# Patient Record
Sex: FEMALE | Race: ASIAN | NOT HISPANIC OR LATINO | URBAN - METROPOLITAN AREA
[De-identification: names, ages, dates, MRNs, and addresses within clinical notes are randomized per-mention and may not be internally consistent; named-entity substitution may affect disease eponyms.]

---

## 2023-05-25 LAB
ABO GROUP (TYPE) IN BLOOD: NORMAL
ALANINE AMINOTRANSFERASE (SGPT) (U/L) IN SER/PLAS: 11 U/L (ref 7–45)
ALBUMIN (G/DL) IN SER/PLAS: 4.4 G/DL (ref 3.4–5)
ALKALINE PHOSPHATASE (U/L) IN SER/PLAS: 65 U/L (ref 33–110)
ANION GAP IN SER/PLAS: 14 MMOL/L (ref 10–20)
ANTIBODY SCREEN: NORMAL
ASPARTATE AMINOTRANSFERASE (SGOT) (U/L) IN SER/PLAS: 12 U/L (ref 9–39)
BILIRUBIN TOTAL (MG/DL) IN SER/PLAS: 0.6 MG/DL (ref 0–1.2)
CALCIUM (MG/DL) IN SER/PLAS: 10.2 MG/DL (ref 8.6–10.6)
CARBON DIOXIDE, TOTAL (MMOL/L) IN SER/PLAS: 26 MMOL/L (ref 21–32)
CHLORIDE (MMOL/L) IN SER/PLAS: 101 MMOL/L (ref 98–107)
CREATININE (MG/DL) IN SER/PLAS: 0.48 MG/DL (ref 0.5–1.05)
ERYTHROCYTE DISTRIBUTION WIDTH (RATIO) BY AUTOMATED COUNT: 13.7 % (ref 11.5–14.5)
ERYTHROCYTE MEAN CORPUSCULAR HEMOGLOBIN CONCENTRATION (G/DL) BY AUTOMATED: 32.1 G/DL (ref 32–36)
ERYTHROCYTE MEAN CORPUSCULAR VOLUME (FL) BY AUTOMATED COUNT: 94 FL (ref 80–100)
ERYTHROCYTES (10*6/UL) IN BLOOD BY AUTOMATED COUNT: 4.47 X10E12/L (ref 4–5.2)
ESTIMATED AVERAGE GLUCOSE FOR HBA1C: 100 MG/DL
GFR FEMALE: >90 ML/MIN/1.73M2
GLUCOSE (MG/DL) IN SER/PLAS: 95 MG/DL (ref 74–99)
HEMATOCRIT (%) IN BLOOD BY AUTOMATED COUNT: 42.1 % (ref 36–46)
HEMOGLOBIN (G/DL) IN BLOOD: 13.5 G/DL (ref 12–16)
HEMOGLOBIN A1C/HEMOGLOBIN TOTAL IN BLOOD: 5.1 %
HEPATITIS B VIRUS SURFACE AG PRESENCE IN SERUM: NONREACTIVE
HEPATITIS C VIRUS AB PRESENCE IN SERUM: NONREACTIVE
HIV 1/ 2 AG/AB SCREEN: NONREACTIVE
LEUKOCYTES (10*3/UL) IN BLOOD BY AUTOMATED COUNT: 8.2 X10E9/L (ref 4.4–11.3)
NRBC (PER 100 WBCS) BY AUTOMATED COUNT: 0 /100 WBC (ref 0–0)
PLATELETS (10*3/UL) IN BLOOD AUTOMATED COUNT: 357 X10E9/L (ref 150–450)
POTASSIUM (MMOL/L) IN SER/PLAS: 4 MMOL/L (ref 3.5–5.3)
PROTEIN TOTAL: 8 G/DL (ref 6.4–8.2)
REFLEX ADDED, ANEMIA PANEL: NORMAL
RH FACTOR: NORMAL
RUBELLA VIRUS IGG AB: NEGATIVE
SODIUM (MMOL/L) IN SER/PLAS: 137 MMOL/L (ref 136–145)
SYPHILIS TOTAL AB: NONREACTIVE
UREA NITROGEN (MG/DL) IN SER/PLAS: 11 MG/DL (ref 6–23)

## 2023-05-26 LAB
CHLAMYDIA TRACH., AMPLIFIED: NEGATIVE
CREATININE (MG/DL) IN URINE: 55.1 MG/DL (ref 20–320)
N. GONORRHEA, AMPLIFIED: NEGATIVE
PROTEIN (MG/DL) IN URINE: 6 MG/DL (ref 5–24)
PROTEIN/CREATININE (MG/MG) IN URINE: 0.11 MG/MG CREAT (ref 0–0.17)

## 2023-05-28 LAB — URINE CULTURE: ABNORMAL

## 2023-05-30 LAB
HEMOGLOBIN A2: 3 %
HEMOGLOBIN A: 96.4 %
HEMOGLOBIN F: 0.6 %
HEMOGLOBIN IDENTIFICATION INTERPRETATION: NORMAL
PATH REVIEW-HGB IDENTIFICATION: NORMAL

## 2023-06-26 LAB — GLUCOSE, 1 HR SCREEN, PREG: 199 MG/DL

## 2023-06-28 LAB — URINE CULTURE: ABNORMAL

## 2023-08-26 LAB
CREATININE (MG/DL) IN URINE: 147 MG/DL (ref 20–320)
PROTEIN (MG/DL) IN URINE: 12 MG/DL (ref 5–24)
PROTEIN/CREATININE (MG/MG) IN URINE: 0.08 MG/MG CREAT (ref 0–0.17)

## 2023-08-29 LAB
ALANINE AMINOTRANSFERASE (SGPT) (U/L) IN SER/PLAS: 8 U/L (ref 7–45)
ALBUMIN (G/DL) IN SER/PLAS: 3.9 G/DL (ref 3.4–5)
ALKALINE PHOSPHATASE (U/L) IN SER/PLAS: 53 U/L (ref 33–110)
ANION GAP IN SER/PLAS: 13 MMOL/L (ref 10–20)
ASPARTATE AMINOTRANSFERASE (SGOT) (U/L) IN SER/PLAS: 9 U/L (ref 9–39)
BILIRUBIN TOTAL (MG/DL) IN SER/PLAS: 0.4 MG/DL (ref 0–1.2)
CALCIUM (MG/DL) IN SER/PLAS: 9.6 MG/DL (ref 8.6–10.6)
CARBON DIOXIDE, TOTAL (MMOL/L) IN SER/PLAS: 26 MMOL/L (ref 21–32)
CHLORIDE (MMOL/L) IN SER/PLAS: 103 MMOL/L (ref 98–107)
CREATININE (MG/DL) IN SER/PLAS: 0.61 MG/DL (ref 0.5–1.05)
ERYTHROCYTE DISTRIBUTION WIDTH (RATIO) BY AUTOMATED COUNT: 13.4 % (ref 11.5–14.5)
ERYTHROCYTE MEAN CORPUSCULAR HEMOGLOBIN CONCENTRATION (G/DL) BY AUTOMATED: 33.4 G/DL (ref 32–36)
ERYTHROCYTE MEAN CORPUSCULAR VOLUME (FL) BY AUTOMATED COUNT: 93 FL (ref 80–100)
ERYTHROCYTES (10*6/UL) IN BLOOD BY AUTOMATED COUNT: 3.95 X10E12/L (ref 4–5.2)
GFR FEMALE: >90 ML/MIN/1.73M2
GLUCOSE (MG/DL) IN SER/PLAS: 88 MG/DL (ref 74–99)
HEMATOCRIT (%) IN BLOOD BY AUTOMATED COUNT: 36.8 % (ref 36–46)
HEMOGLOBIN (G/DL) IN BLOOD: 12.3 G/DL (ref 12–16)
LACTATE DEHYDROGENASE (U/L) IN SER/PLAS BY LAC->PYR RXN: 138 U/L (ref 84–246)
LEUKOCYTES (10*3/UL) IN BLOOD BY AUTOMATED COUNT: 8.8 X10E9/L (ref 4.4–11.3)
NRBC (PER 100 WBCS) BY AUTOMATED COUNT: 0 /100 WBC (ref 0–0)
PLATELETS (10*3/UL) IN BLOOD AUTOMATED COUNT: 333 X10E9/L (ref 150–450)
POTASSIUM (MMOL/L) IN SER/PLAS: 4 MMOL/L (ref 3.5–5.3)
PROTEIN TOTAL: 6.9 G/DL (ref 6.4–8.2)
SODIUM (MMOL/L) IN SER/PLAS: 138 MMOL/L (ref 136–145)
UREA NITROGEN (MG/DL) IN SER/PLAS: 8 MG/DL (ref 6–23)

## 2023-09-25 PROBLEM — R05.3 CHRONIC COUGH: Status: ACTIVE | Noted: 2023-09-25

## 2023-09-25 PROBLEM — O24.414 INSULIN CONTROLLED GESTATIONAL DIABETES MELLITUS (GDM) DURING PREGNANCY, ANTEPARTUM (HHS-HCC): Status: ACTIVE | Noted: 2023-09-25

## 2023-09-25 PROBLEM — L30.9 ECZEMA: Status: ACTIVE | Noted: 2023-09-25

## 2023-09-25 PROBLEM — L21.8 OTHER SEBORRHEIC DERMATITIS: Status: ACTIVE | Noted: 2019-11-26

## 2023-09-25 PROBLEM — O10.913 MATERNAL CHRONIC HYPERTENSION IN THIRD TRIMESTER (HHS-HCC): Status: ACTIVE | Noted: 2023-09-25

## 2023-09-25 PROBLEM — O09.529: Status: ACTIVE | Noted: 2023-09-25

## 2023-09-25 PROBLEM — N39.0 URINARY TRACT INFECTION: Status: ACTIVE | Noted: 2023-09-25

## 2023-09-25 PROBLEM — O09.299: Status: ACTIVE | Noted: 2023-09-25

## 2023-09-25 PROBLEM — G47.00 INSOMNIA: Status: ACTIVE | Noted: 2023-09-25

## 2023-09-25 PROBLEM — Z98.891 HISTORY OF CESAREAN SECTION, CLASSICAL: Status: ACTIVE | Noted: 2023-09-25

## 2023-09-25 PROBLEM — N39.0 ACUTE UTI: Status: ACTIVE | Noted: 2023-09-25

## 2023-09-25 PROBLEM — O10.919 CHRONIC HYPERTENSION IN PREGNANCY (HHS-HCC): Status: ACTIVE | Noted: 2023-09-25

## 2023-09-25 PROBLEM — O14.10 SEVERE PREECLAMPSIA (HHS-HCC): Status: ACTIVE | Noted: 2023-09-25

## 2023-09-25 PROBLEM — R03.0 ELEVATED BLOOD PRESSURE READING: Status: ACTIVE | Noted: 2023-09-25

## 2023-09-25 PROBLEM — I10 HYPERTENSION: Status: ACTIVE | Noted: 2023-09-25

## 2023-09-25 PROBLEM — O23.40 UTI IN PREGNANCY (HHS-HCC): Status: ACTIVE | Noted: 2023-09-25

## 2023-09-25 PROBLEM — O24.419 GESTATIONAL DIABETES (HHS-HCC): Status: ACTIVE | Noted: 2023-09-25

## 2023-09-25 RX ORDER — IBUPROFEN 600 MG/1
TABLET ORAL EVERY 6 HOURS PRN
Status: ON HOLD | COMMUNITY
Start: 2021-07-12 | End: 2023-10-20 | Stop reason: ALTCHOICE

## 2023-09-25 RX ORDER — BETAMETHASONE DIPROPIONATE 0.5 MG/G
LOTION TOPICAL
COMMUNITY
Start: 2019-11-26

## 2023-09-25 RX ORDER — ACETAMINOPHEN 500 MG
TABLET ORAL EVERY 6 HOURS PRN
Status: ON HOLD | COMMUNITY
Start: 2021-07-12 | End: 2023-11-07 | Stop reason: SDUPTHER

## 2023-09-25 RX ORDER — NIFEDIPINE 30 MG/1
1 TABLET, FILM COATED, EXTENDED RELEASE ORAL DAILY
COMMUNITY
End: 2023-10-20 | Stop reason: DRUGHIGH

## 2023-09-25 RX ORDER — CICLOPIROX 1 G/100ML
SHAMPOO TOPICAL
Status: ON HOLD | COMMUNITY
Start: 2019-11-26 | End: 2023-10-20 | Stop reason: ALTCHOICE

## 2023-09-25 RX ORDER — DOCUSATE SODIUM 100 MG/1
CAPSULE, LIQUID FILLED ORAL 2 TIMES DAILY PRN
Status: ON HOLD | COMMUNITY
Start: 2021-07-12 | End: 2023-10-20 | Stop reason: ALTCHOICE

## 2023-10-02 ENCOUNTER — TELEPHONE (OUTPATIENT)
Dept: MATERNAL FETAL MEDICINE | Facility: CLINIC | Age: 38
End: 2023-10-02
Payer: OTHER GOVERNMENT

## 2023-10-02 NOTE — TELEPHONE ENCOUNTER
Communicated with the patient on 10/2/2023  She has Type 2 Diabetes  @ 26w4d    At the time of the call her diabetes was treated with:  Basaglar 18/28 am/pm  Lispro 10/10/24 with meals     Patient uses Dexcom G7 CGM for glucose monitoring. A digital download was completed and reviewed with the team.    Average glucose: 114 mg/dL  Standard deviation: 25 mg/dL    Time in Range  Very High: 0%  High: 16%  In Range: 83%  Low: <1%  Very Low: <1%    Target Range   mg/dL    Impression - Had some discrepancy with BGM and CGM readings being >20 points off. Did re calibrate, and just placed new sensor this morning. Having lunch and dinner elevations. When she eats lunch at work, she has better glucose readings, only taking 10 units. At home she has been taking 14 units with lunch, and is still elevated.     The patient's regimen was changed, as discussed with Dr. Ham, to:  Basaglar 18/28 am/pm  Lispro 10/10 or 18*/28* with meals --> will take larger dose when at home for lunch    Patient understands to submit sugar log for review weekly through the Blood Sugar Line @ [phone] or via email to [email or web address] to help optimize glucose control.     I Approximately 5-7 minutes was spent discussing the above information.

## 2023-10-05 DIAGNOSIS — O10.919 CHRONIC HYPERTENSION IN PREGNANCY (HHS-HCC): ICD-10-CM

## 2023-10-05 PROBLEM — D68.61 ANTIPHOSPHOLIPID ANTIBODY SYNDROME COMPLICATING PREGNANCY (MULTI): Status: ACTIVE | Noted: 2023-10-05

## 2023-10-05 PROBLEM — N39.0 URINARY TRACT INFECTION: Status: RESOLVED | Noted: 2023-09-25 | Resolved: 2023-10-05

## 2023-10-05 PROBLEM — Z87.59 HISTORY OF IUFD: Status: ACTIVE | Noted: 2023-10-05

## 2023-10-05 PROBLEM — O23.42: Status: ACTIVE | Noted: 2023-10-05

## 2023-10-05 PROBLEM — O99.119 ANTIPHOSPHOLIPID ANTIBODY SYNDROME COMPLICATING PREGNANCY (MULTI): Status: ACTIVE | Noted: 2023-10-05

## 2023-10-05 PROBLEM — Z87.898 HISTORY OF POOR FETAL GROWTH: Status: ACTIVE | Noted: 2023-10-05

## 2023-10-05 PROBLEM — R05.3 CHRONIC COUGH: Status: RESOLVED | Noted: 2023-09-25 | Resolved: 2023-10-05

## 2023-10-05 PROBLEM — N39.0 ACUTE UTI: Status: RESOLVED | Noted: 2023-09-25 | Resolved: 2023-10-05

## 2023-10-05 PROBLEM — O10.913 MATERNAL CHRONIC HYPERTENSION IN THIRD TRIMESTER (HHS-HCC): Status: RESOLVED | Noted: 2023-09-25 | Resolved: 2023-10-05

## 2023-10-05 PROBLEM — O09.522 MULTIGRAVIDA OF ADVANCED MATERNAL AGE IN SECOND TRIMESTER (HHS-HCC): Status: ACTIVE | Noted: 2023-10-05

## 2023-10-05 PROBLEM — O23.40 UTI IN PREGNANCY (HHS-HCC): Status: RESOLVED | Noted: 2023-09-25 | Resolved: 2023-10-05

## 2023-10-05 PROBLEM — Z87.59 HISTORY OF IUFD: Status: ACTIVE | Noted: 2023-09-25

## 2023-10-05 NOTE — ASSESSMENT & PLAN NOTE
- H/o IUFD in first pregnancy while undergoing surveillance at the hospital.   -plan for serial growth US starting at 24 wga.   -Recommend early  testing.

## 2023-10-05 NOTE — ASSESSMENT & PLAN NOTE
- Continue lovenox ppx 40 mg SC daily through 6 weeks postpartum, nw rx sent today   - Continue  mg now.   - Plan for  testing at 32 wks, given prior IUFD likely plan for sooner (see IUFD dx)

## 2023-10-05 NOTE — ASSESSMENT & PLAN NOTE
- A1c 5.1%   - Baseline [facility] labs, P:C WNL   - Current regimen, Basaglar  and  Lispro 10/10/24, adjusts additional 2-4 units for higher carb meal. Basaglar bedtime dose was discussed with prior visit to increase to 32. Confirmed pt current insulin dosing and no changes made today.   - PT has had some reactions to the prior insulins. Reports Basaglar has been working well. No reactions.   -Serial growth, twice weekly  testing at 32 wks

## 2023-10-05 NOTE — ASSESSMENT & PLAN NOTE
-PNL WNL  -Serial growth planned   -Discussed tdap and flu vaccines - wants to get at next visit   -Covid booster discussed- plans to get  -Third trimester labs ordered   -Continue weekly communication with blood sugar line   -PNV x 2 wks

## 2023-10-05 NOTE — ASSESSMENT & PLAN NOTE
-baseline HELLP labs WNL with P:C 0.11,  repeat set WNL and P:C 0.08 ()   - Will obtain maternal echo given longstanding h/o cHTN   - Continue bASA 162mg QD   - BPs 130/80-90 at home, continue home BP monitoring   - Continue Nifedipine 90mg daily   -Denies s/s PEC  -Twice weekly  testing, serial growth

## 2023-10-05 NOTE — ASSESSMENT & PLAN NOTE
-H/O delivery at early viability (24 wks)   -Plan for rCD 36-37 wks or earlier pending pregnancy progression

## 2023-10-06 ENCOUNTER — ANCILLARY PROCEDURE (OUTPATIENT)
Dept: RADIOLOGY | Facility: CLINIC | Age: 38
End: 2023-10-06
Payer: OTHER GOVERNMENT

## 2023-10-06 ENCOUNTER — ROUTINE PRENATAL (OUTPATIENT)
Dept: MATERNAL FETAL MEDICINE | Facility: CLINIC | Age: 38
End: 2023-10-06
Payer: OTHER GOVERNMENT

## 2023-10-06 ENCOUNTER — APPOINTMENT (OUTPATIENT)
Dept: RADIOLOGY | Facility: CLINIC | Age: 38
End: 2023-10-06
Payer: OTHER GOVERNMENT

## 2023-10-06 VITALS — SYSTOLIC BLOOD PRESSURE: 126 MMHG | BODY MASS INDEX: 34.57 KG/M2 | WEIGHT: 189 LBS | DIASTOLIC BLOOD PRESSURE: 83 MMHG

## 2023-10-06 DIAGNOSIS — Z87.898 HISTORY OF POOR FETAL GROWTH: ICD-10-CM

## 2023-10-06 DIAGNOSIS — O10.919 CHRONIC HYPERTENSION IN PREGNANCY (HHS-HCC): ICD-10-CM

## 2023-10-06 DIAGNOSIS — O99.119 ANTIPHOSPHOLIPID ANTIBODY SYNDROME COMPLICATING PREGNANCY (MULTI): ICD-10-CM

## 2023-10-06 DIAGNOSIS — O24.414 INSULIN CONTROLLED GESTATIONAL DIABETES MELLITUS (GDM) DURING PREGNANCY, ANTEPARTUM (HHS-HCC): ICD-10-CM

## 2023-10-06 DIAGNOSIS — Z34.90 PREGNANT (HHS-HCC): ICD-10-CM

## 2023-10-06 DIAGNOSIS — O23.42 RECURRENT URINARY TRACT INFECTION AFFECTING PREGNANCY IN SECOND TRIMESTER (HHS-HCC): ICD-10-CM

## 2023-10-06 DIAGNOSIS — O09.522 MULTIGRAVIDA OF ADVANCED MATERNAL AGE IN SECOND TRIMESTER (HHS-HCC): ICD-10-CM

## 2023-10-06 DIAGNOSIS — Z98.891 HISTORY OF CESAREAN SECTION, CLASSICAL: ICD-10-CM

## 2023-10-06 DIAGNOSIS — O09.522 HIGH RISK PREGNANCY, MULTIGRAVIDA OF ADVANCED MATERNAL AGE IN SECOND TRIMESTER (HHS-HCC): ICD-10-CM

## 2023-10-06 DIAGNOSIS — D68.61 ANTIPHOSPHOLIPID ANTIBODY SYNDROME COMPLICATING PREGNANCY (MULTI): ICD-10-CM

## 2023-10-06 DIAGNOSIS — Z87.59 HISTORY OF IUFD: ICD-10-CM

## 2023-10-06 LAB
POC APPEARANCE, URINE: CLEAR
POC BILIRUBIN, URINE: NEGATIVE
POC BLOOD, URINE: NEGATIVE
POC COLOR, URINE: YELLOW
POC GLUCOSE, URINE: NEGATIVE MG/DL
POC KETONES, URINE: ABNORMAL MG/DL
POC LEUKOCYTES, URINE: NEGATIVE
POC NITRITE,URINE: NEGATIVE
POC PH, URINE: 6 PH
POC PROTEIN, URINE: ABNORMAL MG/DL
POC SPECIFIC GRAVITY, URINE: 1.02
POC UROBILINOGEN, URINE: 0.2 EU/DL

## 2023-10-06 PROCEDURE — 81003 URINALYSIS AUTO W/O SCOPE: CPT | Mod: QW | Performed by: NURSE PRACTITIONER

## 2023-10-06 PROCEDURE — 76816 OB US FOLLOW-UP PER FETUS: CPT | Performed by: OBSTETRICS & GYNECOLOGY

## 2023-10-06 PROCEDURE — 76819 FETAL BIOPHYS PROFIL W/O NST: CPT | Performed by: OBSTETRICS & GYNECOLOGY

## 2023-10-06 PROCEDURE — G0463 HOSPITAL OUTPT CLINIC VISIT: HCPCS | Mod: 25 | Performed by: NURSE PRACTITIONER

## 2023-10-06 PROCEDURE — 76816 OB US FOLLOW-UP PER FETUS: CPT

## 2023-10-06 PROCEDURE — 99215 OFFICE O/P EST HI 40 MIN: CPT | Performed by: NURSE PRACTITIONER

## 2023-10-06 PROCEDURE — 99215 OFFICE O/P EST HI 40 MIN: CPT | Mod: 25 | Performed by: NURSE PRACTITIONER

## 2023-10-06 RX ORDER — ENOXAPARIN SODIUM 100 MG/ML
40 INJECTION SUBCUTANEOUS DAILY
Qty: 30 EACH | Refills: 5 | Status: ON HOLD | OUTPATIENT
Start: 2023-10-06 | End: 2023-11-07

## 2023-10-06 ASSESSMENT — EDINBURGH POSTNATAL DEPRESSION SCALE (EPDS)
I HAVE BEEN ANXIOUS OR WORRIED FOR NO GOOD REASON: YES, SOMETIMES
I HAVE BEEN SO UNHAPPY THAT I HAVE BEEN CRYING: NO, NEVER
THE THOUGHT OF HARMING MYSELF HAS OCCURRED TO ME: NEVER
I HAVE BEEN ABLE TO LAUGH AND SEE THE FUNNY SIDE OF THINGS: AS MUCH AS I ALWAYS COULD
I HAVE FELT SAD OR MISERABLE: NO, NOT AT ALL
I HAVE BEEN SO UNHAPPY THAT I HAVE HAD DIFFICULTY SLEEPING: NOT AT ALL
THINGS HAVE BEEN GETTING ON TOP OF ME: NO, MOST OF THE TIME I HAVE COPED QUITE WELL
TOTAL SCORE: 6
I HAVE FELT SCARED OR PANICKY FOR NO GOOD REASON: NO, NOT MUCH
I HAVE LOOKED FORWARD WITH ENJOYMENT TO THINGS: AS MUCH AS I EVER DID
I HAVE BLAMED MYSELF UNNECESSARILY WHEN THINGS WENT WRONG: YES, SOME OF THE TIME

## 2023-10-06 NOTE — PROGRESS NOTES
Subjective   Patient ID [id number]   Gabrielle David is a 38 y.o.  at 27w1d with a working estimated date of delivery of 2024, by Last Menstrual Period who presents for a routine prenatal visit. She denies vaginal bleeding, leakage of fluid, or contractions. +FM.     Overall doing well.     BP at home has been 130s/80s. Denies s/s PEC. Compliant with nifedipine 90mg every day.     Her pregnancy is complicated by:  cHTN  APLS  Poor pregnancy hx  GDM    Objective   Physical Exam  Weight: 85.7 kg (189 lb)  BP: 126/83    Gen-NAD  Cardiac- good peripheral perfusion   Respiratory- non-labored breathing   Abdomen-soft, non-tender, gravid  Extremities- symmetrical          Prenatal Labs    Lab Results   Component Value Date    HGB 12.3 2023    HCT 36.8 2023    HEPBSAG NONREACTIVE 2023         Imaging  The most recent ultrasound was performed today, 10/6- normal interval growth, EFW 17%, AC 20%, BPP     Assessment/Plan   Problem List Items Addressed This Visit             ICD-10-CM       Pregnancy    History of  section, classical Z98.891     -H/O delivery at early viability (24 wks)   -Plan for rCD 36-37 wks or earlier pending pregnancy progression            High-risk pregnancy, elderly multigravida O09.529     -PNL WNL  -Serial growth planned   -Discussed tdap and flu vaccines - wants to get at next visit   -Covid booster discussed- plans to get  -Third trimester labs ordered   -Continue weekly communication with blood sugar line   -PNV x 2 wks          Relevant Orders    POCT UA Automated manually resulted    CBC Anemia Panel With Reflex, Pregnancy    Syphilis Screen with Reflex    Vitamin D 25-Hydroxy,Total (for eval of Vitamin D levels)    Insulin controlled gestational diabetes mellitus (GDM) during pregnancy, antepartum O24.414     - A1c 5.1%   - Baseline HELLP labs, P:C WNL   - Current regimen, Basaglar  and  Lispro 10/10/24, adjusts additional 2-4 units for higher carb meal.  Basaglar bedtime dose was discussed with prior visit to increase to 32. Confirmed pt current insulin dosing and no changes made today.   - PT has had some reactions to the prior insulins. Reports Basaglar has been working well. No reactions.   -Serial growth, twice weekly  testing at 32 wks          Chronic hypertension in pregnancy O10.919     -baseline HELLP labs WNL with P:C 0.11,  repeat set WNL and P:C 0.08 ()   - Will obtain maternal echo given longstanding h/o cHTN   - Continue bASA 162mg QD   - BPs 130/80-90 at home, continue home BP monitoring   - Continue Nifedipine 90mg daily   -Denies s/s PEC  -Twice weekly  testing, serial growth          History of IUFD Z87.59     - H/o IUFD in first pregnancy while undergoing surveillance at the hospital.   -plan for serial growth US starting at 24 wga.   -Recommend early  testing.          Antiphospholipid antibody syndrome complicating pregnancy (CMS/[facility]) O99.119, D68.61     - Continue lovenox ppx 40 mg SC daily through 6 weeks postpartum, nw rx sent today   - Continue  mg now.   - Plan for  testing at 32 wks, given prior IUFD likely plan for sooner (see IUFD dx)         Relevant Medications    enoxaparin (Lovenox) 40 mg/0.4 mL syringe    Recurrent urinary tract infection affecting pregnancy in second trimester O23.42     -Continue ppx macrobid          History of poor fetal growth Z87.898     -Serial growth planned  -Growth today, 10/6- EFW 17%, AC 20%- normal interval growth          Multigravida of advanced maternal age in second trimester O09.522     -Declined genetic counseling and cfDNA testing   -NT WNL  -Anatomy completed  and WNL              Continue prenatal vitamin.  Labs ordered  Tdap and flu next visit   Follow up in 2 weeks    PRINCE Hull-CNP

## 2023-10-10 ENCOUNTER — LAB (OUTPATIENT)
Dept: LAB | Facility: LAB | Age: 38
End: 2023-10-10
Payer: OTHER GOVERNMENT

## 2023-10-10 DIAGNOSIS — O09.522 HIGH RISK PREGNANCY, MULTIGRAVIDA OF ADVANCED MATERNAL AGE IN SECOND TRIMESTER (HHS-HCC): ICD-10-CM

## 2023-10-10 DIAGNOSIS — E55.9 VITAMIN D DEFICIENCY: Primary | ICD-10-CM

## 2023-10-10 PROCEDURE — 85027 COMPLETE CBC AUTOMATED: CPT

## 2023-10-10 PROCEDURE — 36415 COLL VENOUS BLD VENIPUNCTURE: CPT

## 2023-10-10 PROCEDURE — 86780 TREPONEMA PALLIDUM: CPT

## 2023-10-10 PROCEDURE — 82306 VITAMIN D 25 HYDROXY: CPT

## 2023-10-11 ENCOUNTER — TELEPHONE (OUTPATIENT)
Dept: OBSTETRICS AND GYNECOLOGY | Facility: CLINIC | Age: 38
End: 2023-10-11
Payer: OTHER GOVERNMENT

## 2023-10-11 LAB
25(OH)D3 SERPL-MCNC: 17 NG/ML (ref 30–100)
ERYTHROCYTE [DISTWIDTH] IN BLOOD BY AUTOMATED COUNT: 13.8 % (ref 11.5–14.5)
HCT VFR BLD AUTO: 40.5 % (ref 36–46)
HGB BLD-MCNC: 13.2 G/DL (ref 12–16)
MCH RBC QN AUTO: 30.9 PG (ref 26–34)
MCHC RBC AUTO-ENTMCNC: 32.6 G/DL (ref 32–36)
MCV RBC AUTO: 95 FL (ref 80–100)
NRBC BLD-RTO: 0 /100 WBCS (ref 0–0)
PLATELET # BLD AUTO: 325 X10*3/UL (ref 150–450)
PMV BLD AUTO: 10.5 FL (ref 7.5–11.5)
RBC # BLD AUTO: 4.27 X10*6/UL (ref 4–5.2)
T PALLIDUM AB SER QL: NONREACTIVE
WBC # BLD AUTO: 8.4 X10*3/UL (ref 4.4–11.3)

## 2023-10-11 RX ORDER — VIT C/E/ZN/COPPR/LUTEIN/ZEAXAN 250MG-90MG
25 CAPSULE ORAL DAILY
Qty: 30 CAPSULE | Refills: 4 | Status: SHIPPED | OUTPATIENT
Start: 2023-10-11 | End: 2023-11-07 | Stop reason: HOSPADM

## 2023-10-11 NOTE — TELEPHONE ENCOUNTER
Spoke to the patient and let her know her vitamin D level was low and to start taking a vitamin D supplement. The patient said she will also try adding more food with vitamin D into her diet.

## 2023-10-13 ENCOUNTER — APPOINTMENT (OUTPATIENT)
Dept: PEDIATRIC CARDIOLOGY | Facility: CLINIC | Age: 38
End: 2023-10-13
Payer: OTHER GOVERNMENT

## 2023-10-13 ENCOUNTER — TELEPHONE (OUTPATIENT)
Dept: MATERNAL FETAL MEDICINE | Facility: HOSPITAL | Age: 38
End: 2023-10-13
Payer: OTHER GOVERNMENT

## 2023-10-13 NOTE — TELEPHONE ENCOUNTER
Blood Sugar Support Line Communication   Communicated with the patient on 10/13/2023   She has Gestational Diabetes @ 28w1d    The patient checks her sugars fasting and 1 hour after meals. Her current regimen is as follows:  Actually taking  Basaglar Insulin (glargine) 18/28  Lispro Insulin 10/18/24    The patient's reported blood sugars appear well controlled, with 80% within the goal range. No changes to her current treatment plan are indicated at this time.    Patient understands to submit sugar log for review weekly through the Blood Sugar Line @ [phone] or via email to [email or web address] to help optimize glucose control.    I spent approximately 4-6 minutes on the phone with the patient

## 2023-10-17 ENCOUNTER — ANCILLARY PROCEDURE (OUTPATIENT)
Dept: CARDIOLOGY | Facility: CLINIC | Age: 38
End: 2023-10-17
Payer: OTHER GOVERNMENT

## 2023-10-17 VITALS — DIASTOLIC BLOOD PRESSURE: 83 MMHG | SYSTOLIC BLOOD PRESSURE: 126 MMHG

## 2023-10-17 DIAGNOSIS — O10.919 CHRONIC HYPERTENSION IN PREGNANCY (HHS-HCC): ICD-10-CM

## 2023-10-17 LAB — EJECTION FRACTION APICAL 4 CHAMBER: 69.7

## 2023-10-17 PROCEDURE — 93306 TTE W/DOPPLER COMPLETE: CPT | Performed by: STUDENT IN AN ORGANIZED HEALTH CARE EDUCATION/TRAINING PROGRAM

## 2023-10-17 PROCEDURE — 93306 TTE W/DOPPLER COMPLETE: CPT

## 2023-10-20 ENCOUNTER — LAB (OUTPATIENT)
Dept: LAB | Facility: LAB | Age: 38
End: 2023-10-20
Payer: OTHER GOVERNMENT

## 2023-10-20 ENCOUNTER — APPOINTMENT (OUTPATIENT)
Dept: OBSTETRICS AND GYNECOLOGY | Facility: CLINIC | Age: 38
End: 2023-10-20
Payer: OTHER GOVERNMENT

## 2023-10-20 ENCOUNTER — HOSPITAL ENCOUNTER (OUTPATIENT)
Facility: HOSPITAL | Age: 38
Discharge: HOME | End: 2023-10-20
Attending: OBSTETRICS & GYNECOLOGY | Admitting: OBSTETRICS & GYNECOLOGY
Payer: OTHER GOVERNMENT

## 2023-10-20 ENCOUNTER — HOSPITAL ENCOUNTER (INPATIENT)
Facility: HOSPITAL | Age: 38
LOS: 18 days | Discharge: HOME | End: 2023-11-07
Attending: OBSTETRICS & GYNECOLOGY | Admitting: OBSTETRICS & GYNECOLOGY
Payer: OTHER GOVERNMENT

## 2023-10-20 ENCOUNTER — ROUTINE PRENATAL (OUTPATIENT)
Dept: MATERNAL FETAL MEDICINE | Facility: CLINIC | Age: 38
End: 2023-10-20
Payer: OTHER GOVERNMENT

## 2023-10-20 VITALS — DIASTOLIC BLOOD PRESSURE: 95 MMHG | WEIGHT: 197 LBS | BODY MASS INDEX: 36.03 KG/M2 | SYSTOLIC BLOOD PRESSURE: 145 MMHG

## 2023-10-20 VITALS
HEART RATE: 82 BPM | RESPIRATION RATE: 18 BRPM | SYSTOLIC BLOOD PRESSURE: 183 MMHG | DIASTOLIC BLOOD PRESSURE: 97 MMHG | OXYGEN SATURATION: 98 % | TEMPERATURE: 98.1 F

## 2023-10-20 DIAGNOSIS — Z3A.29 29 WEEKS GESTATION OF PREGNANCY (HHS-HCC): ICD-10-CM

## 2023-10-20 DIAGNOSIS — O10.919 CHRONIC HYPERTENSION AFFECTING PREGNANCY (HHS-HCC): ICD-10-CM

## 2023-10-20 DIAGNOSIS — O99.119 ANTIPHOSPHOLIPID ANTIBODY SYNDROME COMPLICATING PREGNANCY (MULTI): ICD-10-CM

## 2023-10-20 DIAGNOSIS — I10 HYPERTENSION: Primary | ICD-10-CM

## 2023-10-20 DIAGNOSIS — D68.61 ANTIPHOSPHOLIPID ANTIBODY SYNDROME COMPLICATING PREGNANCY (MULTI): ICD-10-CM

## 2023-10-20 DIAGNOSIS — Z98.891 STATUS POST CESAREAN SECTION: ICD-10-CM

## 2023-10-20 DIAGNOSIS — O10.919 CHRONIC HYPERTENSION AFFECTING PREGNANCY (HHS-HCC): Primary | ICD-10-CM

## 2023-10-20 PROBLEM — O14.13 SEVERE PREECLAMPSIA, THIRD TRIMESTER (HHS-HCC): Status: ACTIVE | Noted: 2023-10-20

## 2023-10-20 PROBLEM — O11.9 PREECLAMPSIA COMPLICATING HYPERTENSION (HHS-HCC): Status: ACTIVE | Noted: 2023-10-20

## 2023-10-20 LAB
ABO GROUP (TYPE) IN BLOOD: NORMAL
ALBUMIN SERPL BCP-MCNC: 3.4 G/DL (ref 3.4–5)
ALBUMIN SERPL BCP-MCNC: 3.7 G/DL (ref 3.4–5)
ALP SERPL-CCNC: 75 U/L (ref 33–110)
ALP SERPL-CCNC: 77 U/L (ref 33–110)
ALT SERPL W P-5'-P-CCNC: 17 U/L (ref 7–45)
ALT SERPL W P-5'-P-CCNC: 18 U/L (ref 7–45)
ANION GAP SERPL CALC-SCNC: 12 MMOL/L (ref 10–20)
ANION GAP SERPL CALC-SCNC: 13 MMOL/L (ref 10–20)
ANTIBODY SCREEN: NORMAL
AST SERPL W P-5'-P-CCNC: 14 U/L (ref 9–39)
AST SERPL W P-5'-P-CCNC: 15 U/L (ref 9–39)
BILIRUB SERPL-MCNC: 0.3 MG/DL (ref 0–1.2)
BILIRUB SERPL-MCNC: 0.3 MG/DL (ref 0–1.2)
BUN SERPL-MCNC: 11 MG/DL (ref 6–23)
BUN SERPL-MCNC: 12 MG/DL (ref 6–23)
CALCIUM SERPL-MCNC: 8.8 MG/DL (ref 8.6–10.6)
CALCIUM SERPL-MCNC: 9.1 MG/DL (ref 8.6–10.3)
CHLORIDE SERPL-SCNC: 101 MMOL/L (ref 98–107)
CHLORIDE SERPL-SCNC: 104 MMOL/L (ref 98–107)
CO2 SERPL-SCNC: 24 MMOL/L (ref 21–32)
CO2 SERPL-SCNC: 26 MMOL/L (ref 21–32)
CREAT SERPL-MCNC: 0.57 MG/DL (ref 0.5–1.05)
CREAT SERPL-MCNC: 0.61 MG/DL (ref 0.5–1.05)
CREAT UR-MCNC: 167.3 MG/DL (ref 20–320)
ERYTHROCYTE [DISTWIDTH] IN BLOOD BY AUTOMATED COUNT: 14 % (ref 11.5–14.5)
ERYTHROCYTE [DISTWIDTH] IN BLOOD BY AUTOMATED COUNT: 14 % (ref 11.5–14.5)
GFR SERPL CREATININE-BSD FRML MDRD: >90 ML/MIN/1.73M*2
GFR SERPL CREATININE-BSD FRML MDRD: >90 ML/MIN/1.73M*2
GLUCOSE SERPL-MCNC: 170 MG/DL (ref 74–99)
GLUCOSE SERPL-MCNC: 44 MG/DL (ref 74–99)
HCT VFR BLD AUTO: 36.4 % (ref 36–46)
HCT VFR BLD AUTO: 41 % (ref 36–46)
HGB BLD-MCNC: 12.3 G/DL (ref 12–16)
HGB BLD-MCNC: 13.7 G/DL (ref 12–16)
MCH RBC QN AUTO: 31.4 PG (ref 26–34)
MCH RBC QN AUTO: 31.6 PG (ref 26–34)
MCHC RBC AUTO-ENTMCNC: 33.4 G/DL (ref 32–36)
MCHC RBC AUTO-ENTMCNC: 33.8 G/DL (ref 32–36)
MCV RBC AUTO: 93 FL (ref 80–100)
MCV RBC AUTO: 95 FL (ref 80–100)
NRBC BLD-RTO: 0 /100 WBCS (ref 0–0)
NRBC BLD-RTO: 0 /100 WBCS (ref 0–0)
PLATELET # BLD AUTO: 297 X10*3/UL (ref 150–450)
PLATELET # BLD AUTO: 339 X10*3/UL (ref 150–450)
PMV BLD AUTO: 10.2 FL (ref 7.5–11.5)
PMV BLD AUTO: 9.9 FL (ref 7.5–11.5)
POC APPEARANCE, URINE: CLEAR
POC BILIRUBIN, URINE: NEGATIVE
POC BLOOD, URINE: ABNORMAL
POC COLOR, URINE: YELLOW
POC GLUCOSE, URINE: ABNORMAL MG/DL
POC KETONES, URINE: NEGATIVE MG/DL
POC LEUKOCYTES, URINE: NEGATIVE
POC NITRITE,URINE: NEGATIVE
POC PH, URINE: 6 PH
POC PROTEIN, URINE: ABNORMAL MG/DL
POC SPECIFIC GRAVITY, URINE: 1.02
POC UROBILINOGEN, URINE: 0.2 EU/DL
POTASSIUM SERPL-SCNC: 4 MMOL/L (ref 3.5–5.3)
POTASSIUM SERPL-SCNC: 4.5 MMOL/L (ref 3.5–5.3)
PROT SERPL-MCNC: 6.2 G/DL (ref 6.4–8.2)
PROT SERPL-MCNC: 6.5 G/DL (ref 6.4–8.2)
PROT UR-ACNC: 30 MG/DL (ref 5–24)
PROT/CREAT UR: 0.18 MG/MG CREAT (ref 0–0.17)
RBC # BLD AUTO: 3.92 X10*6/UL (ref 4–5.2)
RBC # BLD AUTO: 4.33 X10*6/UL (ref 4–5.2)
RH FACTOR (ANTIGEN D): NORMAL
SODIUM SERPL-SCNC: 134 MMOL/L (ref 136–145)
SODIUM SERPL-SCNC: 137 MMOL/L (ref 136–145)
WBC # BLD AUTO: 8.5 X10*3/UL (ref 4.4–11.3)
WBC # BLD AUTO: 9.5 X10*3/UL (ref 4.4–11.3)

## 2023-10-20 PROCEDURE — 82570 ASSAY OF URINE CREATININE: CPT | Performed by: STUDENT IN AN ORGANIZED HEALTH CARE EDUCATION/TRAINING PROGRAM

## 2023-10-20 PROCEDURE — 2500000004 HC RX 250 GENERAL PHARMACY W/ HCPCS (ALT 636 FOR OP/ED)

## 2023-10-20 PROCEDURE — 87081 CULTURE SCREEN ONLY: CPT | Performed by: STUDENT IN AN ORGANIZED HEALTH CARE EDUCATION/TRAINING PROGRAM

## 2023-10-20 PROCEDURE — 81003 URINALYSIS AUTO W/O SCOPE: CPT | Mod: QW | Performed by: STUDENT IN AN ORGANIZED HEALTH CARE EDUCATION/TRAINING PROGRAM

## 2023-10-20 PROCEDURE — 2500000002 HC RX 250 W HCPCS SELF ADMINISTERED DRUGS (ALT 637 FOR MEDICARE OP, ALT 636 FOR OP/ED)

## 2023-10-20 PROCEDURE — 99214 OFFICE O/P EST MOD 30 MIN: CPT | Mod: 25 | Performed by: STUDENT IN AN ORGANIZED HEALTH CARE EDUCATION/TRAINING PROGRAM

## 2023-10-20 PROCEDURE — 1120000001 HC OB PRIVATE ROOM DAILY

## 2023-10-20 PROCEDURE — 85027 COMPLETE CBC AUTOMATED: CPT | Mod: CMCLAB | Performed by: STUDENT IN AN ORGANIZED HEALTH CARE EDUCATION/TRAINING PROGRAM

## 2023-10-20 PROCEDURE — 2500000004 HC RX 250 GENERAL PHARMACY W/ HCPCS (ALT 636 FOR OP/ED): Performed by: STUDENT IN AN ORGANIZED HEALTH CARE EDUCATION/TRAINING PROGRAM

## 2023-10-20 PROCEDURE — 96372 THER/PROPH/DIAG INJ SC/IM: CPT | Performed by: STUDENT IN AN ORGANIZED HEALTH CARE EDUCATION/TRAINING PROGRAM

## 2023-10-20 PROCEDURE — 99199 UNLISTED SPECIAL SVC PX/RPRT: CPT

## 2023-10-20 PROCEDURE — 86901 BLOOD TYPING SEROLOGIC RH(D): CPT | Performed by: STUDENT IN AN ORGANIZED HEALTH CARE EDUCATION/TRAINING PROGRAM

## 2023-10-20 PROCEDURE — 59025 FETAL NON-STRESS TEST: CPT | Performed by: STUDENT IN AN ORGANIZED HEALTH CARE EDUCATION/TRAINING PROGRAM

## 2023-10-20 PROCEDURE — 85027 COMPLETE CBC AUTOMATED: CPT

## 2023-10-20 PROCEDURE — 82947 ASSAY GLUCOSE BLOOD QUANT: CPT | Mod: CMCLAB

## 2023-10-20 PROCEDURE — 90715 TDAP VACCINE 7 YRS/> IM: CPT | Performed by: STUDENT IN AN ORGANIZED HEALTH CARE EDUCATION/TRAINING PROGRAM

## 2023-10-20 PROCEDURE — 99223 1ST HOSP IP/OBS HIGH 75: CPT | Performed by: STUDENT IN AN ORGANIZED HEALTH CARE EDUCATION/TRAINING PROGRAM

## 2023-10-20 PROCEDURE — 90471 IMMUNIZATION ADMIN: CPT | Performed by: STUDENT IN AN ORGANIZED HEALTH CARE EDUCATION/TRAINING PROGRAM

## 2023-10-20 PROCEDURE — 80053 COMPREHEN METABOLIC PANEL: CPT

## 2023-10-20 PROCEDURE — 36415 COLL VENOUS BLD VENIPUNCTURE: CPT | Performed by: STUDENT IN AN ORGANIZED HEALTH CARE EDUCATION/TRAINING PROGRAM

## 2023-10-20 PROCEDURE — 96372 THER/PROPH/DIAG INJ SC/IM: CPT

## 2023-10-20 PROCEDURE — 80053 COMPREHEN METABOLIC PANEL: CPT | Performed by: STUDENT IN AN ORGANIZED HEALTH CARE EDUCATION/TRAINING PROGRAM

## 2023-10-20 PROCEDURE — 99214 OFFICE O/P EST MOD 30 MIN: CPT | Performed by: STUDENT IN AN ORGANIZED HEALTH CARE EDUCATION/TRAINING PROGRAM

## 2023-10-20 RX ORDER — ONDANSETRON HYDROCHLORIDE 2 MG/ML
4 INJECTION, SOLUTION INTRAVENOUS EVERY 6 HOURS PRN
Status: DISCONTINUED | OUTPATIENT
Start: 2023-10-20 | End: 2023-11-03

## 2023-10-20 RX ORDER — INSULIN LISPRO 100 [IU]/ML
24 INJECTION, SOLUTION INTRAVENOUS; SUBCUTANEOUS
Status: DISCONTINUED | OUTPATIENT
Start: 2023-10-21 | End: 2023-10-20

## 2023-10-20 RX ORDER — INSULIN GLARGINE 100 [IU]/ML
18 INJECTION, SOLUTION SUBCUTANEOUS
Status: DISCONTINUED | OUTPATIENT
Start: 2023-10-21 | End: 2023-10-20

## 2023-10-20 RX ORDER — INSULIN GLARGINE 100 [IU]/ML
34 INJECTION, SOLUTION SUBCUTANEOUS NIGHTLY
Status: DISCONTINUED | OUTPATIENT
Start: 2023-10-21 | End: 2023-10-20

## 2023-10-20 RX ORDER — LABETALOL HYDROCHLORIDE 5 MG/ML
20 INJECTION, SOLUTION INTRAVENOUS ONCE AS NEEDED
Status: DISCONTINUED | OUTPATIENT
Start: 2023-10-20 | End: 2023-10-20

## 2023-10-20 RX ORDER — METOCLOPRAMIDE HYDROCHLORIDE 5 MG/ML
10 INJECTION INTRAMUSCULAR; INTRAVENOUS EVERY 6 HOURS PRN
Status: DISCONTINUED | OUTPATIENT
Start: 2023-10-20 | End: 2023-10-20

## 2023-10-20 RX ORDER — NIFEDIPINE 60 MG/1
60 TABLET, FILM COATED, EXTENDED RELEASE ORAL EVERY 12 HOURS
Status: DISCONTINUED | OUTPATIENT
Start: 2023-10-21 | End: 2023-10-21

## 2023-10-20 RX ORDER — INSULIN LISPRO 100 [IU]/ML
12 INJECTION, SOLUTION INTRAVENOUS; SUBCUTANEOUS
Status: DISCONTINUED | OUTPATIENT
Start: 2023-10-21 | End: 2023-10-21

## 2023-10-20 RX ORDER — SODIUM CHLORIDE, SODIUM LACTATE, POTASSIUM CHLORIDE, CALCIUM CHLORIDE 600; 310; 30; 20 MG/100ML; MG/100ML; MG/100ML; MG/100ML
125 INJECTION, SOLUTION INTRAVENOUS CONTINUOUS
Status: DISCONTINUED | OUTPATIENT
Start: 2023-10-20 | End: 2023-11-01

## 2023-10-20 RX ORDER — LABETALOL HYDROCHLORIDE 5 MG/ML
40 INJECTION, SOLUTION INTRAVENOUS ONCE
Status: DISCONTINUED | OUTPATIENT
Start: 2023-10-20 | End: 2023-10-30 | Stop reason: ALTCHOICE

## 2023-10-20 RX ORDER — SODIUM CHLORIDE, SODIUM LACTATE, POTASSIUM CHLORIDE, CALCIUM CHLORIDE 600; 310; 30; 20 MG/100ML; MG/100ML; MG/100ML; MG/100ML
125 INJECTION, SOLUTION INTRAVENOUS CONTINUOUS
Status: DISCONTINUED | OUTPATIENT
Start: 2023-10-20 | End: 2023-10-20

## 2023-10-20 RX ORDER — INSULIN LISPRO 100 [IU]/ML
28 INJECTION, SOLUTION INTRAVENOUS; SUBCUTANEOUS
Status: DISCONTINUED | OUTPATIENT
Start: 2023-10-20 | End: 2023-10-21

## 2023-10-20 RX ORDER — NIFEDIPINE 10 MG/1
10 CAPSULE ORAL ONCE AS NEEDED
Status: DISCONTINUED | OUTPATIENT
Start: 2023-10-20 | End: 2023-11-03

## 2023-10-20 RX ORDER — LABETALOL HYDROCHLORIDE 5 MG/ML
20 INJECTION, SOLUTION INTRAVENOUS ONCE AS NEEDED
Status: DISCONTINUED | OUTPATIENT
Start: 2023-10-20 | End: 2023-11-03

## 2023-10-20 RX ORDER — HYDRALAZINE HYDROCHLORIDE 20 MG/ML
5 INJECTION INTRAMUSCULAR; INTRAVENOUS ONCE AS NEEDED
Status: COMPLETED | OUTPATIENT
Start: 2023-10-20 | End: 2023-11-01

## 2023-10-20 RX ORDER — HYDRALAZINE HYDROCHLORIDE 20 MG/ML
5 INJECTION INTRAMUSCULAR; INTRAVENOUS ONCE AS NEEDED
Status: DISCONTINUED | OUTPATIENT
Start: 2023-10-20 | End: 2023-10-20 | Stop reason: HOSPADM

## 2023-10-20 RX ORDER — LIDOCAINE HYDROCHLORIDE 10 MG/ML
0.5 INJECTION INFILTRATION; PERINEURAL ONCE AS NEEDED
Status: DISCONTINUED | OUTPATIENT
Start: 2023-10-20 | End: 2023-10-20 | Stop reason: HOSPADM

## 2023-10-20 RX ORDER — NIFEDIPINE 60 MG/1
60 TABLET, FILM COATED, EXTENDED RELEASE ORAL 2 TIMES DAILY
Qty: 60 TABLET | Refills: 11 | Status: ON HOLD | OUTPATIENT
Start: 2023-10-20 | End: 2023-11-07 | Stop reason: SDUPTHER

## 2023-10-20 RX ORDER — ONDANSETRON 4 MG/1
4 TABLET, FILM COATED ORAL EVERY 6 HOURS PRN
Status: DISCONTINUED | OUTPATIENT
Start: 2023-10-20 | End: 2023-11-03

## 2023-10-20 RX ORDER — LABETALOL HYDROCHLORIDE 5 MG/ML
INJECTION, SOLUTION INTRAVENOUS
Status: COMPLETED
Start: 2023-10-20 | End: 2023-10-20

## 2023-10-20 RX ORDER — CALCIUM GLUCONATE 98 MG/ML
1 INJECTION, SOLUTION INTRAVENOUS ONCE AS NEEDED
Status: DISCONTINUED | OUTPATIENT
Start: 2023-10-20 | End: 2023-10-30 | Stop reason: ALTCHOICE

## 2023-10-20 RX ORDER — NIFEDIPINE 10 MG/1
10 CAPSULE ORAL ONCE AS NEEDED
Status: DISCONTINUED | OUTPATIENT
Start: 2023-10-20 | End: 2023-10-20 | Stop reason: HOSPADM

## 2023-10-20 RX ORDER — METOCLOPRAMIDE HYDROCHLORIDE 5 MG/ML
10 INJECTION INTRAMUSCULAR; INTRAVENOUS EVERY 6 HOURS PRN
Status: DISCONTINUED | OUTPATIENT
Start: 2023-10-20 | End: 2023-11-03

## 2023-10-20 RX ORDER — ONDANSETRON HYDROCHLORIDE 2 MG/ML
4 INJECTION, SOLUTION INTRAVENOUS EVERY 6 HOURS PRN
Status: DISCONTINUED | OUTPATIENT
Start: 2023-10-20 | End: 2023-10-20

## 2023-10-20 RX ORDER — SIMETHICONE 80 MG
80 TABLET,CHEWABLE ORAL 4 TIMES DAILY PRN
Status: DISCONTINUED | OUTPATIENT
Start: 2023-10-20 | End: 2023-10-20

## 2023-10-20 RX ORDER — BETAMETHASONE SODIUM PHOSPHATE AND BETAMETHASONE ACETATE 3; 3 MG/ML; MG/ML
12 INJECTION, SUSPENSION INTRA-ARTICULAR; INTRALESIONAL; INTRAMUSCULAR; SOFT TISSUE EVERY 24 HOURS
Status: COMPLETED | OUTPATIENT
Start: 2023-10-20 | End: 2023-10-21

## 2023-10-20 RX ORDER — ENOXAPARIN SODIUM 100 MG/ML
40 INJECTION SUBCUTANEOUS DAILY
Status: DISCONTINUED | OUTPATIENT
Start: 2023-10-21 | End: 2023-11-03

## 2023-10-20 RX ORDER — BISACODYL 10 MG/1
10 SUPPOSITORY RECTAL DAILY PRN
Status: DISCONTINUED | OUTPATIENT
Start: 2023-10-20 | End: 2023-10-20 | Stop reason: HOSPADM

## 2023-10-20 RX ORDER — LABETALOL HYDROCHLORIDE 5 MG/ML
20 INJECTION, SOLUTION INTRAVENOUS ONCE AS NEEDED
Status: DISCONTINUED | OUTPATIENT
Start: 2023-10-20 | End: 2023-10-20 | Stop reason: HOSPADM

## 2023-10-20 RX ORDER — NIFEDIPINE 30 MG/1
30 TABLET, FILM COATED, EXTENDED RELEASE ORAL
Status: DISCONTINUED | OUTPATIENT
Start: 2023-10-20 | End: 2023-10-20

## 2023-10-20 RX ORDER — SIMETHICONE 80 MG
80 TABLET,CHEWABLE ORAL 4 TIMES DAILY PRN
Status: DISCONTINUED | OUTPATIENT
Start: 2023-10-20 | End: 2023-11-03

## 2023-10-20 RX ORDER — ONDANSETRON 4 MG/1
4 TABLET, FILM COATED ORAL EVERY 6 HOURS PRN
Status: DISCONTINUED | OUTPATIENT
Start: 2023-10-20 | End: 2023-10-20

## 2023-10-20 RX ORDER — ONDANSETRON HYDROCHLORIDE 2 MG/ML
4 INJECTION, SOLUTION INTRAVENOUS EVERY 6 HOURS PRN
Status: DISCONTINUED | OUTPATIENT
Start: 2023-10-20 | End: 2023-10-20 | Stop reason: HOSPADM

## 2023-10-20 RX ORDER — METOCLOPRAMIDE 10 MG/1
10 TABLET ORAL EVERY 6 HOURS PRN
Status: DISCONTINUED | OUTPATIENT
Start: 2023-10-20 | End: 2023-11-03

## 2023-10-20 RX ORDER — ADHESIVE BANDAGE
10 BANDAGE TOPICAL
Status: DISCONTINUED | OUTPATIENT
Start: 2023-10-20 | End: 2023-10-20

## 2023-10-20 RX ORDER — INSULIN GLARGINE 100 [IU]/ML
28 INJECTION, SOLUTION SUBCUTANEOUS NIGHTLY
Status: DISCONTINUED | OUTPATIENT
Start: 2023-10-20 | End: 2023-10-20

## 2023-10-20 RX ORDER — ONDANSETRON 4 MG/1
4 TABLET, FILM COATED ORAL EVERY 6 HOURS PRN
Status: DISCONTINUED | OUTPATIENT
Start: 2023-10-20 | End: 2023-10-20 | Stop reason: HOSPADM

## 2023-10-20 RX ORDER — NIFEDIPINE 10 MG/1
10 CAPSULE ORAL ONCE AS NEEDED
Status: DISCONTINUED | OUTPATIENT
Start: 2023-10-20 | End: 2023-10-20

## 2023-10-20 RX ORDER — MAGNESIUM SULFATE HEPTAHYDRATE 40 MG/ML
2 INJECTION, SOLUTION INTRAVENOUS CONTINUOUS
Status: DISCONTINUED | OUTPATIENT
Start: 2023-10-20 | End: 2023-10-30 | Stop reason: ALTCHOICE

## 2023-10-20 RX ORDER — INSULIN LISPRO 100 [IU]/ML
10 INJECTION, SOLUTION INTRAVENOUS; SUBCUTANEOUS
Status: DISCONTINUED | OUTPATIENT
Start: 2023-10-21 | End: 2023-10-20

## 2023-10-20 RX ORDER — LIDOCAINE HYDROCHLORIDE 10 MG/ML
0.5 INJECTION INFILTRATION; PERINEURAL ONCE AS NEEDED
Status: DISCONTINUED | OUTPATIENT
Start: 2023-10-20 | End: 2023-10-20

## 2023-10-20 RX ORDER — INSULIN LISPRO 100 [IU]/ML
28 INJECTION, SOLUTION INTRAVENOUS; SUBCUTANEOUS
Status: DISCONTINUED | OUTPATIENT
Start: 2023-10-21 | End: 2023-10-20

## 2023-10-20 RX ORDER — POLYETHYLENE GLYCOL 3350 17 G/17G
17 POWDER, FOR SOLUTION ORAL 2 TIMES DAILY PRN
Status: DISCONTINUED | OUTPATIENT
Start: 2023-10-20 | End: 2023-11-03

## 2023-10-20 RX ORDER — BISACODYL 10 MG/1
10 SUPPOSITORY RECTAL DAILY PRN
Status: DISCONTINUED | OUTPATIENT
Start: 2023-10-20 | End: 2023-11-03

## 2023-10-20 RX ORDER — INSULIN LISPRO 100 [IU]/ML
18 INJECTION, SOLUTION INTRAVENOUS; SUBCUTANEOUS
Status: DISCONTINUED | OUTPATIENT
Start: 2023-10-21 | End: 2023-10-20

## 2023-10-20 RX ORDER — INSULIN LISPRO 100 [IU]/ML
22 INJECTION, SOLUTION INTRAVENOUS; SUBCUTANEOUS
Status: DISCONTINUED | OUTPATIENT
Start: 2023-10-21 | End: 2023-10-21

## 2023-10-20 RX ORDER — INSULIN GLARGINE 100 [IU]/ML
34 INJECTION, SOLUTION SUBCUTANEOUS NIGHTLY
Status: DISCONTINUED | OUTPATIENT
Start: 2023-10-20 | End: 2023-10-22

## 2023-10-20 RX ORDER — DEXTROSE 40 %
30 GEL (GRAM) ORAL
Status: DISCONTINUED | OUTPATIENT
Start: 2023-10-20 | End: 2023-11-03

## 2023-10-20 RX ORDER — HYDRALAZINE HYDROCHLORIDE 20 MG/ML
5 INJECTION INTRAMUSCULAR; INTRAVENOUS ONCE AS NEEDED
Status: DISCONTINUED | OUTPATIENT
Start: 2023-10-20 | End: 2023-10-20

## 2023-10-20 RX ORDER — NIFEDIPINE 60 MG/1
120 TABLET, FILM COATED, EXTENDED RELEASE ORAL ONCE
Status: COMPLETED | OUTPATIENT
Start: 2023-10-20 | End: 2023-10-20

## 2023-10-20 RX ORDER — POLYETHYLENE GLYCOL 3350 17 G/17G
17 POWDER, FOR SOLUTION ORAL 2 TIMES DAILY PRN
Status: DISCONTINUED | OUTPATIENT
Start: 2023-10-20 | End: 2023-10-20

## 2023-10-20 RX ORDER — INSULIN GLARGINE 100 [IU]/ML
22 INJECTION, SOLUTION SUBCUTANEOUS
Status: DISCONTINUED | OUTPATIENT
Start: 2023-10-21 | End: 2023-11-03

## 2023-10-20 RX ORDER — DEXTROSE 50 % IN WATER (D50W) INTRAVENOUS SYRINGE
25
Status: DISCONTINUED | OUTPATIENT
Start: 2023-10-20 | End: 2023-11-03

## 2023-10-20 RX ORDER — LIDOCAINE HYDROCHLORIDE 10 MG/ML
0.5 INJECTION INFILTRATION; PERINEURAL ONCE AS NEEDED
Status: DISCONTINUED | OUTPATIENT
Start: 2023-10-20 | End: 2023-11-03

## 2023-10-20 RX ORDER — DEXTROSE 40 %
15 GEL (GRAM) ORAL
Status: DISCONTINUED | OUTPATIENT
Start: 2023-10-20 | End: 2023-11-03

## 2023-10-20 RX ORDER — METOCLOPRAMIDE 10 MG/1
10 TABLET ORAL EVERY 6 HOURS PRN
Status: DISCONTINUED | OUTPATIENT
Start: 2023-10-20 | End: 2023-10-20

## 2023-10-20 RX ORDER — ADHESIVE BANDAGE
10 BANDAGE TOPICAL
Status: DISCONTINUED | OUTPATIENT
Start: 2023-10-20 | End: 2023-11-03

## 2023-10-20 RX ADMIN — SODIUM CHLORIDE, POTASSIUM CHLORIDE, SODIUM LACTATE AND CALCIUM CHLORIDE 75 ML/HR: 600; 310; 30; 20 INJECTION, SOLUTION INTRAVENOUS at 20:06

## 2023-10-20 RX ADMIN — INSULIN GLARGINE 34 UNITS: 100 INJECTION, SOLUTION SUBCUTANEOUS at 22:44

## 2023-10-20 RX ADMIN — LABETALOL HYDROCHLORIDE 20 MG: 5 INJECTION, SOLUTION INTRAVENOUS at 18:28

## 2023-10-20 RX ADMIN — INSULIN LISPRO 28 UNITS: 100 INJECTION, SOLUTION INTRAVENOUS; SUBCUTANEOUS at 21:45

## 2023-10-20 RX ADMIN — BETAMETHASONE ACETATE AND BETAMETHASONE SODIUM PHOSPHATE 12 MG: 3; 3 INJECTION, SUSPENSION INTRA-ARTICULAR; INTRALESIONAL; INTRAMUSCULAR; SOFT TISSUE at 20:08

## 2023-10-20 RX ADMIN — NIFEDIPINE 120 MG: 60 TABLET, FILM COATED, EXTENDED RELEASE ORAL at 21:15

## 2023-10-20 RX ADMIN — MAGNESIUM SULFATE HEPTAHYDRATE 2 G/HR: 40 INJECTION, SOLUTION INTRAVENOUS at 20:25

## 2023-10-20 RX ADMIN — LABETALOL HYDROCHLORIDE 40 MG: 5 INJECTION INTRAVENOUS at 18:57

## 2023-10-20 SDOH — SOCIAL STABILITY: SOCIAL INSECURITY: VERBAL ABUSE: DENIES

## 2023-10-20 SDOH — HEALTH STABILITY: MENTAL HEALTH: WISH TO BE DEAD (PAST 1 MONTH): NO

## 2023-10-20 SDOH — ECONOMIC STABILITY: HOUSING INSECURITY: DO YOU FEEL UNSAFE GOING BACK TO THE PLACE WHERE YOU ARE LIVING?: NO

## 2023-10-20 SDOH — SOCIAL STABILITY: SOCIAL INSECURITY: ARE THERE ANY APPARENT SIGNS OF INJURIES/BEHAVIORS THAT COULD BE RELATED TO ABUSE/NEGLECT?: NO

## 2023-10-20 SDOH — SOCIAL STABILITY: SOCIAL INSECURITY: HAVE YOU HAD THOUGHTS OF HARMING ANYONE ELSE?: NO

## 2023-10-20 SDOH — SOCIAL STABILITY: SOCIAL INSECURITY: DO YOU FEEL ANYONE HAS EXPLOITED OR TAKEN ADVANTAGE OF YOU FINANCIALLY OR OF YOUR PERSONAL PROPERTY?: NO

## 2023-10-20 SDOH — SOCIAL STABILITY: SOCIAL INSECURITY: DOES ANYONE TRY TO KEEP YOU FROM HAVING/CONTACTING OTHER FRIENDS OR DOING THINGS OUTSIDE YOUR HOME?: NO

## 2023-10-20 SDOH — SOCIAL STABILITY: SOCIAL INSECURITY: PHYSICAL ABUSE: DENIES

## 2023-10-20 SDOH — SOCIAL STABILITY: SOCIAL INSECURITY: ARE YOU OR HAVE YOU BEEN THREATENED OR ABUSED PHYSICALLY, EMOTIONALLY, OR SEXUALLY BY ANYONE?: NO

## 2023-10-20 SDOH — SOCIAL STABILITY: SOCIAL INSECURITY: HAS ANYONE EVER THREATENED TO HURT YOUR FAMILY OR YOUR PETS?: NO

## 2023-10-20 SDOH — HEALTH STABILITY: MENTAL HEALTH: HAVE YOU USED ANY SUBSTANCES (CANABIS, COCAINE, HEROIN, HALLUCINOGENS, INHALANTS, ETC.) IN THE PAST 12 MONTHS?: NO

## 2023-10-20 SDOH — HEALTH STABILITY: MENTAL HEALTH: STRENGTHS (MUST CHOOSE TWO): STABLE HOUSING;SUPPORT FROM FAMILY

## 2023-10-20 SDOH — HEALTH STABILITY: MENTAL HEALTH: HAVE YOU USED ANY PRESCRIPTION DRUGS OTHER THAN PRESCRIBED IN THE PAST 12 MONTHS?: NO

## 2023-10-20 SDOH — HEALTH STABILITY: MENTAL HEALTH: NON-SPECIFIC ACTIVE SUICIDAL THOUGHTS (PAST 1 MONTH): NO

## 2023-10-20 SDOH — HEALTH STABILITY: MENTAL HEALTH: SUICIDAL BEHAVIOR (LIFETIME): NO

## 2023-10-20 SDOH — SOCIAL STABILITY: SOCIAL INSECURITY: HAVE YOU HAD THOUGHTS OF HARMING ANYONE ELSE?: YES

## 2023-10-20 SDOH — SOCIAL STABILITY: SOCIAL INSECURITY: ABUSE SCREEN: ADULT

## 2023-10-20 ASSESSMENT — LIFESTYLE VARIABLES
SKIP TO QUESTIONS 9-10: 1
SKIP TO QUESTIONS 9-10: 1
AUDIT-C TOTAL SCORE: 0
HOW OFTEN DO YOU HAVE 6 OR MORE DRINKS ON ONE OCCASION: NEVER
AUDIT-C TOTAL SCORE: 0
HOW OFTEN DO YOU HAVE A DRINK CONTAINING ALCOHOL: NEVER
AUDIT-C TOTAL SCORE: 0
HOW OFTEN DO YOU HAVE A DRINK CONTAINING ALCOHOL: NEVER
HOW MANY STANDARD DRINKS CONTAINING ALCOHOL DO YOU HAVE ON A TYPICAL DAY: PATIENT DOES NOT DRINK
HOW OFTEN DO YOU HAVE 6 OR MORE DRINKS ON ONE OCCASION: NEVER
HOW MANY STANDARD DRINKS CONTAINING ALCOHOL DO YOU HAVE ON A TYPICAL DAY: PATIENT DOES NOT DRINK
AUDIT-C TOTAL SCORE: 0

## 2023-10-20 ASSESSMENT — PATIENT HEALTH QUESTIONNAIRE - PHQ9
2. FEELING DOWN, DEPRESSED OR HOPELESS: NOT AT ALL
1. LITTLE INTEREST OR PLEASURE IN DOING THINGS: NOT AT ALL
1. LITTLE INTEREST OR PLEASURE IN DOING THINGS: NOT AT ALL
2. FEELING DOWN, DEPRESSED OR HOPELESS: NOT AT ALL
SUM OF ALL RESPONSES TO PHQ9 QUESTIONS 1 & 2: 0
SUM OF ALL RESPONSES TO PHQ9 QUESTIONS 1 & 2: 0

## 2023-10-20 ASSESSMENT — PAIN SCALES - GENERAL
PAINLEVEL_OUTOF10: 0 - NO PAIN

## 2023-10-20 ASSESSMENT — ACTIVITIES OF DAILY LIVING (ADL): LACK_OF_TRANSPORTATION: NO

## 2023-10-20 NOTE — H&P
Obstetrical Admission History and Physical     Gabrielle David is a 38 y.o.  at 29w1d. MANUEL: 2024, by Last Menstrual Period. Estimated fetal weight: 940g. She has had prenatal care with Dr. Ham .    Chief Complaint: Decreased Fetal Movement and Hypertension    Assessment/Plan    siPEC with severe symptoms   -baseline HELLP labs wnl, P:C 0.11  -severe range BP in office and in triage requiring labetalol   -pending repeat 8pm labs  -Mag ggt    APLS   - Continue lovenox ppx 40 mg SC daily   - Continue  mg    A2DM   - A1c 5.1%   - Current regimen: Basaglar  and Lispro 10/18/24    H/o IUFD     History of classical CS   -planned for repeat     AMA   - Declines genetics or cfDNA  - Nml anatomy     H/O FGR   - Most recent growth @ 27w1d shows an EFW of 940 g (17%)     Recurrent UTI   - On ppx macrobid PNC    Dispo:  Admission for siPEC with severe features. Patient in agreement, all questions answered.     Principal Problem:    Preeclampsia complicating hypertension      Pregnancy Problems (from 23 to present)       Problem Noted Resolved    Preeclampsia complicating hypertension 10/20/2023 by Susy Mccormick,  No    Priority:  Medium      Antiphospholipid antibody syndrome complicating pregnancy (CMS/HCC) 10/5/2023 by PRINCE Hull-CNP No    Priority:  Medium      Overview Signed 10/20/2023  6:57 PM by Santos Booth MD     Currently on Lovenox 40mg daily.  bASA 162mg daily         Recurrent urinary tract infection affecting pregnancy in second trimester 10/5/2023 by PRINCE Hull-CNP No    Priority:  Medium      Overview Signed 10/20/2023  6:57 PM by Santos Booth MD     On ppx Macrobid abx this pregnancy         History of poor fetal growth 10/5/2023 by RYAN Hull No    Priority:  Medium      Overview Addendum 10/20/2023  6:56 PM by Santos Booth MD     H/O FGR in prior pregnancy   Last Growth US 10/6 EFW 940g 17% AC 20%         Multigravida of  advanced maternal age in second trimester 10/5/2023 by RYAN Hull No    Priority:  Medium      Overview Signed 10/20/2023  6:57 PM by Santos Booth MD     Declined cfDNA this pregnancy.         History of  section, classical 2023 by Stevo Low No    Priority:  Medium      Overview Signed 10/20/2023  6:52 PM by Santos Booth MD     For Repeat  if headed toward delivery.         High-risk pregnancy, elderly multigravida 2023 by Stevo Low No    Priority:  Medium      Insulin controlled gestational diabetes mellitus (GDM) during pregnancy, antepartum 2023 by Stevo Low No    Priority:  Medium      Overview Signed 10/20/2023  6:54 PM by Santos Booth MD     Current Regimen: Glargine , Lispro 10/18/24           Chronic hypertension in pregnancy 2023 by Stevo Low No    Priority:  Medium      History of IUFD 2023 by RYAN Hull No    Priority:  Medium      Overview Signed 10/5/2023 11:48 AM by RYAN Hull     IUFD while undergoing surveillance inpatient in first pregnancy         Severe preeclampsia, third trimester 10/20/2023 by Santos Booth MD No          Admit for inpatient care.  Diagnosis:   Diagnosed based on: severe range blood pressures requiring immediate treatment  Blood pressure goal: <160/110  Short acting medications received? IV labetalol, dose given: 20/40    Long acting antihypertensive: nifedipine 90  HELLP labs:  normal, pending 8pm recheck    Protein:Cr ratio: baseline P:C 0.11, pending new labs  Delivery planning: CS   corticosteroids: indicated  Magnesium for seizure prophylaxis: indicated  GBS prophylaxis:  pending GBS swab    Subjective   Gabrielle is here complaining of high blood pressure.  Hypertension symptoms:  asymptomatic    Decreased fetal movement. Denies vaginal bleeding., Denies contractions., Denies leaking of fluid.      Patient has history cHTN as well as  severe preeclampsia resulting in 24 week delivery and IUFD. She has been on oral nifedipine which has slowly been up-titrated from 30 BID to 90 mg. Patient presented for routine visit today where she was noted to have 1 severe range followed by mild range Bps. She also noted decreased fetal movement and was sent in to triage.   She notes that Bps have been well controlled throughout pregnancy and only recently have started becoming >130/90s. She denies HA, RUQ pain, SOB, vision changes. She also has gDM on insulin which she states BG have been well-controlled.        Obstetrical History   OB History    Para Term  AB Living   4 3   3   2   SAB IAB Ectopic Multiple Live Births           1      # Outcome Date GA Lbr Adama/2nd Weight Sex Delivery Anes PTL Lv   4 Current            3  21 36w2d  3090 g M CS-Unspec Spinal  TANGELA   2  18 24w5d  595 g M CS-Classical         Complications: Severe preeclampsia   1  06/10/17 23w2d   F Vag-Spont   FD      Complications: Severe preeclampsia, Fetal growth restriction antepartum       Past Medical History  Past Medical History:   Diagnosis Date    Abnormal biochemical finding on  screening of mother 2017    Abnormal biochemical finding on  screening of mother    Antiphospholipid syndrome during pregnancy (CMS/[facility])     Chronic hypertension affecting pregnancy     Encounter for supervision of normal pregnancy, unspecified, first trimester 2017    Encounter for pregnancy related examination in first trimester    Encounter for supervision of normal pregnancy, unspecified, first trimester 2018    Encounter for pregnancy related examination in first trimester    Gestational (pregnancy-induced) hypertension without significant proteinuria, second trimester 2018    PIH (pregnancy induced hypertension), second trimester    Maternal care for other (suspected) fetal abnormality and damage, fetal cardiac  anomalies, not applicable or unspecified 2017    Abnormal fetal echocardiogram affecting antepartum care of mother    Maternal care for other known or suspected poor fetal growth, unspecified trimester, not applicable or unspecified 2018    IUGR,     Obesity complicating pregnancy, unspecified trimester 2018    Obesity affecting pregnancy    Other conditions influencing health status 2017    No significant past medical history    Other conditions influencing health status 2017    No significant past surgical history    Personal history of other complications of pregnancy, childbirth and the puerperium 2018    History of pre-eclampsia    Personal history of other diseases of the nervous system and sense organs 2017    History of acute otitis media    Personal history of other diseases of urinary system 2017    History of hematuria    Pre-existing essential hypertension complicating pregnancy, unspecified trimester 2018    Pre-existing essential hypertension during pregnancy, antepartum    Premature separation of placenta, unspecified, second trimester 2018    Antepartum placental abruption in second trimester    Type 2 diabetes mellitus affecting pregnancy, antepartum     Urinary tract infection         Past Surgical History   Past Surgical History:   Procedure Laterality Date     SECTION, CLASSIC      CT HEAD ANGIO W AND WO IV CONTRAST  2017    CT HEAD ANGIO W AND WO IV CONTRAST 2017 [facility]PATIENT [facility]       Social History  Social History     Tobacco Use    Smoking status: Never    Smokeless tobacco: Never   Substance Use Topics    Alcohol use: Not Currently     Substance and Sexual Activity   Drug Use Never       Allergies  Patient has no known allergies.     Medications  Medications Prior to Admission   Medication Sig Dispense Refill Last Dose    acetaminophen (Tylenol) 500 mg tablet Take by mouth every 6 hours if needed.    "More than a month    aspirin 81 mg capsule Take by mouth.   10/20/2023    betamethasone dipropionate (Diprosone) 0.05 % lotion 1 Application       cholecalciferol (Vitamin D3) 25 MCG (1000 UT) capsule Take 1 capsule (25 mcg) by mouth once daily. (Patient not taking: Reported on 10/20/2023) 30 capsule 4 Past Week    enoxaparin (Lovenox) 40 mg/0.4 mL syringe Inject 0.4 mL (40 mg) under the skin once daily. (Patient not taking: Reported on 10/20/2023) 30 each 5     NIFEdipine ER (Adalat CC) 60 mg 24 hr tablet Take 1 tablet (60 mg) by mouth 2 times a day. Do not crush, chew, or split. (Patient not taking: Reported on 10/20/2023) 60 tablet 11 10/20/2023       Objective    Last Vitals  Temp Pulse Resp BP MAP O2 Sat   36.7 °C (98.1 °F) 82 18 (!) 183/97   98 %     Physical Examination  GENERAL: Examination reveals a well developed, well nourished, gravid female in no acute distress. She is alert and cooperative.  LUNGS: clear to auscultation bilaterally  HEART: regular rate and rhythm, S1, S2 normal, no murmur, click, rub or gallop  ABDOMEN: soft, gravid, nontender, nondistended, no abnormal masses, no epigastric pain  FHR is 142, with accelerations, and a category 1 tracing.    NEUROLOGICAL: alert, oriented, normal speech, no focal findings or movement disorder noted  PSYCHOLOGICAL: awake and alert; oriented to person, place, and time    Lab Review  No results found for: \"ABO\", \"LABRH\", \"ABSCRN\"  No results found for: \"WBC\", \"HGB\", \"HCT\", \"PLT\"  No results found for: \"GRPBSTREP\"  No results found for: \"GLUCOSE\", \"NA\", \"K\", \"CL\", \"CO2\", \"ANIONGAP\", \"BUN\", \"CREATININE\", \"EGFR\", \"CALCIUM\", \"ALBUMIN\", \"PROT\", \"ALKPHOS\", \"ALT\", \"AST\", \"BILITOT\"  No results found for: \"UTPCR\"    "

## 2023-10-20 NOTE — PROGRESS NOTES
Ms. David is a 39 yo  @ 29w1d with a hx of GDMA2 and cHTN who presents for a JONATHAN.     Pt doing ok today. Denies headaches, visual changes, RUQ pain. No LOF or VB. Reports decreased fetal movement over the past 48 hours.    BP (!) 145/95   Wt 89.4 kg (197 lb)   LMP 2023   BMI 36.03 kg/m²   Gen: NAD  Resp: nonlabored breathing  Cardiac: good peripheral perfusion  Abd: gravid  Psych: appropriate mood and affect  FHTs: 130s, mod variability, +accels, no decels  Aceitunas: quiet    A/P :    *APLS   - Continue lovenox ppx 40 mg SC daily through 6 week postpartum   - Continue  mg  - Plan for  testing at 32 wks, given prior IUFD could plan for sooner (see below)     A2DM   - A1c 5.1%   - Baseline HELLP labs, P:C WNL   - Current regimen, Basaglar  and Lispro 10/18/24  - Serial growth, twice weekly  testing at 32 wks     cHTN   - Baseline HELLP labs wnl, P:C 0.11  - Pt with one severe-range (not recorded) in clinic followed by two mild range Bps  - Denies headaches, visual changes, RUQ pain  - Due to elevations in Bps in the setting of a prior IUFD and decreased fetal movement I recommended pt present to OB Triage for prolonged monitoring  - Pt reports she needs to  her son at 1600 today  - Will send PreE labs and increase nifedipine to 60 mg BID  - Pt added on for NST - reactive  - Still encouraged pt to go to OB Triage due to risk of maternal stroke, MI as well as placental abruption and stillbirth. Pt in agreement with plan    H/o IUFD  - H/o IUFD in first pregnancy while undergoing surveillance at the hospital. Will plan for serial growth US starting at 24 wga.  testing as above    History of classical CS   - Will be for repeat CS 36.0-37.0 wga     AMA   - Declines genetics or cfDNA  - Nml anatomy    H/O FGR   - Plan for serial growth in third trimester   - Most recent growth @ 27w1d shows an EFW of 940 g (17%)    Recurrent UTI   - On ppx macrobid PNC    PNC  -  PNLs reviewed   - NT grossly normal. Aneuploidy screening declined.   - Nml anatomy    To OB Triage    Magdy Kimble MD  Maternal-Fetal Medicine

## 2023-10-20 NOTE — H&P
Obstetrical Admission History and Physical     Gabrielle David is a 38 y.o.  at 29w1d. MANUEL: 2024, by Last Menstrual Period. Estimated fetal weight: 940g. She has had prenatal care with Dr. Ham .    Chief Complaint: Severe preeclampsia    Assessment/Plan    siPEC w/ SF  -Dx by severe range BP' requiring IV treatment.  -IV labetalol 20/40 on admission  -HELLP labs neg x1, repeat set pending for   -Current BP Regimen: Nifedipine 90mg -> Uptitrated to Nifed 120mg on 10/20   -Admit to L&D, consented, scanned, Cephalic     APLS   - Continue lovenox ppx 40 mg SC daily   - Continue  mg    Maternal Wellbeing  -CBC & T&S collected on admission    A2DM   - A1c 5.1%   - Current regimen: Basaglar  and Lispro 10/18/24  - For fasting and 1hr pp BG's    Fetal Wellbeing  -Last Growth US: EFW 940g, AC 20%  -12 hr Mg gtt for fetal neurprotection  - Hx of IUFD @ 23wga  - Daily NST while inpatient    GBS Unknown  -Collected on admission     Prior Classical C/S  -For repeat CS x 3 if headed toward delivery     AMA   - Declined cfDNA  - Nml anatomy      Recurrent UTI   - On daily Macrobid ppx  - Asymptomatic on admission    Dispo:  Admission for siPEC with severe features. Patient in agreement, all questions answered.     D/w Dr. Shirin Booth MD PGY-3    Principal Problem:    Severe preeclampsia, third trimester  Active Problems:    Severe preeclampsia    Insomnia    History of  section, classical    High-risk pregnancy, elderly multigravida    Insulin controlled gestational diabetes mellitus (GDM) during pregnancy, antepartum    History of IUFD    Antiphospholipid antibody syndrome complicating pregnancy (CMS/HCC)    History of poor fetal growth    Multigravida of advanced maternal age in second trimester      Pregnancy Problems (from 23 to present)       Problem Noted Resolved    Preeclampsia complicating hypertension 10/20/2023 by Susy Mccormick, DO No    Priority:  Medium       Antiphospholipid antibody syndrome complicating pregnancy (CMS/HCC) 10/5/2023 by RYAN Hull No    Priority:  Medium      Overview Signed 10/20/2023  6:57 PM by Santos Booth MD     Currently on Lovenox 40mg daily.  bASA 162mg daily         Recurrent urinary tract infection affecting pregnancy in second trimester 10/5/2023 by RYAN Hull No    Priority:  Medium      Overview Signed 10/20/2023  6:57 PM by Santos Booth MD     On ppx Macrobid abx this pregnancy         History of poor fetal growth 10/5/2023 by RYAN Hull No    Priority:  Medium      Overview Addendum 10/20/2023  6:56 PM by Santos Booth MD     H/O FGR in prior pregnancy   Last Growth US 10/6 EFW 940g 17% AC 20%         Multigravida of advanced maternal age in second trimester 10/5/2023 by RYAN uHll No    Priority:  Medium      Overview Signed 10/20/2023  6:57 PM by Santos Booth MD     Declined cfDNA this pregnancy.         History of  section, classical 2023 by Stevo Low No    Priority:  Medium      Overview Signed 10/20/2023  6:52 PM by Santos Booth MD     For Repeat  if headed toward delivery.         High-risk pregnancy, elderly multigravida 2023 by Stevo Low No    Priority:  Medium      Insulin controlled gestational diabetes mellitus (GDM) during pregnancy, antepartum 2023 by Stevo Low No    Priority:  Medium      Overview Signed 10/20/2023  6:54 PM by Santos Booth MD     Current Regimen: Glargine , Lispro 10/18/24           Chronic hypertension in pregnancy 2023 by Stevo Low No    Priority:  Medium      History of IUFD 2023 by RYAN Hull No    Priority:  Medium      Overview Signed 10/5/2023 11:48 AM by RYAN Hull     IUFD while undergoing surveillance inpatient in first pregnancy         Severe preeclampsia, third trimester 10/20/2023 by Santos Booth MD  No          Admit for inpatient care.  Diagnosis:   Diagnosed based on: severe range blood pressures requiring immediate treatment  Blood pressure goal: <160/110  Short acting medications received? IV labetalol, dose given: 20/40    Long acting antihypertensive: nifedipine 90  HELLP labs:  normal, pending 8pm recheck    Protein:Cr ratio: baseline P:C 0.11, pending new labs  Delivery planning: CS   corticosteroids: indicated  Magnesium for seizure prophylaxis: indicated  GBS prophylaxis:  pending GBS swab    Subjective   Gabrielle is here complaining of high blood pressure.  Hypertension symptoms:  asymptomatic    Decreased fetal movement. Denies vaginal bleeding., Denies contractions., Denies leaking of fluid.      Patient has history cHTN as well as severe preeclampsia resulting in 24 week delivery and IUFD. She has been on oral nifedipine which has slowly been up-titrated from 30 BID to 90 mg. Patient presented for routine visit today where she was noted to have 1 severe range followed by mild range Bps. She also noted decreased fetal movement and was sent in to triage.   She notes that Bps have been well controlled throughout pregnancy and only recently have started becoming >130/90s. She denies HA, RUQ pain, SOB, vision changes. She also has gDM on insulin which she states BG have been well-controlled.        Obstetrical History   OB History    Para Term  AB Living   4 3   3   2   SAB IAB Ectopic Multiple Live Births           1      # Outcome Date GA Lbr Adama/2nd Weight Sex Delivery Anes PTL Lv   4 Current            3  21 36w2d  3090 g M CS-Unspec Spinal  TANGELA   2  18 24w5d  595 g M CS-Classical         Complications: Severe preeclampsia   1  06/10/17 23w2d   F Vag-Spont   FD      Complications: Severe preeclampsia, Fetal growth restriction antepartum       Past Medical History  Past Medical History:   Diagnosis Date    Abnormal biochemical finding on   screening of mother 2017    Abnormal biochemical finding on  screening of mother    Antiphospholipid syndrome during pregnancy (CMS/HCC)     Chronic hypertension affecting pregnancy     Encounter for supervision of normal pregnancy, unspecified, first trimester 2017    Encounter for pregnancy related examination in first trimester    Encounter for supervision of normal pregnancy, unspecified, first trimester 2018    Encounter for pregnancy related examination in first trimester    Gestational (pregnancy-induced) hypertension without significant proteinuria, second trimester 2018    PIH (pregnancy induced hypertension), second trimester    Maternal care for other (suspected) fetal abnormality and damage, fetal cardiac anomalies, not applicable or unspecified 2017    Abnormal fetal echocardiogram affecting antepartum care of mother    Maternal care for other known or suspected poor fetal growth, unspecified trimester, not applicable or unspecified 2018    IUGR,     Obesity complicating pregnancy, unspecified trimester 2018    Obesity affecting pregnancy    Other conditions influencing health status 2017    No significant past medical history    Other conditions influencing health status 2017    No significant past surgical history    Personal history of other complications of pregnancy, childbirth and the puerperium 2018    History of pre-eclampsia    Personal history of other diseases of the nervous system and sense organs 2017    History of acute otitis media    Personal history of other diseases of urinary system 2017    History of hematuria    Pre-existing essential hypertension complicating pregnancy, unspecified trimester 2018    Pre-existing essential hypertension during pregnancy, antepartum    Premature separation of placenta, unspecified, second trimester 2018    Antepartum placental abruption in second  trimester    Type 2 diabetes mellitus affecting pregnancy, antepartum     Urinary tract infection         Past Surgical History   Past Surgical History:   Procedure Laterality Date     SECTION, CLASSIC      CT HEAD ANGIO W AND WO IV CONTRAST  2017    CT HEAD ANGIO W AND WO IV CONTRAST 2017 [facility] [place] INPATIENT LEGACY       Social History  Social History     Tobacco Use    Smoking status: Never    Smokeless tobacco: Never   Substance Use Topics    Alcohol use: Not Currently     Substance and Sexual Activity   Drug Use Never       Allergies  Patient has no known allergies.     Medications  Medications Prior to Admission   Medication Sig Dispense Refill Last Dose    acetaminophen (Tylenol) 500 mg tablet Take by mouth every 6 hours if needed.   Unknown    aspirin 81 mg capsule Take by mouth.   Unknown    betamethasone dipropionate (Diprosone) 0.05 % lotion 1 Application   Unknown    cholecalciferol (Vitamin D3) 25 MCG (1000 UT) capsule Take 1 capsule (25 mcg) by mouth once daily. (Patient not taking: Reported on 10/20/2023) 30 capsule 4 Unknown    enoxaparin (Lovenox) 40 mg/0.4 mL syringe Inject 0.4 mL (40 mg) under the skin once daily. (Patient not taking: Reported on 10/20/2023) 30 each 5 Unknown    NIFEdipine ER (Adalat CC) 60 mg 24 hr tablet Take 1 tablet (60 mg) by mouth 2 times a day. Do not crush, chew, or split. (Patient not taking: Reported on 10/20/2023) 60 tablet 11 Unknown       Objective    Last Vitals  Temp Pulse Resp BP MAP O2 Sat   36.8 °C (98.2 °F) 71   (!) 152/87   99 %     Physical Examination  GENERAL: Examination reveals a well developed, well nourished, gravid female in no acute distress. She is alert and cooperative.  LUNGS: clear to auscultation bilaterally  HEART: regular rate and rhythm, S1, S2 normal, no murmur, click, rub or gallop  ABDOMEN: soft, gravid, nontender, nondistended, no abnormal masses, no epigastric pain  FHR is 142, with accelerations, and a category 1 tracing.   "  NEUROLOGICAL: alert, oriented, normal speech, no focal findings or movement disorder noted  PSYCHOLOGICAL: awake and alert; oriented to person, place, and time    Lab Review  No results found for: \"ABO\", \"LABRH\", \"ABSCRN\"  No results found for: \"WBC\", \"HGB\", \"HCT\", \"PLT\"  No results found for: \"GRPBSTREP\"  No results found for: \"GLUCOSE\", \"NA\", \"K\", \"CL\", \"CO2\", \"ANIONGAP\", \"BUN\", \"CREATININE\", \"EGFR\", \"CALCIUM\", \"ALBUMIN\", \"PROT\", \"ALKPHOS\", \"ALT\", \"AST\", \"BILITOT\"  No results found for: \"UTPCR\"    "

## 2023-10-21 LAB
GLUCOSE BLD MANUAL STRIP-MCNC: 110 MG/DL (ref 74–99)
GLUCOSE BLD MANUAL STRIP-MCNC: 116 MG/DL (ref 74–99)
GLUCOSE BLD MANUAL STRIP-MCNC: 135 MG/DL (ref 74–99)
GLUCOSE BLD MANUAL STRIP-MCNC: 182 MG/DL (ref 74–99)
GLUCOSE BLD MANUAL STRIP-MCNC: 196 MG/DL (ref 74–99)

## 2023-10-21 PROCEDURE — 96372 THER/PROPH/DIAG INJ SC/IM: CPT | Performed by: STUDENT IN AN ORGANIZED HEALTH CARE EDUCATION/TRAINING PROGRAM

## 2023-10-21 PROCEDURE — 2500000004 HC RX 250 GENERAL PHARMACY W/ HCPCS (ALT 636 FOR OP/ED): Performed by: STUDENT IN AN ORGANIZED HEALTH CARE EDUCATION/TRAINING PROGRAM

## 2023-10-21 PROCEDURE — 59025 FETAL NON-STRESS TEST: CPT | Mod: GC

## 2023-10-21 PROCEDURE — 2500000002 HC RX 250 W HCPCS SELF ADMINISTERED DRUGS (ALT 637 FOR MEDICARE OP, ALT 636 FOR OP/ED): Performed by: STUDENT IN AN ORGANIZED HEALTH CARE EDUCATION/TRAINING PROGRAM

## 2023-10-21 PROCEDURE — 82947 ASSAY GLUCOSE BLOOD QUANT: CPT

## 2023-10-21 PROCEDURE — 1210000001 HC SEMI-PRIVATE ROOM DAILY

## 2023-10-21 PROCEDURE — 2500000001 HC RX 250 WO HCPCS SELF ADMINISTERED DRUGS (ALT 637 FOR MEDICARE OP): Performed by: STUDENT IN AN ORGANIZED HEALTH CARE EDUCATION/TRAINING PROGRAM

## 2023-10-21 PROCEDURE — 99232 SBSQ HOSP IP/OBS MODERATE 35: CPT

## 2023-10-21 PROCEDURE — 82947 ASSAY GLUCOSE BLOOD QUANT: CPT | Mod: CMCLAB

## 2023-10-21 PROCEDURE — 99222 1ST HOSP IP/OBS MODERATE 55: CPT

## 2023-10-21 PROCEDURE — 59025 FETAL NON-STRESS TEST: CPT

## 2023-10-21 RX ORDER — INSULIN LISPRO 100 [IU]/ML
18 INJECTION, SOLUTION INTRAVENOUS; SUBCUTANEOUS
Status: DISCONTINUED | OUTPATIENT
Start: 2023-10-21 | End: 2023-10-21

## 2023-10-21 RX ORDER — NIFEDIPINE 60 MG/1
60 TABLET, FILM COATED, EXTENDED RELEASE ORAL ONCE
Status: COMPLETED | OUTPATIENT
Start: 2023-10-21 | End: 2023-10-21

## 2023-10-21 RX ORDER — NAPROXEN SODIUM 220 MG/1
162 TABLET, FILM COATED ORAL DAILY
Status: DISCONTINUED | OUTPATIENT
Start: 2023-10-21 | End: 2023-11-01

## 2023-10-21 RX ORDER — CEPHALEXIN 250 MG/1
250 CAPSULE ORAL DAILY
Status: DISCONTINUED | OUTPATIENT
Start: 2023-10-21 | End: 2023-11-03

## 2023-10-21 RX ORDER — CHOLECALCIFEROL (VITAMIN D3) 25 MCG
1000 TABLET ORAL DAILY
Status: DISCONTINUED | OUTPATIENT
Start: 2023-10-22 | End: 2023-11-03

## 2023-10-21 RX ORDER — INSULIN LISPRO 100 [IU]/ML
10 INJECTION, SOLUTION INTRAVENOUS; SUBCUTANEOUS
Status: DISCONTINUED | OUTPATIENT
Start: 2023-10-21 | End: 2023-10-21

## 2023-10-21 RX ORDER — INSULIN LISPRO 100 [IU]/ML
22 INJECTION, SOLUTION INTRAVENOUS; SUBCUTANEOUS
Status: DISCONTINUED | OUTPATIENT
Start: 2023-10-22 | End: 2023-10-27

## 2023-10-21 RX ORDER — INSULIN LISPRO 100 [IU]/ML
4 INJECTION, SOLUTION INTRAVENOUS; SUBCUTANEOUS ONCE
Status: COMPLETED | OUTPATIENT
Start: 2023-10-21 | End: 2023-10-21

## 2023-10-21 RX ORDER — INSULIN LISPRO 100 [IU]/ML
28 INJECTION, SOLUTION INTRAVENOUS; SUBCUTANEOUS
Status: DISCONTINUED | OUTPATIENT
Start: 2023-10-21 | End: 2023-10-30

## 2023-10-21 RX ORDER — NIFEDIPINE 60 MG/1
60 TABLET, FILM COATED, EXTENDED RELEASE ORAL EVERY 12 HOURS
Status: DISCONTINUED | OUTPATIENT
Start: 2023-10-21 | End: 2023-11-07 | Stop reason: HOSPADM

## 2023-10-21 RX ORDER — INSULIN LISPRO 100 [IU]/ML
12 INJECTION, SOLUTION INTRAVENOUS; SUBCUTANEOUS
Status: DISCONTINUED | OUTPATIENT
Start: 2023-10-22 | End: 2023-10-27

## 2023-10-21 RX ORDER — INSULIN LISPRO 100 [IU]/ML
24 INJECTION, SOLUTION INTRAVENOUS; SUBCUTANEOUS
Status: DISCONTINUED | OUTPATIENT
Start: 2023-10-21 | End: 2023-10-21

## 2023-10-21 RX ADMIN — INSULIN LISPRO 28 UNITS: 100 INJECTION, SOLUTION INTRAVENOUS; SUBCUTANEOUS at 18:54

## 2023-10-21 RX ADMIN — ASPIRIN 81 MG CHEWABLE TABLET 162 MG: 81 TABLET CHEWABLE at 13:23

## 2023-10-21 RX ADMIN — BETAMETHASONE ACETATE AND BETAMETHASONE SODIUM PHOSPHATE 12 MG: 3; 3 INJECTION, SUSPENSION INTRA-ARTICULAR; INTRALESIONAL; INTRAMUSCULAR; SOFT TISSUE at 20:01

## 2023-10-21 RX ADMIN — CEPHALEXIN 250 MG: 250 CAPSULE ORAL at 16:25

## 2023-10-21 RX ADMIN — NIFEDIPINE 60 MG: 60 TABLET, FILM COATED, EXTENDED RELEASE ORAL at 13:19

## 2023-10-21 RX ADMIN — MAGNESIUM SULFATE HEPTAHYDRATE 2 G/HR: 40 INJECTION, SOLUTION INTRAVENOUS at 02:57

## 2023-10-21 RX ADMIN — PRENATAL VIT W/ FE FUMARATE-FA TAB 27-0.8 MG 1 TABLET: 27-0.8 TAB at 10:23

## 2023-10-21 RX ADMIN — INSULIN LISPRO 18 UNITS: 100 INJECTION, SOLUTION INTRAVENOUS; SUBCUTANEOUS at 13:16

## 2023-10-21 RX ADMIN — ENOXAPARIN SODIUM 40 MG: 100 INJECTION SUBCUTANEOUS at 10:23

## 2023-10-21 RX ADMIN — INSULIN LISPRO 10 UNITS: 100 INJECTION, SOLUTION INTRAVENOUS; SUBCUTANEOUS at 10:24

## 2023-10-21 RX ADMIN — NIFEDIPINE 60 MG: 60 TABLET, EXTENDED RELEASE ORAL at 21:28

## 2023-10-21 RX ADMIN — INSULIN GLARGINE 34 UNITS: 100 INJECTION, SOLUTION SUBCUTANEOUS at 21:28

## 2023-10-21 RX ADMIN — INSULIN GLARGINE 22 UNITS: 100 INJECTION, SOLUTION SUBCUTANEOUS at 08:28

## 2023-10-21 RX ADMIN — INSULIN LISPRO 4 UNITS: 100 INJECTION, SOLUTION INTRAVENOUS; SUBCUTANEOUS at 04:41

## 2023-10-21 ASSESSMENT — PAIN SCALES - GENERAL
PAINLEVEL_OUTOF10: 0 - NO PAIN

## 2023-10-21 ASSESSMENT — PAIN - FUNCTIONAL ASSESSMENT: PAIN_FUNCTIONAL_ASSESSMENT: 0-10

## 2023-10-21 NOTE — PROGRESS NOTES
Antepartum Progress Note    Assessment/Plan   Gabrielle David is a 38 y.o.  at 29w2d. MANUEL: 2024, by Last Menstrual Period. Admitted for siPEC.    siPEC w/ SF  - Dx by severe range BPs requiring IV treatment  - IV labetalol 20/40 on admission  - HELLP labs neg x2, for twice weekly HELLP labs  - asymptomatic  - Current BP Regimen: Nifed 60 BID (10/20) -> will add labetalol 200 BID given persistent high mild range Bps over night  - Anticipate admission until delivery at 34wga pending clinical status     APLS   - Continue lovenox ppx 40 mg SC daily   - Continue  mg     A2DM   - A1c 5.1%   - Pregnancy regimen: Basaglar  and Lispro 10/18/24  - Regimen increased in the setting of BMZ: Glargine  + Lispro 28  - Continue fasting and 1hr pp BG's     AMA   - Declined cfDNA  - Nml anatomy      Recurrent UTI   - Continue daily Keflex suppression     IUP  - Daily NST while inpatient, AGA this AM  - Last Growth US 10/6: EFW 940g (17%), AC 20%  - For weekly BPPs, for next BPP Monday   - s/p 12hrs Mg GTT for fetal neuroprotection  - GBS pending 10/21  - Prior Classical C/S, For repeat CS x 3 if headed toward delivery  - for NICU cs on Monday     To be discussed with Dr. Jitendra Kaur MD  PGY-2, Obstetrics and Gynecology  Maternal Fetal Medicine, pager: [phone]      Principal Problem:    Severe preeclampsia, third trimester  Active Problems:    Severe preeclampsia    Insomnia    History of  section, classical    High-risk pregnancy, elderly multigravida    Insulin controlled gestational diabetes mellitus (GDM) during pregnancy, antepartum    History of IUFD    Antiphospholipid antibody syndrome complicating pregnancy (CMS/HCC)    History of poor fetal growth    Multigravida of advanced maternal age in second trimester    Pregnancy Problems (from 23 to present)       Problem Noted Resolved    Preeclampsia complicating hypertension 10/20/2023 by Susy Mccormick, DO No    Priority:   Medium      Severe preeclampsia, third trimester 10/20/2023 by Santos Booth MD No    Priority:  Medium      Antiphospholipid antibody syndrome complicating pregnancy (CMS/HCC) 10/5/2023 by RYAN Hull No    Priority:  Medium      Overview Signed 10/20/2023  6:57 PM by Santos Booth MD     Currently on Lovenox 40mg daily.  bASA 162mg daily         Recurrent urinary tract infection affecting pregnancy in second trimester 10/5/2023 by RYAN Hull No    Priority:  Medium      Overview Signed 10/20/2023  6:57 PM by Santos Booth MD     On ppx Macrobid abx this pregnancy         History of poor fetal growth 10/5/2023 by RYAN Hull No    Priority:  Medium      Overview Addendum 10/20/2023  6:56 PM by Santos Booth MD     H/O FGR in prior pregnancy   Last Growth US 10/6 EFW 940g 17% AC 20%         Multigravida of advanced maternal age in second trimester 10/5/2023 by RYAN Hull No    Priority:  Medium      Overview Signed 10/20/2023  6:57 PM by Santos Booth MD     Declined cfDNA this pregnancy.         History of  section, classical 2023 by Stevo Low No    Priority:  Medium      Overview Signed 10/20/2023  6:52 PM by Santos Booth MD     For Repeat  if headed toward delivery.         High-risk pregnancy, elderly multigravida 2023 by Stevo Low No    Priority:  Medium      Insulin controlled gestational diabetes mellitus (GDM) during pregnancy, antepartum 2023 by Stevo Low No    Priority:  Medium      Overview Signed 10/20/2023  6:54 PM by Santos Booth MD     Current Regimen: Glargine , Lispro 10/18/24           Chronic hypertension in pregnancy 2023 by Stevo Low No    Priority:  Medium      History of IUFD 2023 by RYAN Hull No    Priority:  Medium      Overview Signed 10/5/2023 11:48 AM by RYAN Hull     IUFD while undergoing  surveillance inpatient in first pregnancy                 Subjective   Feeling well this am. Denies ctx, LOF, VB, or decreased fetal movement. Denies HA, vision changes, CP, SOB, or RUQ pain.    Objective   Allergies:   Patient has no known allergies.    Last Vitals:  Temp Pulse Resp BP MAP Pulse Ox   37.1 °C (98.8 °F) 82 18 (!) 150/73   98 %     Vitals Min/Max Last 24 Hours:  Temp  Min: 36.4 °C (97.5 °F)  Max: 37.4 °C (99.3 °F)  Pulse  Min: 71  Max: 94  Resp  Min: 16  Max: 18  BP  Min: 124/72  Max: 171/90    Intake/Output:     Intake/Output Summary (Last 24 hours) at 10/21/2023 1941  Last data filed at 10/21/2023 0601  Gross per 24 hour   Intake 1362 ml   Output 1600 ml   Net -238 ml       Physical Exam:  General: Well appearing, alert  HEENT: normocephalic, EOMI, clear sclera  Cardio: Warm and well perfused  Resp: breathing comfortably on room air  Abd: gravid  Neuro: grossly intact, no focal deficits  Extremities: full ROM, no calf tenderness  Psych: A&O x3, appropriate mood and affect    Fetal Assessment  FHT: 130/mod/+accel/ 1 variable decel  [place]: quiet    Lab Data:  Lab Results   Component Value Date    WBC 8.5 10/20/2023    HGB 12.3 10/20/2023    HCT 36.4 10/20/2023     10/20/2023     Lab Results   Component Value Date    GLUCOSE 170 (H) 10/20/2023     10/20/2023    K 4.0 10/20/2023     10/20/2023    CO2 24 10/20/2023    ANIONGAP 13 10/20/2023    BUN 12 10/20/2023    CREATININE 0.61 10/20/2023    EGFR >90 10/20/2023    CALCIUM 8.8 10/20/2023    ALBUMIN 3.4 10/20/2023    PROT 6.2 (L) 10/20/2023    ALKPHOS 75 10/20/2023    ALT 17 10/20/2023    AST 15 10/20/2023    BILITOT 0.3 10/20/2023

## 2023-10-21 NOTE — CONSULTS
Obstetrical Consult    Reason for Consult: siPEC with SF    Assessment/Plan    Gabrielle David is a 38 y.o.  29w2d, admitted for newly diagnosed siPEC with SF.     siPEC w/ SF  -Dx by severe range BPs requiring IV treatment  -IV labetalol 20/40 on admission  -HELLP labs neg x2, for repeat labs this AM  -Current BP Regimen: Nifedipine 90mg -> Uptitrated to Nifed 120mg on 10/20   - Anticipate admission until delivery at 34wga pending clinical status     APLS   - Continue lovenox ppx 40 mg SC daily   - Continue  mg     A2DM   - A1c 5.1%   - Pregnancy regimen: Basaglar  and Lispro 10/18/24  - Regimen increased in the setting of BMZ: Glargine  + Lispro 28  - For fasting and 1hr pp BG's     AMA   - Declined cfDNA  - Nml anatomy      Recurrent UTI   - Continue daily Keflex suppression     IUP  - Daily NST while inpatient  - Last Growth US 10/6: EFW 940g (17%), AC 20%  - continue 12 hr Mg gtt for fetal neurprotection  - GBS pending 10/21  - Prior Classical C/S For repeat CS x 3 if headed toward delivery  - for NICU cs in the AM    To be discussed with Dr. Jitendra Kaur MD  PGY-2, Obstetrics and Gynecology  Maternal Fetal Medicine, pager: [phone]       Subjective   Patient feeling well this AM. Denies HA, vision changes, CP, SOB, or RUQ pain. Denies ctx, LOF, VB, or decreased fetal movement.    Pregnancy Notable For  -cHTN, previously on Nifedipine 90mg, on 162mg bASA  - APLS: Currently on Lovenox 40mg daily  -GDMA2--Currently on Glargine , Lispro 10/18/14  -Prior CS x 2: classical c/s @ 23wga in G2, LTCS @ 36wga G3  -Hx of sPEC in prior pregnancy  -Hx of 23wk IUFD G1,   - on keflex for UTI suppression    Obstetrical History   OB History    Para Term  AB Living   4 3   3   2   SAB IAB Ectopic Multiple Live Births           1      # Outcome Date GA Lbr Adama/2nd Weight Sex Delivery Anes PTL Lv   4 Current            3  21 36w2d  3090 g M CS-Unspec Spinal  TANGELA    2  18 24w5d  595 g M CS-Classical         Complications: Severe preeclampsia   1  06/10/17 23w2d   F Vag-Spont   FD      Complications: Severe preeclampsia, Fetal growth restriction antepartum       Past Medical History  Past Medical History:   Diagnosis Date    Abnormal biochemical finding on  screening of mother 2017    Abnormal biochemical finding on  screening of mother    Antiphospholipid syndrome during pregnancy (CMS/HCC)     Chronic hypertension affecting pregnancy     Encounter for supervision of normal pregnancy, unspecified, first trimester 2017    Encounter for pregnancy related examination in first trimester    Encounter for supervision of normal pregnancy, unspecified, first trimester 2018    Encounter for pregnancy related examination in first trimester    Gestational (pregnancy-induced) hypertension without significant proteinuria, second trimester 2018    PIH (pregnancy induced hypertension), second trimester    Maternal care for other (suspected) fetal abnormality and damage, fetal cardiac anomalies, not applicable or unspecified 2017    Abnormal fetal echocardiogram affecting antepartum care of mother    Maternal care for other known or suspected poor fetal growth, unspecified trimester, not applicable or unspecified 2018    IUGR,     Obesity complicating pregnancy, unspecified trimester 2018    Obesity affecting pregnancy    Other conditions influencing health status 2017    No significant past medical history    Other conditions influencing health status 2017    No significant past surgical history    Personal history of other complications of pregnancy, childbirth and the puerperium 2018    History of pre-eclampsia    Personal history of other diseases of the nervous system and sense organs 2017    History of acute otitis media    Personal history of other diseases of urinary  system 2017    History of hematuria    Pre-existing essential hypertension complicating pregnancy, unspecified trimester 2018    Pre-existing essential hypertension during pregnancy, antepartum    Premature separation of placenta, unspecified, second trimester 2018    Antepartum placental abruption in second trimester    Type 2 diabetes mellitus affecting pregnancy, antepartum     Urinary tract infection         Past Surgical History   Past Surgical History:   Procedure Laterality Date     SECTION, CLASSIC      CT HEAD ANGIO W AND WO IV CONTRAST  2017    CT HEAD ANGIO W AND WO IV CONTRAST 2017 [facility] [place] INPATIENT LEGACY       Social History  Social History     Tobacco Use    Smoking status: Never    Smokeless tobacco: Never   Substance Use Topics    Alcohol use: Not Currently     Substance and Sexual Activity   Drug Use Never       Allergies  Patient has no known allergies.     Medications  Medications Prior to Admission   Medication Sig Dispense Refill Last Dose    acetaminophen (Tylenol) 500 mg tablet Take by mouth every 6 hours if needed.   More than a month    aspirin 81 mg capsule Take by mouth.   10/19/2023    betamethasone dipropionate (Diprosone) 0.05 % lotion 1 Application   More than a month    cholecalciferol (Vitamin D3) 25 MCG (1000 UT) capsule Take 1 capsule (25 mcg) by mouth once daily. (Patient not taking: Reported on 10/20/2023) 30 capsule 4 10/19/2023    enoxaparin (Lovenox) 40 mg/0.4 mL syringe Inject 0.4 mL (40 mg) under the skin once daily. (Patient not taking: Reported on 10/20/2023) 30 each 5 10/19/2023    NIFEdipine ER (Adalat CC) 60 mg 24 hr tablet Take 1 tablet (60 mg) by mouth 2 times a day. Do not crush, chew, or split. (Patient not taking: Reported on 10/20/2023) 60 tablet 11 10/19/2023       Objective    Last Vitals  Temp Pulse Resp BP MAP O2 Sat   36.4 °C (97.5 °F) 94 16 (!) 142/81   96 %     Physical Examination  General: Well appearing, alert  HEENT:  normocephalic, EOMI, clear sclera  Cardio: Warm and well perfused  Resp: breathing comfortably on room air  Abd: gravid  Neuro: grossly intact, no focal deficits  Extremities: full ROM, no calf tenderness  Psych: A&O x3, appropriate mood and affect    Fetal Assessment  FHT: 130/mod/-accel/-decel  [place]: quiet  BSUS: BPP 8/8., SWAPNA 9.77      Lab Review  Lab Results   Component Value Date    WBC 8.5 10/20/2023    HGB 12.3 10/20/2023    HCT 36.4 10/20/2023     10/20/2023     Lab Results   Component Value Date    GLUCOSE 170 (H) 10/20/2023     10/20/2023    K 4.0 10/20/2023     10/20/2023    CO2 24 10/20/2023    ANIONGAP 13 10/20/2023    BUN 12 10/20/2023    CREATININE 0.61 10/20/2023    EGFR >90 10/20/2023    CALCIUM 8.8 10/20/2023    ALBUMIN 3.4 10/20/2023    PROT 6.2 (L) 10/20/2023    ALKPHOS 75 10/20/2023    ALT 17 10/20/2023    AST 15 10/20/2023    BILITOT 0.3 10/20/2023     P:C 0.18

## 2023-10-22 LAB
ALBUMIN SERPL BCP-MCNC: 3.3 G/DL (ref 3.4–5)
ALP SERPL-CCNC: 72 U/L (ref 33–110)
ALT SERPL W P-5'-P-CCNC: 15 U/L (ref 7–45)
ANION GAP SERPL CALC-SCNC: 16 MMOL/L (ref 10–20)
AST SERPL W P-5'-P-CCNC: 11 U/L (ref 9–39)
BILIRUB SERPL-MCNC: 0.3 MG/DL (ref 0–1.2)
BUN SERPL-MCNC: 13 MG/DL (ref 6–23)
CALCIUM SERPL-MCNC: 8.9 MG/DL (ref 8.6–10.6)
CHLORIDE SERPL-SCNC: 106 MMOL/L (ref 98–107)
CO2 SERPL-SCNC: 18 MMOL/L (ref 21–32)
CREAT SERPL-MCNC: 0.66 MG/DL (ref 0.5–1.05)
ERYTHROCYTE [DISTWIDTH] IN BLOOD BY AUTOMATED COUNT: 14.2 % (ref 11.5–14.5)
GFR SERPL CREATININE-BSD FRML MDRD: >90 ML/MIN/1.73M*2
GLUCOSE BLD MANUAL STRIP-MCNC: 113 MG/DL (ref 74–99)
GLUCOSE BLD MANUAL STRIP-MCNC: 145 MG/DL (ref 74–99)
GLUCOSE BLD MANUAL STRIP-MCNC: 156 MG/DL (ref 74–99)
GLUCOSE BLD MANUAL STRIP-MCNC: 160 MG/DL (ref 74–99)
GLUCOSE BLD MANUAL STRIP-MCNC: 185 MG/DL (ref 74–99)
GLUCOSE SERPL-MCNC: 137 MG/DL (ref 74–99)
HCT VFR BLD AUTO: 35.3 % (ref 36–46)
HGB BLD-MCNC: 11.7 G/DL (ref 12–16)
MCH RBC QN AUTO: 31.5 PG (ref 26–34)
MCHC RBC AUTO-ENTMCNC: 33.1 G/DL (ref 32–36)
MCV RBC AUTO: 95 FL (ref 80–100)
NRBC BLD-RTO: 0.2 /100 WBCS (ref 0–0)
PLATELET # BLD AUTO: 314 X10*3/UL (ref 150–450)
PMV BLD AUTO: 10.1 FL (ref 7.5–11.5)
POTASSIUM SERPL-SCNC: 4.2 MMOL/L (ref 3.5–5.3)
PROT SERPL-MCNC: 6.2 G/DL (ref 6.4–8.2)
RBC # BLD AUTO: 3.72 X10*6/UL (ref 4–5.2)
SODIUM SERPL-SCNC: 136 MMOL/L (ref 136–145)
WBC # BLD AUTO: 13.1 X10*3/UL (ref 4.4–11.3)

## 2023-10-22 PROCEDURE — 96372 THER/PROPH/DIAG INJ SC/IM: CPT | Performed by: STUDENT IN AN ORGANIZED HEALTH CARE EDUCATION/TRAINING PROGRAM

## 2023-10-22 PROCEDURE — 82947 ASSAY GLUCOSE BLOOD QUANT: CPT | Mod: CMCLAB

## 2023-10-22 PROCEDURE — 36415 COLL VENOUS BLD VENIPUNCTURE: CPT | Mod: CMCLAB | Performed by: STUDENT IN AN ORGANIZED HEALTH CARE EDUCATION/TRAINING PROGRAM

## 2023-10-22 PROCEDURE — 2500000001 HC RX 250 WO HCPCS SELF ADMINISTERED DRUGS (ALT 637 FOR MEDICARE OP)

## 2023-10-22 PROCEDURE — 36415 COLL VENOUS BLD VENIPUNCTURE: CPT | Performed by: STUDENT IN AN ORGANIZED HEALTH CARE EDUCATION/TRAINING PROGRAM

## 2023-10-22 PROCEDURE — 85027 COMPLETE CBC AUTOMATED: CPT | Performed by: STUDENT IN AN ORGANIZED HEALTH CARE EDUCATION/TRAINING PROGRAM

## 2023-10-22 PROCEDURE — 2500000001 HC RX 250 WO HCPCS SELF ADMINISTERED DRUGS (ALT 637 FOR MEDICARE OP): Performed by: STUDENT IN AN ORGANIZED HEALTH CARE EDUCATION/TRAINING PROGRAM

## 2023-10-22 PROCEDURE — 1210000001 HC SEMI-PRIVATE ROOM DAILY

## 2023-10-22 PROCEDURE — 2500000004 HC RX 250 GENERAL PHARMACY W/ HCPCS (ALT 636 FOR OP/ED): Performed by: STUDENT IN AN ORGANIZED HEALTH CARE EDUCATION/TRAINING PROGRAM

## 2023-10-22 PROCEDURE — 80053 COMPREHEN METABOLIC PANEL: CPT | Performed by: STUDENT IN AN ORGANIZED HEALTH CARE EDUCATION/TRAINING PROGRAM

## 2023-10-22 RX ORDER — LABETALOL 100 MG/1
200 TABLET, FILM COATED ORAL ONCE
Status: COMPLETED | OUTPATIENT
Start: 2023-10-22 | End: 2023-10-22

## 2023-10-22 RX ORDER — LABETALOL 100 MG/1
200 TABLET, FILM COATED ORAL 2 TIMES DAILY
Status: DISCONTINUED | OUTPATIENT
Start: 2023-10-22 | End: 2023-10-22

## 2023-10-22 RX ORDER — INSULIN GLARGINE 100 [IU]/ML
40 INJECTION, SOLUTION SUBCUTANEOUS NIGHTLY
Status: DISCONTINUED | OUTPATIENT
Start: 2023-10-22 | End: 2023-10-26

## 2023-10-22 RX ORDER — LABETALOL 100 MG/1
400 TABLET, FILM COATED ORAL 2 TIMES DAILY
Status: DISCONTINUED | OUTPATIENT
Start: 2023-10-23 | End: 2023-10-30

## 2023-10-22 RX ADMIN — INSULIN LISPRO 22 UNITS: 100 INJECTION, SOLUTION INTRAVENOUS; SUBCUTANEOUS at 13:41

## 2023-10-22 RX ADMIN — INSULIN GLARGINE 22 UNITS: 100 INJECTION, SOLUTION SUBCUTANEOUS at 08:43

## 2023-10-22 RX ADMIN — PRENATAL VIT W/ FE FUMARATE-FA TAB 27-0.8 MG 1 TABLET: 27-0.8 TAB at 08:37

## 2023-10-22 RX ADMIN — LABETALOL HYDROCHLORIDE 200 MG: 100 TABLET, FILM COATED ORAL at 16:48

## 2023-10-22 RX ADMIN — INSULIN LISPRO 12 UNITS: 100 INJECTION, SOLUTION INTRAVENOUS; SUBCUTANEOUS at 09:53

## 2023-10-22 RX ADMIN — INSULIN GLARGINE 40 UNITS: 100 INJECTION, SOLUTION SUBCUTANEOUS at 21:06

## 2023-10-22 RX ADMIN — NIFEDIPINE 60 MG: 60 TABLET, EXTENDED RELEASE ORAL at 22:02

## 2023-10-22 RX ADMIN — LABETALOL HYDROCHLORIDE 200 MG: 100 TABLET, FILM COATED ORAL at 17:38

## 2023-10-22 RX ADMIN — ENOXAPARIN SODIUM 40 MG: 100 INJECTION SUBCUTANEOUS at 08:39

## 2023-10-22 RX ADMIN — NIFEDIPINE 60 MG: 60 TABLET, EXTENDED RELEASE ORAL at 09:53

## 2023-10-22 RX ADMIN — CEPHALEXIN 250 MG: 250 CAPSULE ORAL at 08:36

## 2023-10-22 RX ADMIN — ASPIRIN 81 MG CHEWABLE TABLET 162 MG: 81 TABLET CHEWABLE at 08:36

## 2023-10-22 RX ADMIN — Medication 1000 UNITS: at 08:37

## 2023-10-22 RX ADMIN — LABETALOL HYDROCHLORIDE 200 MG: 100 TABLET, FILM COATED ORAL at 06:31

## 2023-10-22 ASSESSMENT — PAIN - FUNCTIONAL ASSESSMENT
PAIN_FUNCTIONAL_ASSESSMENT: CPOT (CRITICAL CARE PAIN OBSERVATION TOOL)
PAIN_FUNCTIONAL_ASSESSMENT: 0-10

## 2023-10-22 ASSESSMENT — PAIN SCALES - GENERAL
PAINLEVEL_OUTOF10: 0 - NO PAIN

## 2023-10-22 NOTE — CARE PLAN
The patient's goals for the shift include minimal s/sx of HDP    The clinical goals for the shift include minimal s/sx of HDP

## 2023-10-22 NOTE — CARE PLAN
Problem: Diabetes  Goal: Maintain glucose levels >70mg/dl to <250mg/dl throughout shift  Flowsheets (Taken 10/21/2023 2256)  Maintain glucose levels >70mg/dl to <250mg/dl throughout shift:   Med administration/monitoring of effect   Self monitor blood glucose with staff oversight     Problem: Hypertensive Disorder of Pregnancy (HDP)  Goal: Minimal s/sx of HDP and BP<160/110  Flowsheets (Taken 10/21/2023 9189)  Minimal s/sx of HDP and BP <160/110:   Med administration/monitoring of effect   Monitor for s/sx of worsening HDP   The patient's goals for the shift include minimal s/sx of HDP    The clinical goals for the shift include minimal s/sx of HDP    Over the shift, the patient did make progress toward the following goals.

## 2023-10-23 ENCOUNTER — APPOINTMENT (OUTPATIENT)
Dept: RADIOLOGY | Facility: HOSPITAL | Age: 38
End: 2023-10-23
Payer: OTHER GOVERNMENT

## 2023-10-23 LAB
ABO GROUP (TYPE) IN BLOOD: NORMAL
ALBUMIN SERPL BCP-MCNC: 3.3 G/DL (ref 3.4–5)
ALP SERPL-CCNC: 71 U/L (ref 33–110)
ALT SERPL W P-5'-P-CCNC: 16 U/L (ref 7–45)
ANION GAP SERPL CALC-SCNC: 14 MMOL/L (ref 10–20)
ANTIBODY SCREEN: NORMAL
AST SERPL W P-5'-P-CCNC: 12 U/L (ref 9–39)
BILIRUB SERPL-MCNC: 0.3 MG/DL (ref 0–1.2)
BUN SERPL-MCNC: 13 MG/DL (ref 6–23)
CALCIUM SERPL-MCNC: 9.2 MG/DL (ref 8.6–10.6)
CHLORIDE SERPL-SCNC: 106 MMOL/L (ref 98–107)
CO2 SERPL-SCNC: 25 MMOL/L (ref 21–32)
CREAT SERPL-MCNC: 0.76 MG/DL (ref 0.5–1.05)
ERYTHROCYTE [DISTWIDTH] IN BLOOD BY AUTOMATED COUNT: 14.6 % (ref 11.5–14.5)
GFR SERPL CREATININE-BSD FRML MDRD: >90 ML/MIN/1.73M*2
GLUCOSE BLD MANUAL STRIP-MCNC: 100 MG/DL (ref 74–99)
GLUCOSE BLD MANUAL STRIP-MCNC: 110 MG/DL (ref 74–99)
GLUCOSE BLD MANUAL STRIP-MCNC: 130 MG/DL (ref 74–99)
GLUCOSE BLD MANUAL STRIP-MCNC: 87 MG/DL (ref 74–99)
GLUCOSE SERPL-MCNC: 71 MG/DL (ref 74–99)
GP B STREP GENITAL QL CULT: NORMAL
HCT VFR BLD AUTO: 36.8 % (ref 36–46)
HGB BLD-MCNC: 11.8 G/DL (ref 12–16)
MCH RBC QN AUTO: 31.6 PG (ref 26–34)
MCHC RBC AUTO-ENTMCNC: 32.1 G/DL (ref 32–36)
MCV RBC AUTO: 98 FL (ref 80–100)
NRBC BLD-RTO: 0.3 /100 WBCS (ref 0–0)
PLATELET # BLD AUTO: 318 X10*3/UL (ref 150–450)
PMV BLD AUTO: 10.3 FL (ref 7.5–11.5)
POTASSIUM SERPL-SCNC: 5.1 MMOL/L (ref 3.5–5.3)
PROT SERPL-MCNC: 6.3 G/DL (ref 6.4–8.2)
RBC # BLD AUTO: 3.74 X10*6/UL (ref 4–5.2)
RH FACTOR (ANTIGEN D): NORMAL
SODIUM SERPL-SCNC: 140 MMOL/L (ref 136–145)
WBC # BLD AUTO: 9.8 X10*3/UL (ref 4.4–11.3)

## 2023-10-23 PROCEDURE — 96372 THER/PROPH/DIAG INJ SC/IM: CPT | Performed by: STUDENT IN AN ORGANIZED HEALTH CARE EDUCATION/TRAINING PROGRAM

## 2023-10-23 PROCEDURE — 36415 COLL VENOUS BLD VENIPUNCTURE: CPT | Mod: CMCLAB | Performed by: STUDENT IN AN ORGANIZED HEALTH CARE EDUCATION/TRAINING PROGRAM

## 2023-10-23 PROCEDURE — 86900 BLOOD TYPING SEROLOGIC ABO: CPT | Performed by: STUDENT IN AN ORGANIZED HEALTH CARE EDUCATION/TRAINING PROGRAM

## 2023-10-23 PROCEDURE — 76818 FETAL BIOPHYS PROFILE W/NST: CPT

## 2023-10-23 PROCEDURE — 1210000001 HC SEMI-PRIVATE ROOM DAILY

## 2023-10-23 PROCEDURE — 2500000001 HC RX 250 WO HCPCS SELF ADMINISTERED DRUGS (ALT 637 FOR MEDICARE OP)

## 2023-10-23 PROCEDURE — 59025 FETAL NON-STRESS TEST: CPT | Mod: GC

## 2023-10-23 PROCEDURE — 2500000002 HC RX 250 W HCPCS SELF ADMINISTERED DRUGS (ALT 637 FOR MEDICARE OP, ALT 636 FOR OP/ED): Performed by: OBSTETRICS & GYNECOLOGY

## 2023-10-23 PROCEDURE — 99232 SBSQ HOSP IP/OBS MODERATE 35: CPT

## 2023-10-23 PROCEDURE — 59025 FETAL NON-STRESS TEST: CPT

## 2023-10-23 PROCEDURE — 2500000004 HC RX 250 GENERAL PHARMACY W/ HCPCS (ALT 636 FOR OP/ED): Performed by: STUDENT IN AN ORGANIZED HEALTH CARE EDUCATION/TRAINING PROGRAM

## 2023-10-23 PROCEDURE — 2500000001 HC RX 250 WO HCPCS SELF ADMINISTERED DRUGS (ALT 637 FOR MEDICARE OP): Performed by: STUDENT IN AN ORGANIZED HEALTH CARE EDUCATION/TRAINING PROGRAM

## 2023-10-23 PROCEDURE — 80053 COMPREHEN METABOLIC PANEL: CPT | Performed by: STUDENT IN AN ORGANIZED HEALTH CARE EDUCATION/TRAINING PROGRAM

## 2023-10-23 PROCEDURE — 96372 THER/PROPH/DIAG INJ SC/IM: CPT | Performed by: OBSTETRICS & GYNECOLOGY

## 2023-10-23 PROCEDURE — 99221 1ST HOSP IP/OBS SF/LOW 40: CPT | Performed by: PEDIATRICS

## 2023-10-23 PROCEDURE — 76819 FETAL BIOPHYS PROFIL W/O NST: CPT | Performed by: OBSTETRICS & GYNECOLOGY

## 2023-10-23 PROCEDURE — 82947 ASSAY GLUCOSE BLOOD QUANT: CPT | Mod: CMCLAB

## 2023-10-23 PROCEDURE — 85027 COMPLETE CBC AUTOMATED: CPT | Mod: CMCLAB | Performed by: STUDENT IN AN ORGANIZED HEALTH CARE EDUCATION/TRAINING PROGRAM

## 2023-10-23 RX ADMIN — INSULIN GLARGINE 22 UNITS: 100 INJECTION, SOLUTION SUBCUTANEOUS at 09:22

## 2023-10-23 RX ADMIN — ENOXAPARIN SODIUM 40 MG: 100 INJECTION SUBCUTANEOUS at 09:15

## 2023-10-23 RX ADMIN — PRENATAL VIT W/ FE FUMARATE-FA TAB 27-0.8 MG 1 TABLET: 27-0.8 TAB at 09:15

## 2023-10-23 RX ADMIN — ASPIRIN 81 MG CHEWABLE TABLET 162 MG: 81 TABLET CHEWABLE at 09:14

## 2023-10-23 RX ADMIN — INSULIN LISPRO 22 UNITS: 100 INJECTION, SOLUTION INTRAVENOUS; SUBCUTANEOUS at 13:34

## 2023-10-23 RX ADMIN — LABETALOL HYDROCHLORIDE 400 MG: 100 TABLET, FILM COATED ORAL at 09:14

## 2023-10-23 RX ADMIN — Medication 1000 UNITS: at 09:16

## 2023-10-23 RX ADMIN — NIFEDIPINE 60 MG: 60 TABLET, EXTENDED RELEASE ORAL at 10:00

## 2023-10-23 RX ADMIN — NIFEDIPINE 60 MG: 60 TABLET, EXTENDED RELEASE ORAL at 21:29

## 2023-10-23 RX ADMIN — INSULIN GLARGINE 40 UNITS: 100 INJECTION, SOLUTION SUBCUTANEOUS at 21:31

## 2023-10-23 RX ADMIN — LABETALOL HYDROCHLORIDE 400 MG: 100 TABLET, FILM COATED ORAL at 21:29

## 2023-10-23 RX ADMIN — CEPHALEXIN 250 MG: 250 CAPSULE ORAL at 09:14

## 2023-10-23 RX ADMIN — INSULIN LISPRO 28 UNITS: 100 INJECTION, SOLUTION INTRAVENOUS; SUBCUTANEOUS at 19:58

## 2023-10-23 RX ADMIN — INSULIN LISPRO 12 UNITS: 100 INJECTION, SOLUTION INTRAVENOUS; SUBCUTANEOUS at 09:25

## 2023-10-23 ASSESSMENT — PAIN SCALES - GENERAL
PAINLEVEL_OUTOF10: 0 - NO PAIN

## 2023-10-23 ASSESSMENT — PAIN - FUNCTIONAL ASSESSMENT
PAIN_FUNCTIONAL_ASSESSMENT: 0-10

## 2023-10-23 NOTE — CARE PLAN
The patient's goals for the shift include follow medication plan    The clinical goals for the shift include blood sugar regulation

## 2023-10-23 NOTE — CARE PLAN
The patient's goals for the shift include follow medication plan    The clinical goals for the shift include BP WNL, pt remains as/sx and BS controlled    Pt BP remain WNL throughout shift with the exception of x1 mild range this AM. BS was controlled with before meal insulins and monitored with 1hr PP BS checks.

## 2023-10-23 NOTE — PROGRESS NOTES
Antepartum Progress Note    Assessment/Plan   Gabrielle David is a 38 y.o.  at 29w4d MANUEL: 2024, by Last Menstrual Period. Admitted for siPEC.    siPEC w/ SF  - Dx by severe range BPs requiring IV treatment  - IV labetalol 20/40 on admission  - HELLP labs neg x2, for twice weekly HELLP labs, will follow up this AM  - asymptomatic  - Current BP Regimen: Nifed 60 BID + Lab 400 BID (10/22)  - Anticipate admission until delivery at 34wga pending clinical status     APLS   - Continue lovenox ppx 40 mg SC daily   - Continue  mg     A2DM   - A1c 5.1%   - Pregnancy regimen: Basaglar  and Lispro 10/18/24  - Regimen increased in the setting of BMZ: Glargine  + Lispro 28  - Continue fasting and 1hr pp BG's     AMA   - Declined cfDNA  - Nml anatomy      Recurrent UTI   - Continue daily Keflex suppression     IUP  - Daily NST while inpatient, nonreactive this AM, for BPP  - Last Growth US 10/6: EFW 940g (17%), AC 20%  - For weekly BPPs  - s/p 12hrs Mg GTT for fetal neuroprotection  - GBS pending 10/21  - Prior Classical C/S, For repeat CS x 3 if headed toward delivery  - for NICU cs today     To be discussed with Dr. Adonis Kaur MD  PGY-2, Obstetrics and Gynecology  Maternal Fetal Medicine, pager: [phone]      Principal Problem:    Severe preeclampsia, third trimester  Active Problems:    Severe preeclampsia    Insomnia    History of  section, classical    High-risk pregnancy, elderly multigravida    Insulin controlled gestational diabetes mellitus (GDM) during pregnancy, antepartum    History of IUFD    Antiphospholipid antibody syndrome complicating pregnancy (CMS/HCC)    History of poor fetal growth    Multigravida of advanced maternal age in second trimester    Pregnancy Problems (from 23 to present)       Problem Noted Resolved    Preeclampsia complicating hypertension 10/20/2023 by Susy Mccormick, DO No    Priority:  Medium      Severe preeclampsia, third trimester  10/20/2023 by Santos Booth MD No    Priority:  Medium      Antiphospholipid antibody syndrome complicating pregnancy (CMS/HCC) 10/5/2023 by RYAN Hull No    Priority:  Medium      Overview Signed 10/20/2023  6:57 PM by Santos Booth MD     Currently on Lovenox 40mg daily.  bASA 162mg daily         Recurrent urinary tract infection affecting pregnancy in second trimester 10/5/2023 by RYAN Hull No    Priority:  Medium      Overview Signed 10/20/2023  6:57 PM by Santos Booth MD     On ppx Macrobid abx this pregnancy         History of poor fetal growth 10/5/2023 by RYAN Hull No    Priority:  Medium      Overview Addendum 10/20/2023  6:56 PM by Santos Booth MD     H/O FGR in prior pregnancy   Last Growth US 10/6 EFW 940g 17% AC 20%         Multigravida of advanced maternal age in second trimester 10/5/2023 by RYAN Hull No    Priority:  Medium      Overview Signed 10/20/2023  6:57 PM by Santos Booth MD     Declined cfDNA this pregnancy.         History of  section, classical 2023 by Stevo Low No    Priority:  Medium      Overview Signed 10/20/2023  6:52 PM by Santos Booth MD     For Repeat  if headed toward delivery.         High-risk pregnancy, elderly multigravida 2023 by Stevo Low No    Priority:  Medium      Insulin controlled gestational diabetes mellitus (GDM) during pregnancy, antepartum 2023 by Stevo Low No    Priority:  Medium      Overview Signed 10/20/2023  6:54 PM by Santos Booth MD     Current Regimen: Glargine , Lispro 10/18/24           Chronic hypertension in pregnancy 2023 by Stevo Low No    Priority:  Medium      History of IUFD 2023 by RYAN Hull No    Priority:  Medium      Overview Signed 10/5/2023 11:48 AM by RYAN Hull     IUFD while undergoing surveillance inpatient in first pregnancy                  Subjective   Feeling well this am. Denies ctx, LOF, VB, or decreased fetal movement. Denies HA, vision changes, CP, SOB, or RUQ pain.    Objective   Allergies:   Patient has no known allergies.    Last Vitals:  Temp Pulse Resp BP MAP Pulse Ox   36.8 °C (98.2 °F) 80 18 (!) 146/84   97 %     Vitals Min/Max Last 24 Hours:  Temp  Min: 36.5 °C (97.7 °F)  Max: 37.3 °C (99.1 °F)  Pulse  Min: 76  Max: 84  Resp  Min: 17  Max: 18  BP  Min: 135/75  Max: 164/84    Intake/Output:   No intake or output data in the 24 hours ending 10/23/23 0659      Physical Exam:  General: Well appearing, alert  HEENT: normocephalic, EOMI, clear sclera  Cardio: Warm and well perfused  Resp: breathing comfortably on room air  Abd: gravid  Neuro: grossly intact, no focal deficits  Extremities: full ROM, no calf tenderness  Psych: A&O x3, appropriate mood and affect    Fetal Assessment  FHT: 140/mod/-accel/-decel  [place]: quiet    Lab Data:  Lab Results   Component Value Date    WBC 13.1 (H) 10/22/2023    HGB 11.7 (L) 10/22/2023    HCT 35.3 (L) 10/22/2023     10/22/2023     Lab Results   Component Value Date    GLUCOSE 137 (H) 10/22/2023     10/22/2023    K 4.2 10/22/2023     10/22/2023    CO2 18 (L) 10/22/2023    ANIONGAP 16 10/22/2023    BUN 13 10/22/2023    CREATININE 0.66 10/22/2023    EGFR >90 10/22/2023    CALCIUM 8.9 10/22/2023    ALBUMIN 3.3 (L) 10/22/2023    PROT 6.2 (L) 10/22/2023    ALKPHOS 72 10/22/2023    ALT 15 10/22/2023    AST 11 10/22/2023    BILITOT 0.3 10/22/2023

## 2023-10-24 LAB
GLUCOSE BLD MANUAL STRIP-MCNC: 117 MG/DL (ref 74–99)
GLUCOSE BLD MANUAL STRIP-MCNC: 120 MG/DL (ref 74–99)
GLUCOSE BLD MANUAL STRIP-MCNC: 92 MG/DL (ref 74–99)
GLUCOSE BLD MANUAL STRIP-MCNC: 92 MG/DL (ref 74–99)

## 2023-10-24 PROCEDURE — 2500000001 HC RX 250 WO HCPCS SELF ADMINISTERED DRUGS (ALT 637 FOR MEDICARE OP): Performed by: STUDENT IN AN ORGANIZED HEALTH CARE EDUCATION/TRAINING PROGRAM

## 2023-10-24 PROCEDURE — 96372 THER/PROPH/DIAG INJ SC/IM: CPT | Performed by: OBSTETRICS & GYNECOLOGY

## 2023-10-24 PROCEDURE — 59025 FETAL NON-STRESS TEST: CPT | Mod: GC

## 2023-10-24 PROCEDURE — 59025 FETAL NON-STRESS TEST: CPT

## 2023-10-24 PROCEDURE — 99232 SBSQ HOSP IP/OBS MODERATE 35: CPT

## 2023-10-24 PROCEDURE — 2500000002 HC RX 250 W HCPCS SELF ADMINISTERED DRUGS (ALT 637 FOR MEDICARE OP, ALT 636 FOR OP/ED): Performed by: OBSTETRICS & GYNECOLOGY

## 2023-10-24 PROCEDURE — 2500000001 HC RX 250 WO HCPCS SELF ADMINISTERED DRUGS (ALT 637 FOR MEDICARE OP)

## 2023-10-24 PROCEDURE — 96372 THER/PROPH/DIAG INJ SC/IM: CPT | Performed by: STUDENT IN AN ORGANIZED HEALTH CARE EDUCATION/TRAINING PROGRAM

## 2023-10-24 PROCEDURE — 1210000001 HC SEMI-PRIVATE ROOM DAILY

## 2023-10-24 PROCEDURE — 82947 ASSAY GLUCOSE BLOOD QUANT: CPT

## 2023-10-24 PROCEDURE — 2500000004 HC RX 250 GENERAL PHARMACY W/ HCPCS (ALT 636 FOR OP/ED): Performed by: STUDENT IN AN ORGANIZED HEALTH CARE EDUCATION/TRAINING PROGRAM

## 2023-10-24 RX ADMIN — LABETALOL HYDROCHLORIDE 400 MG: 100 TABLET, FILM COATED ORAL at 09:13

## 2023-10-24 RX ADMIN — INSULIN LISPRO 22 UNITS: 100 INJECTION, SOLUTION INTRAVENOUS; SUBCUTANEOUS at 13:43

## 2023-10-24 RX ADMIN — LABETALOL HYDROCHLORIDE 400 MG: 100 TABLET, FILM COATED ORAL at 21:27

## 2023-10-24 RX ADMIN — PRENATAL VIT W/ FE FUMARATE-FA TAB 27-0.8 MG 1 TABLET: 27-0.8 TAB at 09:13

## 2023-10-24 RX ADMIN — INSULIN GLARGINE 22 UNITS: 100 INJECTION, SOLUTION SUBCUTANEOUS at 09:14

## 2023-10-24 RX ADMIN — NIFEDIPINE 60 MG: 60 TABLET, EXTENDED RELEASE ORAL at 22:09

## 2023-10-24 RX ADMIN — ENOXAPARIN SODIUM 40 MG: 100 INJECTION SUBCUTANEOUS at 09:12

## 2023-10-24 RX ADMIN — INSULIN LISPRO 12 UNITS: 100 INJECTION, SOLUTION INTRAVENOUS; SUBCUTANEOUS at 09:13

## 2023-10-24 RX ADMIN — CEPHALEXIN 250 MG: 250 CAPSULE ORAL at 09:13

## 2023-10-24 RX ADMIN — ASPIRIN 81 MG CHEWABLE TABLET 162 MG: 81 TABLET CHEWABLE at 09:13

## 2023-10-24 RX ADMIN — INSULIN GLARGINE 40 UNITS: 100 INJECTION, SOLUTION SUBCUTANEOUS at 21:26

## 2023-10-24 RX ADMIN — NIFEDIPINE 60 MG: 60 TABLET, EXTENDED RELEASE ORAL at 10:11

## 2023-10-24 RX ADMIN — Medication 1000 UNITS: at 09:13

## 2023-10-24 RX ADMIN — INSULIN LISPRO 28 UNITS: 100 INJECTION, SOLUTION INTRAVENOUS; SUBCUTANEOUS at 18:47

## 2023-10-24 ASSESSMENT — PAIN - FUNCTIONAL ASSESSMENT
PAIN_FUNCTIONAL_ASSESSMENT: 0-10
PAIN_FUNCTIONAL_ASSESSMENT: 0-10

## 2023-10-24 ASSESSMENT — PAIN SCALES - GENERAL
PAINLEVEL_OUTOF10: 0 - NO PAIN
PAINLEVEL_OUTOF10: 0 - NO PAIN

## 2023-10-24 NOTE — CONSULTS
"NICU PRENATAL CONSULT NOTE  Gabrielle David is a 38 y.o.  at 29w4d. MANUEL: 2024, by Last Menstrual Period. Estimated fetal weight: 940g. She has had prenatal care with Dr. Ham .    Chief Complaint: Severe preeclampsia    Gabrielle David is a 38 y.o.  with MANUEL 2024, by Last Menstrual Period presenting with siPEC with severe features, titrating up her BP management.  Has received betamethasone x 2 (10/20 & 10/21).   Pregnancy complicated by GDM on insulin and APLS.  Fetus has been doing well with normal growth    She has had a prior 24+ wkr 5 years ago who was in our NICU, \"[place]\".  He is doing well and while had an initial 'up and down' course and required transfusions, he was discharged 2 days before his due day.  Had a healthy 36 wkr after (no PEC) born at 36 wks for h/o classical .    Requesting Physician/Service:  Jitendra/Adonis from [facility]  Consulting Physician/Service: Glory/neonatology    Reason for Consultation: siPEC at 29 weeks    Pregnancy Complications: GDM and ALPS    Prenatal labs:   Lab Results   Component Value Date    ABO O 10/23/2023    LABRH POS 10/23/2023    ABSCRN NEG 10/23/2023    RUBIG Negative 2023     Toxicology:   No results found for: \"AMPHETAMINE\", \"MAMPHBLDS\", \"BARBITURATE\", \"BARBSCRNUR\", \"BENZODIAZ\", \"BENZO\", \"BUPRENBLDS\", \"CANNABBLDS\", \"CANNABINOID\", \"COCBLDS\", \"COCAI\", \"METHABLDS\", \"METH\", \"OXYBLDS\", \"OXYCODONE\", \"PCPBLDS\", \"PCP\", \"OPIATBLDS\", \"OPIATE\", \"FENTANYL\", \"DRBLDCOMM\"  Labs:  Lab Results   Component Value Date    GRPBSTREP No Group B Streptococcus (GBS) isolated 10/20/2023    HIV1X2 NONREACTIVE 2023    HEPBSAG NONREACTIVE 2023    HEPCAB NONREACTIVE 2023    NEISSGONOAMP NEGATIVE 2023    CHLAMTRACAMP NEGATIVE 2023    SYPHT Nonreactive 10/10/2023     Fetal Imaging:  === Results for orders placed in visit on 10/06/23 ===    US OB follow UP transabdominal approach [[id number]] 10/06/2023    Status: Normal    Follow up " ultrasound performed for growth due to poor obstetric history with periviable demise due ot PEC, APLS, T2DM, and cHTN on nifedipine.     -Normal interval fetal growth from the last ultrasound.  -No malformations were identified on a limited survey  - There is a left, lateral fibroid (measurements below).  Est fetal weight 940g at 27 1/7 weeks.    Medical History  She has a past medical history of Abnormal biochemical finding on  screening of mother (2017), Antiphospholipid syndrome during pregnancy (CMS/[facility]), Chronic hypertension affecting pregnancy, Encounter for supervision of normal pregnancy, unspecified, first trimester (2017), Encounter for supervision of normal pregnancy, unspecified, first trimester (2018), Gestational (pregnancy-induced) hypertension without significant proteinuria, second trimester (2018), Maternal care for other (suspected) fetal abnormality and damage, fetal cardiac anomalies, not applicable or unspecified (2017), Maternal care for other known or suspected poor fetal growth, unspecified trimester, not applicable or unspecified (2018), Obesity complicating pregnancy, unspecified trimester (2018), Other conditions influencing health status (2017), Other conditions influencing health status (2017), Personal history of other complications of pregnancy, childbirth and the puerperium (2018), Personal history of other diseases of the nervous system and sense organs (2017), Personal history of other diseases of urinary system (2017), Pre-existing essential hypertension complicating pregnancy, unspecified trimester (2018), Premature separation of placenta, unspecified, second trimester (2018), Type 2 diabetes mellitus affecting pregnancy, antepartum, and Urinary tract infection.     Family History  Family History   Problem Relation Name Age of Onset    Diabetes Mother      Hypertension Mother       Diabetes Father      Other (Essential hypertension) Sister          Social History  She reports that she has never smoked. She has never used smokeless tobacco. She reports that she does not currently use alcohol. She reports that she does not use drugs.      Assessment/Recommendations:  Gabrielle is a 39 yo G 4 P 0302 currently at 29 4/7 weeks gestation.  She presented with si PEC with severe features.  Increasing antihypertensive medications daily.  Beta complete.  She has a good understanding of the NICU and the course experienced by her 6 yo.  We discussed that this NICU stay (now to 34 weeks) would hopefully be different and shorter.  We discussed the below:    1. I counseled the family regarding the uncertainty of gestational age dating and prognosis.  I discussed the predicted survival (>90%) of an infant at 29 weeks gestation and that it only improves from there.  I explained resuscitation teams’ presence at delivery and our plan of care.    2. Discussed possible short term morbidities including:   RDS, possible surfactant via BRISEIDA vs. intubation. Likely anticipate only needing CPAP support.   IVH - lower risk as gestation progresses.  Unsure if her 24 wkr had IVH.  Discussed when we would obtain head US (timing and GA)   Hyperbilirubinemia        Apnea of prematurity        Feeding difficulties pending GA and NG supplementation and plans for breast feeding.   Duration of hospitalization and milestones needed to be met prior to discharge.    3. I strongly encouraged the mother to provide breast milk to the infant which she is planning.  She is aware of our lactation supports and that we would work on breast feeding when showing oral readiness.    4.         Discussed with parents plan may need to be modified after the baby has been born and examined.    Right now she is feeling well emotionally.  She has been through this before and feels she know what to expect.  Her 4yo is doing well.     Patient made aware that  Neonatology will be present for delivery and that we remain available for any questions/concerns that might arise. Thank you.     Electronically signed by:  Aparna Holder MD     I spent 40 minutes for the consult with at least half the time being face to face with the patient.

## 2023-10-24 NOTE — PROGRESS NOTES
Antepartum Progress Note    Assessment/Plan   Gabrielle David is a 38 y.o.  at 29w5d MANUEL: 2024, by Last Menstrual Period. Admitted for siPEC.    siPEC w/ SF  - Dx by severe range BPs requiring IV treatment  - IV labetalol 20/40 on admission  - HELLP labs neg x2, for twice weekly HELLP labs, will follow up this AM  - asymptomatic  - Current BP Regimen: Nifed 60 BID + Lab 400 BID (10/22)  - Anticipate admission until delivery at 34wga pending clinical status     APLS   - Continue lovenox ppx 40 mg SC daily   - Continue  mg     A2DM   - A1c 5.1%   - Pregnancy regimen: Basaglar  and Lispro 10/18/24  - Regimen increased in the setting of BMZ: Glargine  + Lispro 28  - Continue fasting and 1hr pp BG's, BG well controlled     AMA   - Declined cfDNA  - Nml anatomy      Recurrent UTI   - Continue daily Keflex suppression     IUP  - Daily NST while inpatient, AGA this AM  - Last Growth US 10/6: EFW 940g (17%), AC 20%  - continue weekly BPPs  - s/p 12hrs Mg GTT for fetal neuroprotection  - GBS POS 10/21  - Prior Classical C/S, For repeat CS x 3 if headed toward delivery  - s/p NICU cs      To be discussed with Dr. Adonis Kaur MD  PGY-2, Obstetrics and Gynecology  Maternal Fetal Medicine, pager: [phone]      Principal Problem:    Severe preeclampsia, third trimester  Active Problems:    Severe preeclampsia    Insomnia    History of  section, classical    High-risk pregnancy, elderly multigravida    Insulin controlled gestational diabetes mellitus (GDM) during pregnancy, antepartum    History of IUFD    Antiphospholipid antibody syndrome complicating pregnancy (CMS/HCC)    History of poor fetal growth    Multigravida of advanced maternal age in second trimester    Pregnancy Problems (from 23 to present)       Problem Noted Resolved    Preeclampsia complicating hypertension 10/20/2023 by Susy Mccormick, DO No    Priority:  Medium      Severe preeclampsia, third trimester  10/20/2023 by Santos Booth MD No    Priority:  Medium      Antiphospholipid antibody syndrome complicating pregnancy (CMS/HCC) 10/5/2023 by RYAN Hull No    Priority:  Medium      Overview Signed 10/20/2023  6:57 PM by Santos Booth MD     Currently on Lovenox 40mg daily.  bASA 162mg daily         Recurrent urinary tract infection affecting pregnancy in second trimester 10/5/2023 by RYAN Hull No    Priority:  Medium      Overview Signed 10/20/2023  6:57 PM by Santos Booth MD     On ppx Macrobid abx this pregnancy         History of poor fetal growth 10/5/2023 by RYAN Hull No    Priority:  Medium      Overview Addendum 10/20/2023  6:56 PM by Santos Booth MD     H/O FGR in prior pregnancy   Last Growth US 10/6 EFW 940g 17% AC 20%         Multigravida of advanced maternal age in second trimester 10/5/2023 by RYAN Hull No    Priority:  Medium      Overview Signed 10/20/2023  6:57 PM by Santos Booth MD     Declined cfDNA this pregnancy.         History of  section, classical 2023 by Stevo Low No    Priority:  Medium      Overview Signed 10/20/2023  6:52 PM by Santos Booth MD     For Repeat  if headed toward delivery.         High-risk pregnancy, elderly multigravida 2023 by Stevo Low No    Priority:  Medium      Insulin controlled gestational diabetes mellitus (GDM) during pregnancy, antepartum 2023 by Stevo Low No    Priority:  Medium      Overview Signed 10/20/2023  6:54 PM by Santos Booth MD     Current Regimen: Glargine , Lispro 10/18/24           Chronic hypertension in pregnancy 2023 by Stevo Low No    Priority:  Medium      History of IUFD 2023 by RYAN Hull No    Priority:  Medium      Overview Signed 10/5/2023 11:48 AM by RYAN Hull     IUFD while undergoing surveillance inpatient in first pregnancy                  Subjective   Feeling well this am. Denies ctx, LOF, VB, or decreased fetal movement. Denies HA, vision changes, CP, SOB, or RUQ pain.    Objective   Allergies:   Patient has no known allergies.    Last Vitals:  Temp Pulse Resp BP MAP Pulse Ox   36.6 °C (97.9 °F) 74 18 137/88   97 %     Vitals Min/Max Last 24 Hours:  Temp  Min: 36.3 °C (97.3 °F)  Max: 37.2 °C (99 °F)  Pulse  Min: 74  Max: 85  Resp  Min: 17  Max: 19  BP  Min: 126/73  Max: 154/81    Intake/Output:   No intake or output data in the 24 hours ending 10/24/23 0703      Physical Exam:  General: Well appearing, alert  HEENT: normocephalic, EOMI, clear sclera  Cardio: Warm and well perfused  Resp: breathing comfortably on room air  Abd: gravid  Neuro: grossly intact, no focal deficits  Extremities: full ROM, no calf tenderness  Psych: A&O x3, appropriate mood and affect    Fetal Assessment  FHT: 135/mod/+accel/-decel  [place]: quiet    Lab Data:  Lab Results   Component Value Date    WBC 9.8 10/23/2023    HGB 11.8 (L) 10/23/2023    HCT 36.8 10/23/2023     10/23/2023     Lab Results   Component Value Date    GLUCOSE 71 (L) 10/23/2023     10/23/2023    K 5.1 10/23/2023     10/23/2023    CO2 25 10/23/2023    ANIONGAP 14 10/23/2023    BUN 13 10/23/2023    CREATININE 0.76 10/23/2023    EGFR >90 10/23/2023    CALCIUM 9.2 10/23/2023    ALBUMIN 3.3 (L) 10/23/2023    PROT 6.3 (L) 10/23/2023    ALKPHOS 71 10/23/2023    ALT 16 10/23/2023    AST 12 10/23/2023    BILITOT 0.3 10/23/2023

## 2023-10-24 NOTE — CARE PLAN
The patient's goals for the shift include follow med plan    The clinical goals for the shift include BP and glucose WNL

## 2023-10-25 ENCOUNTER — APPOINTMENT (OUTPATIENT)
Dept: RADIOLOGY | Facility: HOSPITAL | Age: 38
End: 2023-10-25
Payer: OTHER GOVERNMENT

## 2023-10-25 LAB
GLUCOSE BLD MANUAL STRIP-MCNC: 105 MG/DL (ref 74–99)
GLUCOSE BLD MANUAL STRIP-MCNC: 132 MG/DL (ref 74–99)
GLUCOSE BLD MANUAL STRIP-MCNC: 143 MG/DL (ref 74–99)
GLUCOSE BLD MANUAL STRIP-MCNC: 84 MG/DL (ref 74–99)

## 2023-10-25 PROCEDURE — 76820 UMBILICAL ARTERY ECHO: CPT

## 2023-10-25 PROCEDURE — 76818 FETAL BIOPHYS PROFILE W/NST: CPT

## 2023-10-25 PROCEDURE — 99232 SBSQ HOSP IP/OBS MODERATE 35: CPT

## 2023-10-25 PROCEDURE — 76816 OB US FOLLOW-UP PER FETUS: CPT | Performed by: OBSTETRICS & GYNECOLOGY

## 2023-10-25 PROCEDURE — 76816 OB US FOLLOW-UP PER FETUS: CPT

## 2023-10-25 PROCEDURE — 96372 THER/PROPH/DIAG INJ SC/IM: CPT | Performed by: STUDENT IN AN ORGANIZED HEALTH CARE EDUCATION/TRAINING PROGRAM

## 2023-10-25 PROCEDURE — 59025 FETAL NON-STRESS TEST: CPT | Mod: GC

## 2023-10-25 PROCEDURE — 2500000002 HC RX 250 W HCPCS SELF ADMINISTERED DRUGS (ALT 637 FOR MEDICARE OP, ALT 636 FOR OP/ED): Performed by: OBSTETRICS & GYNECOLOGY

## 2023-10-25 PROCEDURE — 2500000001 HC RX 250 WO HCPCS SELF ADMINISTERED DRUGS (ALT 637 FOR MEDICARE OP)

## 2023-10-25 PROCEDURE — 76819 FETAL BIOPHYS PROFIL W/O NST: CPT | Performed by: OBSTETRICS & GYNECOLOGY

## 2023-10-25 PROCEDURE — 1210000001 HC SEMI-PRIVATE ROOM DAILY

## 2023-10-25 PROCEDURE — 2500000001 HC RX 250 WO HCPCS SELF ADMINISTERED DRUGS (ALT 637 FOR MEDICARE OP): Performed by: STUDENT IN AN ORGANIZED HEALTH CARE EDUCATION/TRAINING PROGRAM

## 2023-10-25 PROCEDURE — 2500000004 HC RX 250 GENERAL PHARMACY W/ HCPCS (ALT 636 FOR OP/ED): Performed by: STUDENT IN AN ORGANIZED HEALTH CARE EDUCATION/TRAINING PROGRAM

## 2023-10-25 PROCEDURE — 82947 ASSAY GLUCOSE BLOOD QUANT: CPT

## 2023-10-25 PROCEDURE — 76820 UMBILICAL ARTERY ECHO: CPT | Performed by: OBSTETRICS & GYNECOLOGY

## 2023-10-25 PROCEDURE — 59025 FETAL NON-STRESS TEST: CPT

## 2023-10-25 PROCEDURE — 96372 THER/PROPH/DIAG INJ SC/IM: CPT | Performed by: OBSTETRICS & GYNECOLOGY

## 2023-10-25 RX ADMIN — INSULIN GLARGINE 22 UNITS: 100 INJECTION, SOLUTION SUBCUTANEOUS at 06:59

## 2023-10-25 RX ADMIN — INSULIN LISPRO 12 UNITS: 100 INJECTION, SOLUTION INTRAVENOUS; SUBCUTANEOUS at 09:01

## 2023-10-25 RX ADMIN — LABETALOL HYDROCHLORIDE 400 MG: 100 TABLET, FILM COATED ORAL at 20:58

## 2023-10-25 RX ADMIN — Medication 1000 UNITS: at 08:47

## 2023-10-25 RX ADMIN — NIFEDIPINE 60 MG: 60 TABLET, EXTENDED RELEASE ORAL at 10:21

## 2023-10-25 RX ADMIN — LABETALOL HYDROCHLORIDE 400 MG: 100 TABLET, FILM COATED ORAL at 08:47

## 2023-10-25 RX ADMIN — INSULIN LISPRO 28 UNITS: 100 INJECTION, SOLUTION INTRAVENOUS; SUBCUTANEOUS at 18:33

## 2023-10-25 RX ADMIN — INSULIN LISPRO 22 UNITS: 100 INJECTION, SOLUTION INTRAVENOUS; SUBCUTANEOUS at 12:14

## 2023-10-25 RX ADMIN — CEPHALEXIN 250 MG: 250 CAPSULE ORAL at 08:47

## 2023-10-25 RX ADMIN — NIFEDIPINE 60 MG: 60 TABLET, EXTENDED RELEASE ORAL at 22:24

## 2023-10-25 RX ADMIN — ENOXAPARIN SODIUM 40 MG: 100 INJECTION SUBCUTANEOUS at 08:47

## 2023-10-25 RX ADMIN — ASPIRIN 81 MG CHEWABLE TABLET 162 MG: 81 TABLET CHEWABLE at 09:01

## 2023-10-25 RX ADMIN — INSULIN GLARGINE 40 UNITS: 100 INJECTION, SOLUTION SUBCUTANEOUS at 20:56

## 2023-10-25 RX ADMIN — PRENATAL VIT W/ FE FUMARATE-FA TAB 27-0.8 MG 1 TABLET: 27-0.8 TAB at 08:46

## 2023-10-25 ASSESSMENT — PAIN SCALES - GENERAL
PAINLEVEL_OUTOF10: 0 - NO PAIN

## 2023-10-25 ASSESSMENT — PAIN - FUNCTIONAL ASSESSMENT
PAIN_FUNCTIONAL_ASSESSMENT: 0-10

## 2023-10-25 NOTE — CARE PLAN
The patient's goals for the shift include normal blood pressures    The clinical goals for the shift include BP remain WNL      Problem: Diabetes  Goal: Achieve decreasing blood glucose levels by end of shift  Outcome: Progressing  Goal: Increase stability of blood glucose readings by end of shift  Outcome: Progressing  Goal: Decrease in ketones present in urine by end of shift  Outcome: Progressing  Goal: Maintain electrolyte levels within acceptable range throughout shift  Outcome: Progressing  Goal: Maintain glucose levels >70mg/dl to <250mg/dl throughout shift  Outcome: Progressing  Goal: No changes in neurological exam by end of shift  Outcome: Progressing  Goal: Learn about and adhere to nutrition recommendations by end of shift  Outcome: Progressing  Goal: Vital signs within normal range for age by end of shift  Outcome: Progressing  Goal: Increase self care and/or family involovement by end of shift  Outcome: Progressing  Goal: Receive DSME education by end of shift  Outcome: Progressing     Problem: Hypertensive Disorder of Pregnancy (HDP)  Goal: Minimal s/sx of HDP and BP<160/110  Outcome: Progressing

## 2023-10-25 NOTE — CARE PLAN
The patient's goals for the shift include BP within normal range    The clinical goals for the shift include BP <160/110 with medication and free from s/s PEC    Ms. David had stable vital signs (mild range bp) and assessments, pain well controlled and fetal heart rate reassuring this shift.

## 2023-10-25 NOTE — CARE PLAN
The patient's goals for the shift include normal blood pressures    The clinical goals for the shift include BP remain WNL    Over the shift, the patient did not make progress toward the following goals achieving decreasing blood glucose levels by end of shift and increase stability of blood glucose reading by end of shift. Recommendations to address these barriers include dieteary modifications and current insulin regimen.    Corinne Brown, RN

## 2023-10-25 NOTE — PROGRESS NOTES
Antepartum Progress Note    Assessment/Plan   Gabrielle David is a 38 y.o.  at 29w6d MANUEL: 2024, by Last Menstrual Period. Admitted for siPEC.    siPEC w/ SF  - Dx by severe range BPs requiring IV treatment  - IV labetalol 20/40 on admission  - HELLP labs neg over multiple sets, continue twice weekly labs  - asymptomatic  - Current BP Regimen: Nifed 60 BID + Lab 400 BID (10/22)  - Anticipate admission until delivery at 34wga pending clinical status     APLS   - Continue lovenox ppx 40 mg SC daily   - Continue  mg     A2DM   - A1c 5.1%   - Pregnancy regimen: Basaglar  and Lispro 10/18/24  - Regimen increased in the setting of BMZ: Glargine  + Lispro 28  - Continue fasting and 1hr pp Bgs  - can consider decreasing regimen now out of BMZ window, however currently well controlled, will continue     AMA   - Declined cfDNA  - Nml anatomy      Recurrent UTI   - Continue daily Keflex suppression     IUP  - Daily NST while inpatient, AGA this AM  - s/p Tdap  - Last Growth  10/6: EFW 940g (17%), AC 20%, for growth today  - continue weekly BPPs  - s/p 12hrs Mg GTT for fetal neuroprotection  - GBS POS 10/21  - Prior Classical C/S, For repeat CS x 3 if headed toward delivery  - s/p NICU cs      To be discussed with Dr. Adonis Kaur MD  PGY-2, Obstetrics and Gynecology  Maternal Fetal Medicine, pager: [phone]      Principal Problem:    Severe preeclampsia, third trimester  Active Problems:    Severe preeclampsia    Insomnia    History of  section, classical    High-risk pregnancy, elderly multigravida    Insulin controlled gestational diabetes mellitus (GDM) during pregnancy, antepartum    History of IUFD    Antiphospholipid antibody syndrome complicating pregnancy (CMS/HCC)    History of poor fetal growth    Multigravida of advanced maternal age in second trimester    Pregnancy Problems (from 23 to present)       Problem Noted Resolved    Preeclampsia complicating  hypertension 10/20/2023 by Susy Mccormick DO No    Priority:  Medium      Severe preeclampsia, third trimester 10/20/2023 by Santos Booth MD No    Priority:  Medium      Antiphospholipid antibody syndrome complicating pregnancy (CMS/HCC) 10/5/2023 by RYAN Hull No    Priority:  Medium      Overview Signed 10/20/2023  6:57 PM by Santos Booth MD     Currently on Lovenox 40mg daily.  bASA 162mg daily         Recurrent urinary tract infection affecting pregnancy in second trimester 10/5/2023 by RYAN Hull No    Priority:  Medium      Overview Signed 10/20/2023  6:57 PM by Santos Booth MD     On ppx Macrobid abx this pregnancy         History of poor fetal growth 10/5/2023 by RYAN Hull No    Priority:  Medium      Overview Addendum 10/20/2023  6:56 PM by Santos Booth MD     H/O FGR in prior pregnancy   Last Growth US 10/6 EFW 940g 17% AC 20%         Multigravida of advanced maternal age in second trimester 10/5/2023 by PRINCE Hull-CNP No    Priority:  Medium      Overview Signed 10/20/2023  6:57 PM by Santos Booth MD     Declined cfDNA this pregnancy.         History of  section, classical 2023 by Stevo Low No    Priority:  Medium      Overview Signed 10/20/2023  6:52 PM by Santos Booth MD     For Repeat  if headed toward delivery.         High-risk pregnancy, elderly multigravida 2023 by Stevo Low No    Priority:  Medium      Insulin controlled gestational diabetes mellitus (GDM) during pregnancy, antepartum 2023 by Stevo Low No    Priority:  Medium      Overview Signed 10/20/2023  6:54 PM by Santos Booth MD     Current Regimen: Glargine , Lispro 10/18/24           Chronic hypertension in pregnancy 2023 by Stevo Low No    Priority:  Medium      History of IUFD 2023 by RYAN Hull No    Priority:  Medium      Overview Signed 10/5/2023 11:48 AM  by Mariza Bell, PRINCE-CNP     IUFD while undergoing surveillance inpatient in first pregnancy                 Subjective   Feeling well this am. Denies ctx, LOF, VB, or decreased fetal movement. Denies HA, vision changes, CP, SOB, or RUQ pain.    Objective   Allergies:   Patient has no known allergies.    Last Vitals:  Temp Pulse Resp BP MAP Pulse Ox   37.4 °C (99.3 °F) 77 17 115/69   (!) 94 %     Vitals Min/Max Last 24 Hours:  Temp  Min: 36.6 °C (97.9 °F)  Max: 37.4 °C (99.3 °F)  Pulse  Min: 66  Max: 81  Resp  Min: 16  Max: 18  BP  Min: 115/69  Max: 135/85    Intake/Output:   No intake or output data in the 24 hours ending 10/25/23 0712      Physical Exam:  General: Well appearing, alert  HEENT: normocephalic, EOMI, clear sclera  Cardio: Warm and well perfused  Resp: breathing comfortably on room air  Abd: gravid  Neuro: grossly intact, no focal deficits  Extremities: full ROM, no calf tenderness  Psych: A&O x3, appropriate mood and affect    Fetal Assessment  FHT: 135/mod/+accel/-decel  [place]: quiet    Lab Data:  Lab Results   Component Value Date    WBC 9.8 10/23/2023    HGB 11.8 (L) 10/23/2023    HCT 36.8 10/23/2023     10/23/2023     Lab Results   Component Value Date    GLUCOSE 71 (L) 10/23/2023     10/23/2023    K 5.1 10/23/2023     10/23/2023    CO2 25 10/23/2023    ANIONGAP 14 10/23/2023    BUN 13 10/23/2023    CREATININE 0.76 10/23/2023    EGFR >90 10/23/2023    CALCIUM 9.2 10/23/2023    ALBUMIN 3.3 (L) 10/23/2023    PROT 6.3 (L) 10/23/2023    ALKPHOS 71 10/23/2023    ALT 16 10/23/2023    AST 12 10/23/2023    BILITOT 0.3 10/23/2023

## 2023-10-26 LAB
ABO GROUP (TYPE) IN BLOOD: NORMAL
ALBUMIN SERPL BCP-MCNC: 3.4 G/DL (ref 3.4–5)
ALP SERPL-CCNC: 75 U/L (ref 33–110)
ALT SERPL W P-5'-P-CCNC: 10 U/L (ref 7–45)
ANION GAP SERPL CALC-SCNC: 13 MMOL/L (ref 10–20)
ANTIBODY SCREEN: NORMAL
AST SERPL W P-5'-P-CCNC: 12 U/L (ref 9–39)
BILIRUB SERPL-MCNC: 0.4 MG/DL (ref 0–1.2)
BUN SERPL-MCNC: 12 MG/DL (ref 6–23)
CALCIUM SERPL-MCNC: 8.8 MG/DL (ref 8.6–10.6)
CHLORIDE SERPL-SCNC: 104 MMOL/L (ref 98–107)
CO2 SERPL-SCNC: 23 MMOL/L (ref 21–32)
CREAT SERPL-MCNC: 0.76 MG/DL (ref 0.5–1.05)
ERYTHROCYTE [DISTWIDTH] IN BLOOD BY AUTOMATED COUNT: 14.6 % (ref 11.5–14.5)
GFR SERPL CREATININE-BSD FRML MDRD: >90 ML/MIN/1.73M*2
GLUCOSE BLD MANUAL STRIP-MCNC: 119 MG/DL (ref 74–99)
GLUCOSE BLD MANUAL STRIP-MCNC: 70 MG/DL (ref 74–99)
GLUCOSE BLD MANUAL STRIP-MCNC: 71 MG/DL (ref 74–99)
GLUCOSE BLD MANUAL STRIP-MCNC: 99 MG/DL (ref 74–99)
GLUCOSE SERPL-MCNC: 81 MG/DL (ref 74–99)
HCT VFR BLD AUTO: 35.9 % (ref 36–46)
HGB BLD-MCNC: 11.8 G/DL (ref 12–16)
MCH RBC QN AUTO: 31.3 PG (ref 26–34)
MCHC RBC AUTO-ENTMCNC: 32.9 G/DL (ref 32–36)
MCV RBC AUTO: 95 FL (ref 80–100)
NRBC BLD-RTO: 0 /100 WBCS (ref 0–0)
PLATELET # BLD AUTO: 299 X10*3/UL (ref 150–450)
PMV BLD AUTO: 10.2 FL (ref 7.5–11.5)
POTASSIUM SERPL-SCNC: 3.7 MMOL/L (ref 3.5–5.3)
PROT SERPL-MCNC: 6.4 G/DL (ref 6.4–8.2)
RBC # BLD AUTO: 3.77 X10*6/UL (ref 4–5.2)
RH FACTOR (ANTIGEN D): NORMAL
SODIUM SERPL-SCNC: 136 MMOL/L (ref 136–145)
WBC # BLD AUTO: 8.5 X10*3/UL (ref 4.4–11.3)

## 2023-10-26 PROCEDURE — 59025 FETAL NON-STRESS TEST: CPT

## 2023-10-26 PROCEDURE — 2500000002 HC RX 250 W HCPCS SELF ADMINISTERED DRUGS (ALT 637 FOR MEDICARE OP, ALT 636 FOR OP/ED): Performed by: STUDENT IN AN ORGANIZED HEALTH CARE EDUCATION/TRAINING PROGRAM

## 2023-10-26 PROCEDURE — 86901 BLOOD TYPING SEROLOGIC RH(D): CPT

## 2023-10-26 PROCEDURE — 96372 THER/PROPH/DIAG INJ SC/IM: CPT | Performed by: STUDENT IN AN ORGANIZED HEALTH CARE EDUCATION/TRAINING PROGRAM

## 2023-10-26 PROCEDURE — 2500000001 HC RX 250 WO HCPCS SELF ADMINISTERED DRUGS (ALT 637 FOR MEDICARE OP)

## 2023-10-26 PROCEDURE — 36415 COLL VENOUS BLD VENIPUNCTURE: CPT

## 2023-10-26 PROCEDURE — 2500000004 HC RX 250 GENERAL PHARMACY W/ HCPCS (ALT 636 FOR OP/ED): Performed by: STUDENT IN AN ORGANIZED HEALTH CARE EDUCATION/TRAINING PROGRAM

## 2023-10-26 PROCEDURE — 99232 SBSQ HOSP IP/OBS MODERATE 35: CPT

## 2023-10-26 PROCEDURE — 36415 COLL VENOUS BLD VENIPUNCTURE: CPT | Performed by: STUDENT IN AN ORGANIZED HEALTH CARE EDUCATION/TRAINING PROGRAM

## 2023-10-26 PROCEDURE — 1210000001 HC SEMI-PRIVATE ROOM DAILY

## 2023-10-26 PROCEDURE — 85027 COMPLETE CBC AUTOMATED: CPT | Performed by: STUDENT IN AN ORGANIZED HEALTH CARE EDUCATION/TRAINING PROGRAM

## 2023-10-26 PROCEDURE — 80053 COMPREHEN METABOLIC PANEL: CPT | Performed by: STUDENT IN AN ORGANIZED HEALTH CARE EDUCATION/TRAINING PROGRAM

## 2023-10-26 PROCEDURE — 2500000001 HC RX 250 WO HCPCS SELF ADMINISTERED DRUGS (ALT 637 FOR MEDICARE OP): Performed by: STUDENT IN AN ORGANIZED HEALTH CARE EDUCATION/TRAINING PROGRAM

## 2023-10-26 PROCEDURE — 82947 ASSAY GLUCOSE BLOOD QUANT: CPT

## 2023-10-26 PROCEDURE — 59025 FETAL NON-STRESS TEST: CPT | Mod: GC

## 2023-10-26 RX ORDER — INSULIN GLARGINE 100 [IU]/ML
36 INJECTION, SOLUTION SUBCUTANEOUS NIGHTLY
Status: DISCONTINUED | OUTPATIENT
Start: 2023-10-26 | End: 2023-10-30

## 2023-10-26 RX ADMIN — INSULIN GLARGINE 22 UNITS: 100 INJECTION, SOLUTION SUBCUTANEOUS at 07:14

## 2023-10-26 RX ADMIN — PRENATAL VIT W/ FE FUMARATE-FA TAB 27-0.8 MG 1 TABLET: 27-0.8 TAB at 09:14

## 2023-10-26 RX ADMIN — CEPHALEXIN 250 MG: 250 CAPSULE ORAL at 10:10

## 2023-10-26 RX ADMIN — Medication 1000 UNITS: at 09:13

## 2023-10-26 RX ADMIN — NIFEDIPINE 60 MG: 60 TABLET, EXTENDED RELEASE ORAL at 09:14

## 2023-10-26 RX ADMIN — LABETALOL HYDROCHLORIDE 400 MG: 100 TABLET, FILM COATED ORAL at 21:25

## 2023-10-26 RX ADMIN — INSULIN LISPRO 12 UNITS: 100 INJECTION, SOLUTION INTRAVENOUS; SUBCUTANEOUS at 09:10

## 2023-10-26 RX ADMIN — ASPIRIN 81 MG CHEWABLE TABLET 162 MG: 81 TABLET CHEWABLE at 09:12

## 2023-10-26 RX ADMIN — INSULIN LISPRO 28 UNITS: 100 INJECTION, SOLUTION INTRAVENOUS; SUBCUTANEOUS at 18:34

## 2023-10-26 RX ADMIN — LABETALOL HYDROCHLORIDE 400 MG: 100 TABLET, FILM COATED ORAL at 09:14

## 2023-10-26 RX ADMIN — NIFEDIPINE 60 MG: 60 TABLET, EXTENDED RELEASE ORAL at 21:25

## 2023-10-26 RX ADMIN — INSULIN LISPRO 22 UNITS: 100 INJECTION, SOLUTION INTRAVENOUS; SUBCUTANEOUS at 13:15

## 2023-10-26 RX ADMIN — ENOXAPARIN SODIUM 40 MG: 100 INJECTION SUBCUTANEOUS at 09:13

## 2023-10-26 RX ADMIN — INSULIN GLARGINE 36 UNITS: 100 INJECTION, SOLUTION SUBCUTANEOUS at 21:27

## 2023-10-26 ASSESSMENT — PAIN SCALES - GENERAL
PAINLEVEL_OUTOF10: 0 - NO PAIN

## 2023-10-26 ASSESSMENT — PAIN - FUNCTIONAL ASSESSMENT
PAIN_FUNCTIONAL_ASSESSMENT: 0-10

## 2023-10-26 NOTE — PROGRESS NOTES
Antepartum Progress Note    Assessment/Plan   Gabrielle David is a 38 y.o.  at 30w0d MANUEL: 2024, by Last Menstrual Period. Admitted for siPEC.    siPEC w/ SF  - Dx by severe range BPs requiring IV treatment  - IV labetalol 20/40 on admission  - HELLP labs neg over multiple sets, continue twice weekly labs  - asymptomatic  - Current BP Regimen: Nifed 60 BID + Lab 400 BID (10/22)  - Anticipate admission until delivery at 34wga pending clinical status    New FGR   - Last growth 10/25 EFW 1264g (9%), AC 13%  - Elevated UA dopplers   - for twice weekly BPP/dopplers, next 10/27     APLS   - Continue lovenox ppx 40 mg SC daily   - Continue  mg     A2DM   - A1c 5.1%   - Pregnancy regimen: Basaglar  and Lispro 10/18/24  - Regimen increased in the setting of BMZ: Glargine  + Lispro 28 -> PM glargine decreased to 36U at bedtime in the s/o low fasting BG this AM  - Continue fasting and 1hr pp Bgs     AMA   - Declined cfDNA  - Nml anatomy      Recurrent UTI   - Continue daily Keflex suppression     IUP  - Daily NST while inpatient, AGA this AM  - s/p Tdap  - s/p 12hrs Mg GTT for fetal neuroprotection  - GBS POS 10/21  - Prior Classical C/S, For repeat CS x 3 if headed toward delivery  - s/p NICU cs      To be discussed with Dr. Adonis Kaur MD  PGY-2, Obstetrics and Gynecology  Maternal Fetal Medicine, pager: [phone]      Principal Problem:    Severe preeclampsia, third trimester  Active Problems:    Severe preeclampsia    Insomnia    History of  section, classical    High-risk pregnancy, elderly multigravida    Insulin controlled gestational diabetes mellitus (GDM) during pregnancy, antepartum    History of IUFD    Antiphospholipid antibody syndrome complicating pregnancy (CMS/HCC)    History of poor fetal growth    Multigravida of advanced maternal age in second trimester    Pregnancy Problems (from 23 to present)       Problem Noted Resolved    Preeclampsia complicating  hypertension 10/20/2023 by Susy Mccormick DO No    Priority:  Medium      Severe preeclampsia, third trimester 10/20/2023 by Santos Booth MD No    Priority:  Medium      Antiphospholipid antibody syndrome complicating pregnancy (CMS/HCC) 10/5/2023 by RYAN Hull No    Priority:  Medium      Overview Signed 10/20/2023  6:57 PM by Santos Booth MD     Currently on Lovenox 40mg daily.  bASA 162mg daily         Recurrent urinary tract infection affecting pregnancy in second trimester 10/5/2023 by RYAN Hull No    Priority:  Medium      Overview Signed 10/20/2023  6:57 PM by Santos Booth MD     On ppx Macrobid abx this pregnancy         History of poor fetal growth 10/5/2023 by RYAN Hull No    Priority:  Medium      Overview Addendum 10/20/2023  6:56 PM by Santos Booth MD     H/O FGR in prior pregnancy   Last Growth US 10/6 EFW 940g 17% AC 20%         Multigravida of advanced maternal age in second trimester 10/5/2023 by PRINCE Hull-CNP No    Priority:  Medium      Overview Signed 10/20/2023  6:57 PM by Santos Booth MD     Declined cfDNA this pregnancy.         History of  section, classical 2023 by Stevo Low No    Priority:  Medium      Overview Signed 10/20/2023  6:52 PM by Santos Booth MD     For Repeat  if headed toward delivery.         High-risk pregnancy, elderly multigravida 2023 by Stevo Low No    Priority:  Medium      Insulin controlled gestational diabetes mellitus (GDM) during pregnancy, antepartum 2023 by Stevo Low No    Priority:  Medium      Overview Signed 10/20/2023  6:54 PM by Santos Booth MD     Current Regimen: Glargine , Lispro 10/18/24           Chronic hypertension in pregnancy 2023 by Stevo Low No    Priority:  Medium      History of IUFD 2023 by RYAN Hull No    Priority:  Medium      Overview Signed 10/5/2023 11:48 AM  by Mariza Bell, PRINCE-CNP     IUFD while undergoing surveillance inpatient in first pregnancy                 Subjective   Feeling well this am. Denies ctx, LOF, VB, or decreased fetal movement. Denies HA, vision changes, CP, SOB, or RUQ pain.    Objective   Allergies:   Patient has no known allergies.    Last Vitals:  Temp Pulse Resp BP MAP Pulse Ox   36.4 °C (97.5 °F) 71 16 130/76   96 %     Vitals Min/Max Last 24 Hours:  Temp  Min: 36.4 °C (97.5 °F)  Max: 37.4 °C (99.3 °F)  Pulse  Min: 71  Max: 84  Resp  Min: 16  Max: 20  BP  Min: 120/69  Max: 143/83    Intake/Output:   No intake or output data in the 24 hours ending 10/26/23 0641      Physical Exam:  General: Well appearing, alert  HEENT: normocephalic, EOMI, clear sclera  Cardio: Warm and well perfused  Resp: breathing comfortably on room air  Abd: gravid  Neuro: grossly intact, no focal deficits  Extremities: full ROM, no calf tenderness  Psych: A&O x3, appropriate mood and affect    Fetal Assessment  FHT: 135/mod/+accel/-decel  [place]: quiet    Lab Data:  Lab Results   Component Value Date    WBC 9.8 10/23/2023    HGB 11.8 (L) 10/23/2023    HCT 36.8 10/23/2023     10/23/2023     Lab Results   Component Value Date    GLUCOSE 71 (L) 10/23/2023     10/23/2023    K 5.1 10/23/2023     10/23/2023    CO2 25 10/23/2023    ANIONGAP 14 10/23/2023    BUN 13 10/23/2023    CREATININE 0.76 10/23/2023    EGFR >90 10/23/2023    CALCIUM 9.2 10/23/2023    ALBUMIN 3.3 (L) 10/23/2023    PROT 6.3 (L) 10/23/2023    ALKPHOS 71 10/23/2023    ALT 16 10/23/2023    AST 12 10/23/2023    BILITOT 0.3 10/23/2023

## 2023-10-27 ENCOUNTER — APPOINTMENT (OUTPATIENT)
Dept: RADIOLOGY | Facility: HOSPITAL | Age: 38
End: 2023-10-27
Payer: OTHER GOVERNMENT

## 2023-10-27 ENCOUNTER — APPOINTMENT (OUTPATIENT)
Dept: PEDIATRIC CARDIOLOGY | Facility: CLINIC | Age: 38
End: 2023-10-27
Payer: OTHER GOVERNMENT

## 2023-10-27 LAB
GLUCOSE BLD MANUAL STRIP-MCNC: 124 MG/DL (ref 74–99)
GLUCOSE BLD MANUAL STRIP-MCNC: 132 MG/DL (ref 74–99)
GLUCOSE BLD MANUAL STRIP-MCNC: 133 MG/DL (ref 74–99)
GLUCOSE BLD MANUAL STRIP-MCNC: 95 MG/DL (ref 74–99)

## 2023-10-27 PROCEDURE — 76820 UMBILICAL ARTERY ECHO: CPT | Performed by: OBSTETRICS & GYNECOLOGY

## 2023-10-27 PROCEDURE — 2500000001 HC RX 250 WO HCPCS SELF ADMINISTERED DRUGS (ALT 637 FOR MEDICARE OP)

## 2023-10-27 PROCEDURE — 82947 ASSAY GLUCOSE BLOOD QUANT: CPT

## 2023-10-27 PROCEDURE — 76819 FETAL BIOPHYS PROFIL W/O NST: CPT | Performed by: OBSTETRICS & GYNECOLOGY

## 2023-10-27 PROCEDURE — 2500000001 HC RX 250 WO HCPCS SELF ADMINISTERED DRUGS (ALT 637 FOR MEDICARE OP): Performed by: STUDENT IN AN ORGANIZED HEALTH CARE EDUCATION/TRAINING PROGRAM

## 2023-10-27 PROCEDURE — 99232 SBSQ HOSP IP/OBS MODERATE 35: CPT

## 2023-10-27 PROCEDURE — 76821 MIDDLE CEREBRAL ARTERY ECHO: CPT | Performed by: OBSTETRICS & GYNECOLOGY

## 2023-10-27 PROCEDURE — 59025 FETAL NON-STRESS TEST: CPT

## 2023-10-27 PROCEDURE — 96372 THER/PROPH/DIAG INJ SC/IM: CPT | Performed by: STUDENT IN AN ORGANIZED HEALTH CARE EDUCATION/TRAINING PROGRAM

## 2023-10-27 PROCEDURE — 76820 UMBILICAL ARTERY ECHO: CPT

## 2023-10-27 PROCEDURE — 76818 FETAL BIOPHYS PROFILE W/NST: CPT

## 2023-10-27 PROCEDURE — 1210000001 HC SEMI-PRIVATE ROOM DAILY

## 2023-10-27 PROCEDURE — 2500000004 HC RX 250 GENERAL PHARMACY W/ HCPCS (ALT 636 FOR OP/ED): Performed by: STUDENT IN AN ORGANIZED HEALTH CARE EDUCATION/TRAINING PROGRAM

## 2023-10-27 PROCEDURE — 59025 FETAL NON-STRESS TEST: CPT | Mod: GC

## 2023-10-27 RX ORDER — INSULIN LISPRO 100 [IU]/ML
10 INJECTION, SOLUTION INTRAVENOUS; SUBCUTANEOUS
Status: DISCONTINUED | OUTPATIENT
Start: 2023-10-27 | End: 2023-11-03

## 2023-10-27 RX ORDER — INSULIN LISPRO 100 [IU]/ML
10 INJECTION, SOLUTION INTRAVENOUS; SUBCUTANEOUS
Status: DISCONTINUED | OUTPATIENT
Start: 2023-10-28 | End: 2023-10-27

## 2023-10-27 RX ORDER — INSULIN LISPRO 100 [IU]/ML
20 INJECTION, SOLUTION INTRAVENOUS; SUBCUTANEOUS
Status: DISCONTINUED | OUTPATIENT
Start: 2023-10-27 | End: 2023-10-30

## 2023-10-27 RX ADMIN — LABETALOL HYDROCHLORIDE 400 MG: 100 TABLET, FILM COATED ORAL at 08:52

## 2023-10-27 RX ADMIN — INSULIN LISPRO 28 UNITS: 100 INJECTION, SOLUTION INTRAVENOUS; SUBCUTANEOUS at 18:42

## 2023-10-27 RX ADMIN — ENOXAPARIN SODIUM 40 MG: 100 INJECTION SUBCUTANEOUS at 08:52

## 2023-10-27 RX ADMIN — CEPHALEXIN 250 MG: 250 CAPSULE ORAL at 08:53

## 2023-10-27 RX ADMIN — INSULIN GLARGINE 22 UNITS: 100 INJECTION, SOLUTION SUBCUTANEOUS at 07:14

## 2023-10-27 RX ADMIN — INSULIN LISPRO 20 UNITS: 100 INJECTION, SOLUTION INTRAVENOUS; SUBCUTANEOUS at 13:44

## 2023-10-27 RX ADMIN — NIFEDIPINE 60 MG: 60 TABLET, EXTENDED RELEASE ORAL at 09:01

## 2023-10-27 RX ADMIN — Medication 1000 UNITS: at 08:52

## 2023-10-27 RX ADMIN — INSULIN LISPRO 10 UNITS: 100 INJECTION, SOLUTION INTRAVENOUS; SUBCUTANEOUS at 09:03

## 2023-10-27 RX ADMIN — INSULIN GLARGINE 36 UNITS: 100 INJECTION, SOLUTION SUBCUTANEOUS at 21:36

## 2023-10-27 RX ADMIN — LABETALOL HYDROCHLORIDE 400 MG: 100 TABLET, FILM COATED ORAL at 21:37

## 2023-10-27 RX ADMIN — ASPIRIN 81 MG CHEWABLE TABLET 162 MG: 81 TABLET CHEWABLE at 08:53

## 2023-10-27 RX ADMIN — NIFEDIPINE 60 MG: 60 TABLET, EXTENDED RELEASE ORAL at 21:37

## 2023-10-27 RX ADMIN — PRENATAL VIT W/ FE FUMARATE-FA TAB 27-0.8 MG 1 TABLET: 27-0.8 TAB at 08:52

## 2023-10-27 ASSESSMENT — PAIN - FUNCTIONAL ASSESSMENT
PAIN_FUNCTIONAL_ASSESSMENT: 0-10
PAIN_FUNCTIONAL_ASSESSMENT: 0-10

## 2023-10-27 ASSESSMENT — PAIN SCALES - GENERAL
PAINLEVEL_OUTOF10: 0 - NO PAIN
PAINLEVEL_OUTOF10: 0 - NO PAIN

## 2023-10-27 NOTE — PROGRESS NOTES
Gabrielle David is a 38 y.o. female on day 7 of admission presenting with Severe preeclampsia, third trimester.    Social Work Assessment     Address: [address] [place], [place] [id number]  Phone: [phone]    Referral Reason: Support Visit     Prenatal Care: Yes    [place] Name: Pending    : Pending     Other Children: Yes, ages five and two     Household Composition: Lives at home with spouse, and dependent children     IPV/DV or Safety Concerns: No concerns voiced     Car-Seat: Yes   Safe Sleep Space: Yes  Safe Sleep Education: Not reviewed this visit    Transportation Concerns: None    School/Work/Income: Educator     Insurance: [facility] & [facility]     Mental Health Diagnoses: None Listed- SW will review S/S of PPD following delivery   Medication(s):   Counseling:     Supports: Spouse, and extended family members     Substance Use History: None     Toxicology Screens: N/A     Department of Children and Family Services (DCFS): N/A       Assessment: SW met with expectant mother at bedside to introduce self, and SW role.  Ms. David was friendly, and easy to engage in conversation.  She lives at home with her spouse and two dependent children ages two, and five.  No safety concerns reported.    She appears to be coping well with this hospitalization.  Support system includes her spouse, and extended family members.  Ms. David reports having supplies needed to care for baby including car seat, crib, and stroller.      Spouse has already purchased extended 30 day parking pass.  Ms. David denies having any unmet needs at this time       Plan: long stay patient- SW will continue to follow, and assist as needed       Signature: Nanette WELLINGTON LSW

## 2023-10-27 NOTE — CARE PLAN
The patient's goals for the shift include BP WDL, no S/Sx of PTL    The clinical goals for the shift include BP WDL, no S/Sx of PTL    Problem: Diabetes  Goal: Achieve decreasing blood glucose levels by end of shift  Outcome: Progressing  Goal: Increase stability of blood glucose readings by end of shift  Outcome: Progressing     Problem: Antepartum  Goal: Maintain pregnancy as long as maternal and/or fetal condition is stable  Outcome: Progressing  Goal: Avoid/minimize constipation  Outcome: Progressing  Goal: No decrease in circulation/VTE  Outcome: Progressing  Goal: FHR remains reassuring  Outcome: Progressing  Goal: Minimize anxiety/maximize coping  Outcome: Progressing     Problem: Hypertensive Disorder of Pregnancy (HDP)  Goal: Minimal s/sx of HDP and BP<160/110  Outcome: Progressing  Goal: Adequate urine output (0.5 ml/kg/hr)  Outcome: Progressing     Problem: Pain - Adult  Goal: Verbalizes/displays adequate comfort level or baseline comfort level  Outcome: Progressing     Problem: Safety - Adult  Goal: Free from fall injury  Outcome: Progressing     Problem: Discharge Planning  Goal: Discharge to home or other facility with appropriate resources  Outcome: Progressing     Problem: Chronic Conditions and Co-morbidities  Goal: Patient's chronic conditions and co-morbidity symptoms are monitored and maintained or improved  Outcome: Progressing

## 2023-10-27 NOTE — PROGRESS NOTES
Antepartum Progress Note    Assessment/Plan   Gabrielle David is a 38 y.o.  at 30w1d MANUEL: 2024, by Last Menstrual Period. Admitted for siPEC.    siPEC w/ SF  - Dx by severe range BPs requiring IV treatment  - IV labetalol 20/40 on admission  - HELLP labs neg over multiple sets, continue twice weekly labs  - asymptomatic  - Current BP Regimen: Nifed 60 BID + Lab 400 BID (10/22)  - Anticipate admission until delivery at 34wga pending clinical status    New FGR   - Last growth 10/25 EFW 1264g (9%), AC 13%  - Elevated UA dopplers   - for twice weekly BPP/dopplers, will follow up today     APLS   - Continue lovenox ppx 40 mg SC daily   - Continue  mg     A2DM   - A1c 5.1%   - Pregnancy regimen: Basaglar  and Lispro 10/18/24  - Regimen increased in the setting of BMZ, BG now improving out of BMZ window  -  Current regimen: Glargine  + Lispro 28 -> meal time regimen adjusted to 10/20/28  - Continue fasting and 1hr pp Bgs     AMA   - Declined cfDNA  - Nml anatomy      Recurrent UTI   - Continue daily Keflex suppression     IUP  - Daily NST while inpatient, AGA this AM  - s/p Tdap  - s/p 12hrs Mg GTT for fetal neuroprotection  - GBS POS 10/21  - Prior Classical C/S, For repeat CS x 3 if headed toward delivery  - s/p NICU cs      To be discussed with Dr. Adonis Kaur MD  PGY-2, Obstetrics and Gynecology  Maternal Fetal Medicine, pager: [phone]      Principal Problem:    Severe preeclampsia, third trimester  Active Problems:    Severe preeclampsia    Insomnia    History of  section, classical    High-risk pregnancy, elderly multigravida    Insulin controlled gestational diabetes mellitus (GDM) during pregnancy, antepartum    History of IUFD    Antiphospholipid antibody syndrome complicating pregnancy (CMS/HCC)    History of poor fetal growth    Multigravida of advanced maternal age in second trimester    Pregnancy Problems (from 23 to present)       Problem Noted  Resolved    Preeclampsia complicating hypertension 10/20/2023 by Susy Mccormick DO No    Priority:  Medium      Severe preeclampsia, third trimester 10/20/2023 by Santos Booth MD No    Priority:  Medium      Antiphospholipid antibody syndrome complicating pregnancy (CMS/HCC) 10/5/2023 by PRINCE Hull-CNP No    Priority:  Medium      Overview Signed 10/20/2023  6:57 PM by Santos Booth MD     Currently on Lovenox 40mg daily.  bASA 162mg daily         Recurrent urinary tract infection affecting pregnancy in second trimester 10/5/2023 by PRINCE Hull-CNP No    Priority:  Medium      Overview Signed 10/20/2023  6:57 PM by Santos Booth MD     On ppx Macrobid abx this pregnancy         History of poor fetal growth 10/5/2023 by PRINCE Hull-CNP No    Priority:  Medium      Overview Addendum 10/20/2023  6:56 PM by Santos Booth MD     H/O FGR in prior pregnancy   Last Growth US 10/6 EFW 940g 17% AC 20%         Multigravida of advanced maternal age in second trimester 10/5/2023 by PRINCE Hull-CNP No    Priority:  Medium      Overview Signed 10/20/2023  6:57 PM by Santos Booth MD     Declined cfDNA this pregnancy.         History of  section, classical 2023 by Stevo Low No    Priority:  Medium      Overview Signed 10/20/2023  6:52 PM by Santos Booth MD     For Repeat  if headed toward delivery.         High-risk pregnancy, elderly multigravida 2023 by Stevo Low No    Priority:  Medium      Insulin controlled gestational diabetes mellitus (GDM) during pregnancy, antepartum 2023 by Stevo Low No    Priority:  Medium      Overview Signed 10/20/2023  6:54 PM by Santos Booth MD     Current Regimen: Glargine , Lispro 10/18/24           Chronic hypertension in pregnancy 2023 by Stevo Low No    Priority:  Medium      History of IUFD 2023 by PRINCE Hull-CNP No    Priority:  Medium       Overview Signed 10/5/2023 11:48 AM by Mariza Bell, APRN-CNP     IUFD while undergoing surveillance inpatient in first pregnancy                 Subjective   Feeling well this am. Denies ctx, LOF, VB, or decreased fetal movement. Denies HA, vision changes, CP, SOB, or RUQ pain. Has noticed some increased swelling in hands/feet    Objective   Allergies:   Patient has no known allergies.    Last Vitals:  Temp Pulse Resp BP MAP Pulse Ox   36.6 °C (97.9 °F) 74 16 125/74   97 %     Vitals Min/Max Last 24 Hours:  Temp  Min: 36.6 °C (97.9 °F)  Max: 37.3 °C (99.1 °F)  Pulse  Min: 74  Max: 83  Resp  Min: 16  Max: 18  BP  Min: 113/75  Max: 141/77    Intake/Output:   No intake or output data in the 24 hours ending 10/27/23 0717      Physical Exam:  General: Well appearing, alert  HEENT: normocephalic, EOMI, clear sclera  Cardio: Warm and well perfused  Resp: breathing comfortably on room air  Abd: gravid  Neuro: grossly intact, no focal deficits  Extremities: full ROM, no calf tenderness, mild swelling of hands and feet  Psych: A&O x3, appropriate mood and affect    Fetal Assessment  FHT: 130/mod/+accel/-decel  [place]: quiet    Lab Data:  Lab Results   Component Value Date    WBC 8.5 10/26/2023    HGB 11.8 (L) 10/26/2023    HCT 35.9 (L) 10/26/2023     10/26/2023     Lab Results   Component Value Date    GLUCOSE 81 10/26/2023     10/26/2023    K 3.7 10/26/2023     10/26/2023    CO2 23 10/26/2023    ANIONGAP 13 10/26/2023    BUN 12 10/26/2023    CREATININE 0.76 10/26/2023    EGFR >90 10/26/2023    CALCIUM 8.8 10/26/2023    ALBUMIN 3.4 10/26/2023    PROT 6.4 10/26/2023    ALKPHOS 75 10/26/2023    ALT 10 10/26/2023    AST 12 10/26/2023    BILITOT 0.4 10/26/2023

## 2023-10-28 LAB
GLUCOSE BLD MANUAL STRIP-MCNC: 112 MG/DL (ref 74–99)
GLUCOSE BLD MANUAL STRIP-MCNC: 116 MG/DL (ref 74–99)
GLUCOSE BLD MANUAL STRIP-MCNC: 82 MG/DL (ref 74–99)
GLUCOSE BLD MANUAL STRIP-MCNC: 90 MG/DL (ref 74–99)

## 2023-10-28 PROCEDURE — 59025 FETAL NON-STRESS TEST: CPT | Mod: GC

## 2023-10-28 PROCEDURE — 99232 SBSQ HOSP IP/OBS MODERATE 35: CPT

## 2023-10-28 PROCEDURE — 96372 THER/PROPH/DIAG INJ SC/IM: CPT | Performed by: STUDENT IN AN ORGANIZED HEALTH CARE EDUCATION/TRAINING PROGRAM

## 2023-10-28 PROCEDURE — 82947 ASSAY GLUCOSE BLOOD QUANT: CPT

## 2023-10-28 PROCEDURE — 59025 FETAL NON-STRESS TEST: CPT

## 2023-10-28 PROCEDURE — 2500000001 HC RX 250 WO HCPCS SELF ADMINISTERED DRUGS (ALT 637 FOR MEDICARE OP): Performed by: STUDENT IN AN ORGANIZED HEALTH CARE EDUCATION/TRAINING PROGRAM

## 2023-10-28 PROCEDURE — 2500000001 HC RX 250 WO HCPCS SELF ADMINISTERED DRUGS (ALT 637 FOR MEDICARE OP)

## 2023-10-28 PROCEDURE — 2500000004 HC RX 250 GENERAL PHARMACY W/ HCPCS (ALT 636 FOR OP/ED): Performed by: STUDENT IN AN ORGANIZED HEALTH CARE EDUCATION/TRAINING PROGRAM

## 2023-10-28 PROCEDURE — 1210000001 HC SEMI-PRIVATE ROOM DAILY

## 2023-10-28 RX ORDER — LABETALOL 100 MG/1
400 TABLET, FILM COATED ORAL ONCE
Status: DISCONTINUED | OUTPATIENT
Start: 2023-10-28 | End: 2023-10-28

## 2023-10-28 RX ORDER — LABETALOL 100 MG/1
400 TABLET, FILM COATED ORAL 3 TIMES DAILY
Status: DISCONTINUED | OUTPATIENT
Start: 2023-10-29 | End: 2023-10-28

## 2023-10-28 RX ADMIN — INSULIN LISPRO 10 UNITS: 100 INJECTION, SOLUTION INTRAVENOUS; SUBCUTANEOUS at 10:11

## 2023-10-28 RX ADMIN — INSULIN GLARGINE 36 UNITS: 100 INJECTION, SOLUTION SUBCUTANEOUS at 21:17

## 2023-10-28 RX ADMIN — INSULIN GLARGINE 22 UNITS: 100 INJECTION, SOLUTION SUBCUTANEOUS at 08:58

## 2023-10-28 RX ADMIN — ASPIRIN 81 MG CHEWABLE TABLET 162 MG: 81 TABLET CHEWABLE at 08:54

## 2023-10-28 RX ADMIN — LABETALOL HYDROCHLORIDE 400 MG: 100 TABLET, FILM COATED ORAL at 21:18

## 2023-10-28 RX ADMIN — LABETALOL HYDROCHLORIDE 400 MG: 100 TABLET, FILM COATED ORAL at 08:52

## 2023-10-28 RX ADMIN — ENOXAPARIN SODIUM 40 MG: 100 INJECTION SUBCUTANEOUS at 08:55

## 2023-10-28 RX ADMIN — INSULIN LISPRO 28 UNITS: 100 INJECTION, SOLUTION INTRAVENOUS; SUBCUTANEOUS at 19:13

## 2023-10-28 RX ADMIN — NIFEDIPINE 60 MG: 60 TABLET, EXTENDED RELEASE ORAL at 10:16

## 2023-10-28 RX ADMIN — CEPHALEXIN 250 MG: 250 CAPSULE ORAL at 08:53

## 2023-10-28 RX ADMIN — NIFEDIPINE 60 MG: 60 TABLET, EXTENDED RELEASE ORAL at 21:18

## 2023-10-28 RX ADMIN — Medication 1000 UNITS: at 08:55

## 2023-10-28 RX ADMIN — PRENATAL VIT W/ FE FUMARATE-FA TAB 27-0.8 MG 1 TABLET: 27-0.8 TAB at 08:54

## 2023-10-28 RX ADMIN — INSULIN LISPRO 20 UNITS: 100 INJECTION, SOLUTION INTRAVENOUS; SUBCUTANEOUS at 13:41

## 2023-10-28 ASSESSMENT — PAIN SCALES - GENERAL
PAINLEVEL_OUTOF10: 0 - NO PAIN

## 2023-10-28 ASSESSMENT — PAIN - FUNCTIONAL ASSESSMENT
PAIN_FUNCTIONAL_ASSESSMENT: 0-10
PAIN_FUNCTIONAL_ASSESSMENT: 0-10

## 2023-10-28 NOTE — CARE PLAN
The patient's goals for the shift include I want to do fingersticks and not use my CGM.    The clinical goals for the shift include BP will remain below 160/100.    Problem: Diabetes  Goal: Achieve decreasing blood glucose levels by end of shift  Outcome: Progressing  Goal: Increase stability of blood glucose readings by end of shift  Outcome: Progressing     Problem: Diabetes  Goal: Achieve decreasing blood glucose levels by end of shift  Outcome: Progressing  Goal: Increase stability of blood glucose readings by end of shift  Outcome: Progressing     Problem: Antepartum  Goal: Maintain pregnancy as long as maternal and/or fetal condition is stable  Outcome: Progressing  Goal: Avoid/minimize constipation  Outcome: Progressing  Goal: No decrease in circulation/VTE  Outcome: Progressing  Goal: FHR remains reassuring  Outcome: Progressing  Goal: Minimize anxiety/maximize coping  Outcome: Progressing     Problem: Hypertensive Disorder of Pregnancy (HDP)  Goal: Minimal s/sx of HDP and BP<160/110  Outcome: Progressing  Goal: Adequate urine output (0.5 ml/kg/hr)  Outcome: Progressing     Problem: Pain - Adult  Goal: Verbalizes/displays adequate comfort level or baseline comfort level  Outcome: Progressing     Problem: Safety - Adult  Goal: Free from fall injury  Outcome: Progressing     Problem: Discharge Planning  Goal: Discharge to home or other facility with appropriate resources  Outcome: Progressing     Problem: Chronic Conditions and Co-morbidities  Goal: Patient's chronic conditions and co-morbidity symptoms are monitored and maintained or improved  Outcome: Progressing   VSS and pt. Denies s/sx of PEC.Her BS are WNL today and her NST was AGA this am.Her steroids are completed.She continues on labetalol and nifedipine as ordered.Stable.

## 2023-10-28 NOTE — PROGRESS NOTES
Antepartum Progress Note    Assessment/Plan     Gabrielle David is a 38 y.o.  at 30w2d MANUEL: 2024, by Last Menstrual Period. Admitted for siPEC w/SF.     siPEC w/ SF  - Dx by severe range BPs requiring IV treatment  - IV labetalol 20/40 on admission  - HELLP labs neg over multiple sets, continue twice weekly labs  - asymptomatic, normotensive to mild range BPs  - Current BP Regimen: Nifed 60 BID + Lab 400 BID (10/22)  - Anticipate admission until delivery at 34wga pending clinical status     New FGR   - Last growth 10/25 EFW 1264g (9%), AC 13%  - Elevated UA dopplers   - for twice weekly BPP/dopplers,      APLS   - Continue lovenox ppx 40 mg SC daily   - Continue  mg     A2DM   - A1c 5.1%   - Pregnancy regimen: Basaglar  and Lispro 10/18/24  - Regimen increased in the setting of BMZ, BG now improving out of BMZ window  -  Current regimen: Glargine  + Lispro 28 -> Lispro 10/20/28   - Continue fasting and 1hr pp Bgs     AMA   - Declined cfDNA  - Nml anatomy      Recurrent UTI   - Continue daily Keflex suppression     IUP  - Daily NST while inpatient, AGA this AM  - s/p Tdap  - s/p 12hrs Mg GTT for fetal neuroprotection  - GBS POS 10/21  - Prior Classical C/S, For repeat CS x 3 if headed toward delivery  - s/p NICU cs      Disp: continue inpatient management     To d/w Dr. Lamb,  Nichelle Harrison MD, PGY-2     Principal Problem:    Severe preeclampsia, third trimester  Active Problems:    Severe preeclampsia    Insomnia    History of  section, classical    High-risk pregnancy, elderly multigravida    Insulin controlled gestational diabetes mellitus (GDM) during pregnancy, antepartum    History of IUFD    Antiphospholipid antibody syndrome complicating pregnancy (CMS/HCC)    History of poor fetal growth    Multigravida of advanced maternal age in second trimester    Pregnancy Problems (from 23 to present)       Problem Noted Resolved    Preeclampsia complicating  hypertension 10/20/2023 by Susy Mccormick DO No    Priority:  Medium      Severe preeclampsia, third trimester 10/20/2023 by Santos Booth MD No    Priority:  Medium      Antiphospholipid antibody syndrome complicating pregnancy (CMS/HCC) 10/5/2023 by RYAN Hull No    Priority:  Medium      Overview Signed 10/20/2023  6:57 PM by Santos Booth MD     Currently on Lovenox 40mg daily.  bASA 162mg daily         Recurrent urinary tract infection affecting pregnancy in second trimester 10/5/2023 by RYAN Hull No    Priority:  Medium      Overview Signed 10/20/2023  6:57 PM by Santos Booth MD     On ppx Macrobid abx this pregnancy         History of poor fetal growth 10/5/2023 by RYAN Hull No    Priority:  Medium      Overview Addendum 10/20/2023  6:56 PM by Santos Booth MD     H/O FGR in prior pregnancy   Last Growth US 10/6 EFW 940g 17% AC 20%         Multigravida of advanced maternal age in second trimester 10/5/2023 by PRINCE Hull-CNP No    Priority:  Medium      Overview Signed 10/20/2023  6:57 PM by Santos Booth MD     Declined cfDNA this pregnancy.         History of  section, classical 2023 by Stevo Low No    Priority:  Medium      Overview Signed 10/20/2023  6:52 PM by Santos Booth MD     For Repeat  if headed toward delivery.         High-risk pregnancy, elderly multigravida 2023 by Stevo Low No    Priority:  Medium      Insulin controlled gestational diabetes mellitus (GDM) during pregnancy, antepartum 2023 by Stevo Low No    Priority:  Medium      Overview Signed 10/20/2023  6:54 PM by Santos Booth MD     Current Regimen: Glargine , Lispro 10/18/24           Chronic hypertension in pregnancy 2023 by Stevo Low No    Priority:  Medium      History of IUFD 2023 by RYAN Hull No    Priority:  Medium      Overview Signed 10/5/2023 11:48 AM  "by Mariza Bell, APRN-CNP     IUFD while undergoing surveillance inpatient in first pregnancy                 Subjective   No overnight complaints. Denies HA, change in vision, SOB, CP and RUQ pain. Denies ctx, VB, LF, feels good fetal movement     Objective   Allergies:   Patient has no known allergies.    Last Vitals:  Temp Pulse Resp BP MAP Pulse Ox   36 °C (96.8 °F) 74 18 127/74   99 %     Vitals Min/Max Last 24 Hours:  Temp  Min: 36 °C (96.8 °F)  Max: 37.3 °C (99.1 °F)  Pulse  Min: 68  Max: 76  Resp  Min: 17  Max: 20  BP  Min: 127/74  Max: 143/74    Intake/Output:   No intake or output data in the 24 hours ending 10/28/23 0555    Physical Exam:  GENERAL: Examination reveals a well developed, well nourished, gravid female in no acute distress. She is alert and cooperative.  LUNGS: clear to auscultation bilaterally  HEART: regular rate and rhythm, S1, S2 normal, no murmur, click, rub or gallop  ABDOMEN: soft, gravid, nontender, nondistended, no abnormal masses, no epigastric pain  FHR is 135, moderate variability, +accel, - decel   [place] reading:  no ctx   SKIN: normal coloration and turgor, no rashes  NEUROLOGICAL: alert, oriented, normal speech, no focal findings or movement disorder noted  PSYCHOLOGICAL: awake and alert; oriented to person, place, and time    Lab Data:  Lab Results   Component Value Date    WBC 8.5 10/26/2023    HGB 11.8 (L) 10/26/2023    HCT 35.9 (L) 10/26/2023     10/26/2023     No results found for: \"GRPBSTREP\"  Lab Results   Component Value Date    GLUCOSE 81 10/26/2023     10/26/2023    K 3.7 10/26/2023     10/26/2023    CO2 23 10/26/2023    ANIONGAP 13 10/26/2023    BUN 12 10/26/2023    CREATININE 0.76 10/26/2023    EGFR >90 10/26/2023    CALCIUM 8.8 10/26/2023    ALBUMIN 3.4 10/26/2023    PROT 6.4 10/26/2023    ALKPHOS 75 10/26/2023    ALT 10 10/26/2023    AST 12 10/26/2023    BILITOT 0.4 10/26/2023     No results found for: \"UTPCR\"  No results found for: \"APTT\", " "\"PROTIME\", \"INR\"  "

## 2023-10-29 LAB
GLUCOSE BLD MANUAL STRIP-MCNC: 100 MG/DL (ref 74–99)
GLUCOSE BLD MANUAL STRIP-MCNC: 84 MG/DL (ref 74–99)
GLUCOSE BLD MANUAL STRIP-MCNC: 86 MG/DL (ref 74–99)
GLUCOSE BLD MANUAL STRIP-MCNC: 87 MG/DL (ref 74–99)

## 2023-10-29 PROCEDURE — 82947 ASSAY GLUCOSE BLOOD QUANT: CPT

## 2023-10-29 PROCEDURE — 59025 FETAL NON-STRESS TEST: CPT | Mod: GC

## 2023-10-29 PROCEDURE — 96372 THER/PROPH/DIAG INJ SC/IM: CPT | Performed by: STUDENT IN AN ORGANIZED HEALTH CARE EDUCATION/TRAINING PROGRAM

## 2023-10-29 PROCEDURE — 2500000001 HC RX 250 WO HCPCS SELF ADMINISTERED DRUGS (ALT 637 FOR MEDICARE OP): Performed by: STUDENT IN AN ORGANIZED HEALTH CARE EDUCATION/TRAINING PROGRAM

## 2023-10-29 PROCEDURE — 2500000004 HC RX 250 GENERAL PHARMACY W/ HCPCS (ALT 636 FOR OP/ED): Performed by: STUDENT IN AN ORGANIZED HEALTH CARE EDUCATION/TRAINING PROGRAM

## 2023-10-29 PROCEDURE — 59025 FETAL NON-STRESS TEST: CPT

## 2023-10-29 PROCEDURE — 2500000001 HC RX 250 WO HCPCS SELF ADMINISTERED DRUGS (ALT 637 FOR MEDICARE OP)

## 2023-10-29 PROCEDURE — 1210000001 HC SEMI-PRIVATE ROOM DAILY

## 2023-10-29 PROCEDURE — 99232 SBSQ HOSP IP/OBS MODERATE 35: CPT

## 2023-10-29 RX ADMIN — ENOXAPARIN SODIUM 40 MG: 100 INJECTION SUBCUTANEOUS at 08:45

## 2023-10-29 RX ADMIN — CEPHALEXIN 250 MG: 250 CAPSULE ORAL at 08:46

## 2023-10-29 RX ADMIN — INSULIN LISPRO 20 UNITS: 100 INJECTION, SOLUTION INTRAVENOUS; SUBCUTANEOUS at 12:16

## 2023-10-29 RX ADMIN — INSULIN LISPRO 28 UNITS: 100 INJECTION, SOLUTION INTRAVENOUS; SUBCUTANEOUS at 18:46

## 2023-10-29 RX ADMIN — INSULIN GLARGINE 36 UNITS: 100 INJECTION, SOLUTION SUBCUTANEOUS at 21:30

## 2023-10-29 RX ADMIN — NIFEDIPINE 60 MG: 60 TABLET, EXTENDED RELEASE ORAL at 21:30

## 2023-10-29 RX ADMIN — PRENATAL VIT W/ FE FUMARATE-FA TAB 27-0.8 MG 1 TABLET: 27-0.8 TAB at 08:45

## 2023-10-29 RX ADMIN — ASPIRIN 81 MG CHEWABLE TABLET 162 MG: 81 TABLET CHEWABLE at 08:45

## 2023-10-29 RX ADMIN — LABETALOL HYDROCHLORIDE 400 MG: 100 TABLET, FILM COATED ORAL at 21:30

## 2023-10-29 RX ADMIN — NIFEDIPINE 60 MG: 60 TABLET, EXTENDED RELEASE ORAL at 10:07

## 2023-10-29 RX ADMIN — LABETALOL HYDROCHLORIDE 400 MG: 100 TABLET, FILM COATED ORAL at 10:06

## 2023-10-29 RX ADMIN — Medication 1000 UNITS: at 08:45

## 2023-10-29 RX ADMIN — INSULIN LISPRO 10 UNITS: 100 INJECTION, SOLUTION INTRAVENOUS; SUBCUTANEOUS at 08:47

## 2023-10-29 RX ADMIN — INSULIN GLARGINE 22 UNITS: 100 INJECTION, SOLUTION SUBCUTANEOUS at 08:46

## 2023-10-29 ASSESSMENT — PAIN - FUNCTIONAL ASSESSMENT
PAIN_FUNCTIONAL_ASSESSMENT: 0-10

## 2023-10-29 ASSESSMENT — PAIN SCALES - GENERAL
PAINLEVEL_OUTOF10: 0 - NO PAIN

## 2023-10-29 NOTE — PROGRESS NOTES
Antepartum Progress Note    Assessment/Plan   Gabrielle David is a 38 y.o.  at 30w3d. MANUEL: 2024, by Last Menstrual Period., who is admitted for siPEC w/SF.     siPEC w/ SF  - Dx by severe range BPs requiring IV treatment  - IV labetalol 20/40 on admission  - HELLP labs neg over multiple sets, continue twice weekly labs  - asymptomatic, normotensive to mild range BPs  - Current BP Regimen: Nifed 60 BID + Lab 400 BID (10/22)  - Anticipate admission until delivery at 34wga pending clinical status     New FGR   - Last growth 10/25 EFW 1264g (9%), AC 13%  - Elevated UA dopplers   - for twice weekly BPP/dopplers,      APLS   - Continue lovenox ppx 40 mg SC daily   - Continue  mg     A2DM   - A1c 5.1%   - Pregnancy regimen: Basaglar  and Lispro 10/18/24  - Regimen increased in the setting of BMZ, BG now improving out of BMZ window  -  Current regimen: Glargine  + Lispro 28 -> Lispro 10/20/28   - Continue fasting and 1hr pp Bgs     AMA   - Declined cfDNA  - Nml anatomy      Recurrent UTI   - Continue daily Keflex suppression     IUP  - Daily NST while inpatient, AGA this AM  - s/p Tdap  - s/p 12hrs Mg GTT for fetal neuroprotection  - GBS POS 10/21  - Prior Classical C/S, For repeat CS x 3 if headed toward delivery  - s/p NICU cs      Disp: continue inpatient management      To d/w Dr. Lamb,  Nichelle Harrison MD, PGY-2       Principal Problem:    Severe preeclampsia, third trimester  Active Problems:    Severe preeclampsia    Insomnia    History of  section, classical    High-risk pregnancy, elderly multigravida    Insulin controlled gestational diabetes mellitus (GDM) during pregnancy, antepartum    History of IUFD    Antiphospholipid antibody syndrome complicating pregnancy (CMS/HCC)    History of poor fetal growth    Multigravida of advanced maternal age in second trimester    Pregnancy Problems (from 23 to present)       Problem Noted Resolved    Preeclampsia complicating  hypertension 10/20/2023 by Susy Mccormick DO No    Priority:  Medium      Severe preeclampsia, third trimester 10/20/2023 by Santos Booth MD No    Priority:  Medium      Antiphospholipid antibody syndrome complicating pregnancy (CMS/HCC) 10/5/2023 by RYAN Hull No    Priority:  Medium      Overview Signed 10/20/2023  6:57 PM by Santos Booth MD     Currently on Lovenox 40mg daily.  bASA 162mg daily         Recurrent urinary tract infection affecting pregnancy in second trimester 10/5/2023 by RYAN Hull No    Priority:  Medium      Overview Signed 10/20/2023  6:57 PM by Santos Booth MD     On ppx Macrobid abx this pregnancy         History of poor fetal growth 10/5/2023 by RYAN Hull No    Priority:  Medium      Overview Addendum 10/20/2023  6:56 PM by Santos Booth MD     H/O FGR in prior pregnancy   Last Growth US 10/6 EFW 940g 17% AC 20%         Multigravida of advanced maternal age in second trimester 10/5/2023 by PRINCE Hull-CNP No    Priority:  Medium      Overview Signed 10/20/2023  6:57 PM by Santos Booth MD     Declined cfDNA this pregnancy.         History of  section, classical 2023 by Stevo Low No    Priority:  Medium      Overview Signed 10/20/2023  6:52 PM by Santos Booth MD     For Repeat  if headed toward delivery.         High-risk pregnancy, elderly multigravida 2023 by Stevo Low No    Priority:  Medium      Insulin controlled gestational diabetes mellitus (GDM) during pregnancy, antepartum 2023 by Stevo Low No    Priority:  Medium      Overview Signed 10/20/2023  6:54 PM by Santos Booth MD     Current Regimen: Glargine , Lispro 10/18/24           Chronic hypertension in pregnancy 2023 by Stevo Low No    Priority:  Medium      History of IUFD 2023 by RYAN Hull No    Priority:  Medium      Overview Signed 10/5/2023 11:48 AM  by Mariza Bell, PRINCE-CNP     IUFD while undergoing surveillance inpatient in first pregnancy                 Subjective   No overnight complaints. Denies HA, change in vision, SOB, CP and RUQ pain. Denies ctx, VB, LOF. Feels good fetal movement.     Objective   Allergies:   Patient has no known allergies.    Last Vitals:  Temp Pulse Resp BP MAP Pulse Ox   36.8 °C (98.2 °F) 80 18 136/78   97 %     Vitals Min/Max Last 24 Hours:  Temp  Min: 36.8 °C (98.2 °F)  Max: 37.4 °C (99.3 °F)  Pulse  Min: 69  Max: 83  Resp  Min: 18  Max: 18  BP  Min: 122/70  Max: 154/88    Intake/Output:   No intake or output data in the 24 hours ending 10/29/23 0545    Physical Exam:  GENERAL: Examination reveals a well developed, well nourished, gravid female in no acute distress. She is alert and cooperative.  LUNGS: clear to auscultation bilaterally  HEART: regular rate and rhythm, S1, S2 normal, no murmur, click, rub or gallop  OB: , moderate variability, +accel, - decel,   [place]: no ctx   ABDOMEN: soft, gravid, nontender, nondistended, no abnormal masses, no epigastric pain  NEUROLOGICAL: alert, oriented, normal speech, no focal findings or movement disorder noted  PSYCHOLOGICAL: awake and alert; oriented to person, place, and time    Lab Data:  Lab Results   Component Value Date    WBC 8.5 10/26/2023    HGB 11.8 (L) 10/26/2023    HCT 35.9 (L) 10/26/2023     10/26/2023     Lab Results   Component Value Date    GLUCOSE 81 10/26/2023     10/26/2023    K 3.7 10/26/2023     10/26/2023    CO2 23 10/26/2023    ANIONGAP 13 10/26/2023    BUN 12 10/26/2023    CREATININE 0.76 10/26/2023    EGFR >90 10/26/2023    CALCIUM 8.8 10/26/2023    ALBUMIN 3.4 10/26/2023    PROT 6.4 10/26/2023    ALKPHOS 75 10/26/2023    ALT 10 10/26/2023    AST 12 10/26/2023    BILITOT 0.4 10/26/2023

## 2023-10-29 NOTE — CARE PLAN
The patient's goals for the shift include BS and BP maintenance    The clinical goals for the shift include BP WNL    VS stable throughout shift. BS well managed throughout shift. No OB complaints at this time. Remained free of falls/injury throughout shift. Resting comfortably in bed, denies needs at this time.

## 2023-10-30 ENCOUNTER — APPOINTMENT (OUTPATIENT)
Dept: RADIOLOGY | Facility: HOSPITAL | Age: 38
End: 2023-10-30
Payer: OTHER GOVERNMENT

## 2023-10-30 PROBLEM — O36.5930 POOR FETAL GROWTH AFFECTING MANAGEMENT OF MOTHER IN THIRD TRIMESTER (HHS-HCC): Status: ACTIVE | Noted: 2023-10-05

## 2023-10-30 PROBLEM — B95.1 GROUP B STREPTOCOCCAL INFECTION DURING PREGNANCY (HHS-HCC): Status: ACTIVE | Noted: 2023-10-30

## 2023-10-30 PROBLEM — O11.9 CHRONIC HYPERTENSION WITH SUPERIMPOSED PREECLAMPSIA (HHS-HCC): Status: ACTIVE | Noted: 2023-10-20

## 2023-10-30 PROBLEM — O98.819 GROUP B STREPTOCOCCAL INFECTION DURING PREGNANCY (HHS-HCC): Status: ACTIVE | Noted: 2023-10-30

## 2023-10-30 LAB
GLUCOSE BLD MANUAL STRIP-MCNC: 107 MG/DL (ref 74–99)
GLUCOSE BLD MANUAL STRIP-MCNC: 118 MG/DL (ref 74–99)
GLUCOSE BLD MANUAL STRIP-MCNC: 155 MG/DL (ref 74–99)
GLUCOSE BLD MANUAL STRIP-MCNC: 69 MG/DL (ref 74–99)

## 2023-10-30 PROCEDURE — 76820 UMBILICAL ARTERY ECHO: CPT | Performed by: OBSTETRICS & GYNECOLOGY

## 2023-10-30 PROCEDURE — 76819 FETAL BIOPHYS PROFIL W/O NST: CPT | Performed by: OBSTETRICS & GYNECOLOGY

## 2023-10-30 PROCEDURE — 1210000001 HC SEMI-PRIVATE ROOM DAILY

## 2023-10-30 PROCEDURE — 2500000001 HC RX 250 WO HCPCS SELF ADMINISTERED DRUGS (ALT 637 FOR MEDICARE OP): Performed by: STUDENT IN AN ORGANIZED HEALTH CARE EDUCATION/TRAINING PROGRAM

## 2023-10-30 PROCEDURE — 76818 FETAL BIOPHYS PROFILE W/NST: CPT

## 2023-10-30 PROCEDURE — 99233 SBSQ HOSP IP/OBS HIGH 50: CPT | Performed by: OBSTETRICS & GYNECOLOGY

## 2023-10-30 PROCEDURE — 2500000001 HC RX 250 WO HCPCS SELF ADMINISTERED DRUGS (ALT 637 FOR MEDICARE OP)

## 2023-10-30 PROCEDURE — 96372 THER/PROPH/DIAG INJ SC/IM: CPT | Performed by: STUDENT IN AN ORGANIZED HEALTH CARE EDUCATION/TRAINING PROGRAM

## 2023-10-30 PROCEDURE — 76820 UMBILICAL ARTERY ECHO: CPT

## 2023-10-30 PROCEDURE — 82947 ASSAY GLUCOSE BLOOD QUANT: CPT

## 2023-10-30 PROCEDURE — 2500000004 HC RX 250 GENERAL PHARMACY W/ HCPCS (ALT 636 FOR OP/ED): Performed by: STUDENT IN AN ORGANIZED HEALTH CARE EDUCATION/TRAINING PROGRAM

## 2023-10-30 PROCEDURE — 76821 MIDDLE CEREBRAL ARTERY ECHO: CPT | Performed by: OBSTETRICS & GYNECOLOGY

## 2023-10-30 RX ORDER — INSULIN LISPRO 100 [IU]/ML
18 INJECTION, SOLUTION INTRAVENOUS; SUBCUTANEOUS
Status: DISCONTINUED | OUTPATIENT
Start: 2023-10-30 | End: 2023-11-03

## 2023-10-30 RX ORDER — BLOOD-GLUCOSE SENSOR
EACH MISCELLANEOUS
COMMUNITY
Start: 2023-09-02

## 2023-10-30 RX ORDER — BLOOD-GLUCOSE TRANSMITTER
EACH MISCELLANEOUS
COMMUNITY
Start: 2023-09-02

## 2023-10-30 RX ORDER — INSULIN GLARGINE 100 [IU]/ML
34 INJECTION, SOLUTION SUBCUTANEOUS NIGHTLY
Status: DISCONTINUED | OUTPATIENT
Start: 2023-10-30 | End: 2023-11-03

## 2023-10-30 RX ORDER — INSULIN LISPRO 100 [IU]/ML
24 INJECTION, SOLUTION INTRAVENOUS; SUBCUTANEOUS
Status: DISCONTINUED | OUTPATIENT
Start: 2023-10-30 | End: 2023-11-03

## 2023-10-30 RX ORDER — LABETALOL 100 MG/1
200 TABLET, FILM COATED ORAL ONCE
Status: COMPLETED | OUTPATIENT
Start: 2023-10-31 | End: 2023-10-31

## 2023-10-30 RX ORDER — LABETALOL 100 MG/1
600 TABLET, FILM COATED ORAL 2 TIMES DAILY
Status: DISCONTINUED | OUTPATIENT
Start: 2023-10-31 | End: 2023-11-01

## 2023-10-30 RX ADMIN — LABETALOL HYDROCHLORIDE 400 MG: 100 TABLET, FILM COATED ORAL at 09:33

## 2023-10-30 RX ADMIN — INSULIN LISPRO 24 UNITS: 100 INJECTION, SOLUTION INTRAVENOUS; SUBCUTANEOUS at 19:15

## 2023-10-30 RX ADMIN — INSULIN GLARGINE 22 UNITS: 100 INJECTION, SOLUTION SUBCUTANEOUS at 06:51

## 2023-10-30 RX ADMIN — PRENATAL VIT W/ FE FUMARATE-FA TAB 27-0.8 MG 1 TABLET: 27-0.8 TAB at 09:40

## 2023-10-30 RX ADMIN — INSULIN LISPRO 10 UNITS: 100 INJECTION, SOLUTION INTRAVENOUS; SUBCUTANEOUS at 09:09

## 2023-10-30 RX ADMIN — Medication 1000 UNITS: at 09:32

## 2023-10-30 RX ADMIN — INSULIN GLARGINE 34 UNITS: 100 INJECTION, SOLUTION SUBCUTANEOUS at 22:01

## 2023-10-30 RX ADMIN — INSULIN LISPRO 18 UNITS: 100 INJECTION, SOLUTION INTRAVENOUS; SUBCUTANEOUS at 12:50

## 2023-10-30 RX ADMIN — ASPIRIN 81 MG CHEWABLE TABLET 162 MG: 81 TABLET CHEWABLE at 09:32

## 2023-10-30 RX ADMIN — ENOXAPARIN SODIUM 40 MG: 100 INJECTION SUBCUTANEOUS at 09:34

## 2023-10-30 RX ADMIN — LABETALOL HYDROCHLORIDE 400 MG: 100 TABLET, FILM COATED ORAL at 22:05

## 2023-10-30 RX ADMIN — NIFEDIPINE 60 MG: 60 TABLET, EXTENDED RELEASE ORAL at 22:05

## 2023-10-30 RX ADMIN — NIFEDIPINE 60 MG: 60 TABLET, EXTENDED RELEASE ORAL at 09:34

## 2023-10-30 RX ADMIN — CEPHALEXIN 250 MG: 250 CAPSULE ORAL at 09:33

## 2023-10-30 ASSESSMENT — PAIN - FUNCTIONAL ASSESSMENT
PAIN_FUNCTIONAL_ASSESSMENT: 0-10

## 2023-10-30 ASSESSMENT — PAIN SCALES - GENERAL
PAINLEVEL_OUTOF10: 0 - NO PAIN

## 2023-10-30 NOTE — PROGRESS NOTES
Antepartum Progress Note    Assessment/Plan   Gabrielle David is a 38 y.o.  at 30w4d. MANUEL: 2024 who is admitted for siPEC w/SF.     CHTN w/ siPEC w/ SF  - Dx by severe range BPs requiring IV treatment  - IV labetalol 20/40 on admission  - HELLP labs neg to date, continue twice weekly labs  - asymptomatic, normotensive to mild range BPs  - Current BP Regimen: Nifed 60 BID + Lab 400 BID (10/22), Bps mild range this AM, continue to monitor, can consider uptitration today  - Anticipate admission until delivery at 34wga pending clinical status     FGR   - Last growth 10/25 EFW 1264g (9%), AC 13%  - Elevated UA dopplers, PI >99%ile 10/30/2023   - for twice weekly BPP/dopplers, BPP this AM for non-reactive NST     APLS   - Continue lovenox ppx 40 mg SC daily   - Continue  mg     A2DM   - A1c 5.1%   - Pregnancy regimen prior to admission: Basaglar  and Lispro 10/18/24  - Regimen increased in the setting of BMZ, insulin requirements now decreasing, globally low BG yesterday  -Current regimen adjusted today to: Glargine  + Lispro 10/18/24  - Continue fasting and 1hr pp Bgs    Recurrent UTI   - Continue daily Keflex suppression     IUP  - Daily NST while inpatient, nonreactive this AM, for BPP  - s/p Tdap  - s/p 12hrs Mg GTT for fetal neuroprotection  - GBS POS 10/21  - Prior Classical C/S, For repeat CS x 3 if headed toward delivery  - s/p NICU cs      Dispo: continue inpatient management      To be discussed with Dr. Jude Kaur MD  PGY-2, Obstetrics and Gynecology  Maternal Fetal Medicine, pager: [phone]    Principal Problem:    Chronic hypertension with superimposed preeclampsia  Active Problems:    History of  section, classical    Insulin controlled gestational diabetes mellitus (GDM) in third trimester    History of IUFD    Antiphospholipid antibody syndrome complicating pregnancy (CMS/HCC)    Recurrent urinary tract infection affecting pregnancy in second trimester    Poor  fetal growth affecting management of mother in third trimester    Group B streptococcal infection during pregnancy    Subjective   Feeling like BG is a little low this AM. Denies HA, change in vision, SOB, CP and RUQ pain. Denies ctx, VB, LOF. Feels good fetal movement.     Objective   Allergies:   Patient has no known allergies.    Last Vitals:  Temp Pulse Resp BP MAP Pulse Ox   36.6 °C (97.9 °F) 84 18 138/86   98 %     Vitals Min/Max Last 24 Hours:  Temp  Min: 36.4 °C (97.5 °F)  Max: 37.1 °C (98.8 °F)  Pulse  Min: 74  Max: 84  Resp  Min: 18  Max: 18  BP  Min: 132/75  Max: 149/71    Intake/Output:   No intake or output data in the 24 hours ending 10/30/23 1231    Physical Exam:  General: Well appearing, alert  Cardio: Warm and well perfused  Resp: breathing comfortably on room air  Abd: gravid    Fetal Assessment  FHT: 135/mod/+accel/-decel  [place]: quiet  Interpretation: non-reactive    Lab Data:  AM labs pending today    I saw and evaluated the patient. I personally obtained the key and critical portions of the history and physical exam or was physically present for key and critical portions performed by the resident/fellow. I reviewed the resident/fellow's documentation and discussed the patient with the resident/fellow. I agree with the resident/fellow's medical decision making as documented in the note.     Assumed care of patient and hospital chart reviewed. Briefly, Ms. David is a  at 30w4d admitted for CHTN with DELLA with SF. Currently normotensive to mild range on Nifedipine 60mg BID and Labetalol 400mg BID. Had one BP >150/100 which she reports was right after she was out of the shower. If she continued to have Bps >150/100 we will increase her Labetalol to 400mg TID. NST non-reactive today, BPP 8/10. Elevated UA Dopplers, normal DV. Continue inpatient management. Patient is currently on Lovenox 40mg every day for APLS. We reviewed that we will delay AM Lovenox till morning rounds are completed to  insure there is no immediate indication for delivery. We also discussed the possibility of transitioning to Heparin however given need for increased dosing and risk of HIT will continue Lovenox cautiously. Patient with low blood sugar this AM and therefore night time long acting dose was decreased. Will continue to closely  monitor.     Abdirashid Roque MD  [facility]

## 2023-10-31 LAB
ABO GROUP (TYPE) IN BLOOD: NORMAL
ALBUMIN SERPL BCP-MCNC: 3 G/DL (ref 3.4–5)
ALP SERPL-CCNC: 83 U/L (ref 33–110)
ALT SERPL W P-5'-P-CCNC: 11 U/L (ref 7–45)
ANION GAP SERPL CALC-SCNC: 15 MMOL/L (ref 10–20)
ANTIBODY SCREEN: NORMAL
AST SERPL W P-5'-P-CCNC: 13 U/L (ref 9–39)
BILIRUB SERPL-MCNC: 0.4 MG/DL (ref 0–1.2)
BUN SERPL-MCNC: 16 MG/DL (ref 6–23)
CALCIUM SERPL-MCNC: 9 MG/DL (ref 8.6–10.6)
CHLORIDE SERPL-SCNC: 107 MMOL/L (ref 98–107)
CO2 SERPL-SCNC: 20 MMOL/L (ref 21–32)
CREAT SERPL-MCNC: 0.62 MG/DL (ref 0.5–1.05)
ERYTHROCYTE [DISTWIDTH] IN BLOOD BY AUTOMATED COUNT: 14.1 % (ref 11.5–14.5)
GFR SERPL CREATININE-BSD FRML MDRD: >90 ML/MIN/1.73M*2
GLUCOSE BLD MANUAL STRIP-MCNC: 116 MG/DL (ref 74–99)
GLUCOSE BLD MANUAL STRIP-MCNC: 148 MG/DL (ref 74–99)
GLUCOSE BLD MANUAL STRIP-MCNC: 73 MG/DL (ref 74–99)
GLUCOSE BLD MANUAL STRIP-MCNC: 88 MG/DL (ref 74–99)
GLUCOSE SERPL-MCNC: 79 MG/DL (ref 74–99)
HCT VFR BLD AUTO: 35.7 % (ref 36–46)
HGB BLD-MCNC: 12 G/DL (ref 12–16)
MCH RBC QN AUTO: 31.5 PG (ref 26–34)
MCHC RBC AUTO-ENTMCNC: 33.6 G/DL (ref 32–36)
MCV RBC AUTO: 94 FL (ref 80–100)
NRBC BLD-RTO: 0 /100 WBCS (ref 0–0)
PLATELET # BLD AUTO: 259 X10*3/UL (ref 150–450)
PMV BLD AUTO: 10 FL (ref 7.5–11.5)
POTASSIUM SERPL-SCNC: 4.1 MMOL/L (ref 3.5–5.3)
PROT SERPL-MCNC: 5.8 G/DL (ref 6.4–8.2)
RBC # BLD AUTO: 3.81 X10*6/UL (ref 4–5.2)
RH FACTOR (ANTIGEN D): NORMAL
SODIUM SERPL-SCNC: 138 MMOL/L (ref 136–145)
WBC # BLD AUTO: 8.5 X10*3/UL (ref 4.4–11.3)

## 2023-10-31 PROCEDURE — 36415 COLL VENOUS BLD VENIPUNCTURE: CPT | Performed by: STUDENT IN AN ORGANIZED HEALTH CARE EDUCATION/TRAINING PROGRAM

## 2023-10-31 PROCEDURE — 2500000001 HC RX 250 WO HCPCS SELF ADMINISTERED DRUGS (ALT 637 FOR MEDICARE OP)

## 2023-10-31 PROCEDURE — 85027 COMPLETE CBC AUTOMATED: CPT | Performed by: STUDENT IN AN ORGANIZED HEALTH CARE EDUCATION/TRAINING PROGRAM

## 2023-10-31 PROCEDURE — 59025 FETAL NON-STRESS TEST: CPT | Mod: GC | Performed by: OBSTETRICS & GYNECOLOGY

## 2023-10-31 PROCEDURE — 1210000001 HC SEMI-PRIVATE ROOM DAILY

## 2023-10-31 PROCEDURE — 86900 BLOOD TYPING SEROLOGIC ABO: CPT | Performed by: STUDENT IN AN ORGANIZED HEALTH CARE EDUCATION/TRAINING PROGRAM

## 2023-10-31 PROCEDURE — 59025 FETAL NON-STRESS TEST: CPT | Performed by: OBSTETRICS & GYNECOLOGY

## 2023-10-31 PROCEDURE — 82947 ASSAY GLUCOSE BLOOD QUANT: CPT

## 2023-10-31 PROCEDURE — 96372 THER/PROPH/DIAG INJ SC/IM: CPT | Performed by: STUDENT IN AN ORGANIZED HEALTH CARE EDUCATION/TRAINING PROGRAM

## 2023-10-31 PROCEDURE — 2500000001 HC RX 250 WO HCPCS SELF ADMINISTERED DRUGS (ALT 637 FOR MEDICARE OP): Performed by: STUDENT IN AN ORGANIZED HEALTH CARE EDUCATION/TRAINING PROGRAM

## 2023-10-31 PROCEDURE — 80053 COMPREHEN METABOLIC PANEL: CPT | Performed by: STUDENT IN AN ORGANIZED HEALTH CARE EDUCATION/TRAINING PROGRAM

## 2023-10-31 PROCEDURE — 99232 SBSQ HOSP IP/OBS MODERATE 35: CPT | Performed by: OBSTETRICS & GYNECOLOGY

## 2023-10-31 PROCEDURE — 2500000004 HC RX 250 GENERAL PHARMACY W/ HCPCS (ALT 636 FOR OP/ED): Performed by: STUDENT IN AN ORGANIZED HEALTH CARE EDUCATION/TRAINING PROGRAM

## 2023-10-31 RX ORDER — FAMOTIDINE 20 MG/1
20 TABLET, FILM COATED ORAL 2 TIMES DAILY
Status: DISCONTINUED | OUTPATIENT
Start: 2023-10-31 | End: 2023-11-03

## 2023-10-31 RX ADMIN — INSULIN GLARGINE 34 UNITS: 100 INJECTION, SOLUTION SUBCUTANEOUS at 20:52

## 2023-10-31 RX ADMIN — ENOXAPARIN SODIUM 40 MG: 100 INJECTION SUBCUTANEOUS at 08:23

## 2023-10-31 RX ADMIN — NIFEDIPINE 60 MG: 60 TABLET, EXTENDED RELEASE ORAL at 20:40

## 2023-10-31 RX ADMIN — ASPIRIN 81 MG CHEWABLE TABLET 162 MG: 81 TABLET CHEWABLE at 08:22

## 2023-10-31 RX ADMIN — INSULIN GLARGINE 22 UNITS: 100 INJECTION, SOLUTION SUBCUTANEOUS at 07:38

## 2023-10-31 RX ADMIN — LABETALOL HYDROCHLORIDE 600 MG: 100 TABLET, FILM COATED ORAL at 08:22

## 2023-10-31 RX ADMIN — INSULIN LISPRO 24 UNITS: 100 INJECTION, SOLUTION INTRAVENOUS; SUBCUTANEOUS at 19:22

## 2023-10-31 RX ADMIN — PRENATAL VIT W/ FE FUMARATE-FA TAB 27-0.8 MG 1 TABLET: 27-0.8 TAB at 08:22

## 2023-10-31 RX ADMIN — INSULIN LISPRO 18 UNITS: 100 INJECTION, SOLUTION INTRAVENOUS; SUBCUTANEOUS at 13:06

## 2023-10-31 RX ADMIN — INSULIN LISPRO 10 UNITS: 100 INJECTION, SOLUTION INTRAVENOUS; SUBCUTANEOUS at 09:21

## 2023-10-31 RX ADMIN — LABETALOL HYDROCHLORIDE 200 MG: 100 TABLET, FILM COATED ORAL at 00:02

## 2023-10-31 RX ADMIN — Medication 1000 UNITS: at 08:22

## 2023-10-31 RX ADMIN — FAMOTIDINE 20 MG: 20 TABLET ORAL at 22:04

## 2023-10-31 RX ADMIN — LABETALOL HYDROCHLORIDE 600 MG: 100 TABLET, FILM COATED ORAL at 20:34

## 2023-10-31 RX ADMIN — CEPHALEXIN 250 MG: 250 CAPSULE ORAL at 13:03

## 2023-10-31 ASSESSMENT — PAIN SCALES - GENERAL
PAINLEVEL_OUTOF10: 0 - NO PAIN

## 2023-10-31 ASSESSMENT — PAIN - FUNCTIONAL ASSESSMENT
PAIN_FUNCTIONAL_ASSESSMENT: 0-10
PAIN_FUNCTIONAL_ASSESSMENT: 0-10

## 2023-10-31 NOTE — PROGRESS NOTES
Antepartum Progress Note    Assessment/Plan   Gabrielle David is a 38 y.o.  at 30w5d. MANUEL: [birth year]24 who is admitted for siPEC w/SF.     CHTN w/ siPEC w/ SF  - Dx by severe range BPs requiring IV treatment  - IV labetalol 20/40 on admission  - HELLP labs neg to date, continue twice weekly labs  - asymptomatic  - Current BP Regimen: Nifed 60 BID + Lab 400 BID (10/22), uptitrated overnight to 600mg BID  - Anticipate admission until delivery at 34wga pending clinical status     FGR   - Last growth 10/25 EFW 1264g (9%), AC 13%  - Elevated UA dopplers, PI >99%ile 10/30  - for twice weekly BPP/dopplers (mon/thur)     APLS   - Continue lovenox ppx 40 mg SC daily, for nurses to ask prior to giving just in case delivery is more imminent  - Continue  mg     A2DM   - A1c 5.1%   - Pregnancy regimen prior to admission: Basaglar  and Lispro 10/18/24  - Regimen increased in the setting of BMZ, insulin requirements now decreasing,   -Current regimen adjusted 10/30 to: Glargine  + Lispro 10/18/24  - Continue fasting and 1hr pp Bgs    Recurrent UTI   - Continue daily Keflex suppression     IUP  - Daily NST  - s/p Tdap  - s/p 12hrs Mg GTT for fetal neuroprotection  - GBS POS 10/21  - Prior Classical C/S, For repeat CS x 3 if headed toward delivery  - s/p NICU cs      Dispo: continue inpatient management      To be discussed with Dr. Jude Story, PGY3  PGY-3, Obstetrics and Gynecology  Maternal Fetal Medicine, pager: [phone]    Principal Problem:    Chronic hypertension with superimposed preeclampsia  Active Problems:    History of  section, classical    Insulin controlled gestational diabetes mellitus (GDM) in third trimester    History of IUFD    Antiphospholipid antibody syndrome complicating pregnancy (CMS/HCC)    Recurrent urinary tract infection affecting pregnancy in second trimester    Poor fetal growth affecting management of mother in third trimester    Group B streptococcal infection  during pregnancy    Subjective   Feeling like BG is a little low this AM. Denies HA, change in vision, SOB, CP and RUQ pain. Denies ctx, VB, LOF. Feels good fetal movement.     Objective   Allergies:   Patient has no known allergies.    Last Vitals:  Temp Pulse Resp BP MAP Pulse Ox   37.2 °C (99 °F) 78 18 (!) 141/79   97 %     Vitals Min/Max Last 24 Hours:  Temp  Min: 36.6 °C (97.9 °F)  Max: 37.2 °C (99 °F)  Pulse  Min: 72  Max: 84  Resp  Min: 17  Max: 18  BP  Min: 132/82  Max: 161/86    Intake/Output:   No intake or output data in the 24 hours ending 10/31/23 0744    Physical Exam:  General: Well appearing, alert  Cardio: Warm and well perfused  Resp: breathing comfortably on room air  Abd: gravid    Fetal Assessment  FHT: 130/mod/+accel/-decel  [place]: quiet  Interpretation: AGA

## 2023-10-31 NOTE — CARE PLAN
The patient's goals for the shift include normal BPs    The clinical goals for the shift include BPs WNL    Problem: Diabetes  Goal: Achieve decreasing blood glucose levels by end of shift  Outcome: Progressing     Problem: Antepartum  Goal: FHR remains reassuring  Outcome: Progressing     Problem: Antepartum  Goal: No decrease in circulation/VTE  Outcome: Progressing     Problem: Hypertensive Disorder of Pregnancy (HDP)  Goal: Adequate urine output (0.5 ml/kg/hr)  Outcome: Progressing     Problem: Safety - Adult  Goal: Free from fall injury  Outcome: Progressing

## 2023-11-01 ENCOUNTER — ANESTHESIA EVENT (OUTPATIENT)
Dept: OBSTETRICS AND GYNECOLOGY | Facility: HOSPITAL | Age: 38
End: 2023-11-01
Payer: OTHER GOVERNMENT

## 2023-11-01 PROBLEM — K21.9 GASTROESOPHAGEAL REFLUX DISEASE WITHOUT ESOPHAGITIS: Status: ACTIVE | Noted: 2023-11-01

## 2023-11-01 LAB
ALBUMIN SERPL BCP-MCNC: 3 G/DL (ref 3.4–5)
ALP SERPL-CCNC: 88 U/L (ref 33–110)
ALT SERPL W P-5'-P-CCNC: 11 U/L (ref 7–45)
ANION GAP SERPL CALC-SCNC: 13 MMOL/L (ref 10–20)
AST SERPL W P-5'-P-CCNC: 11 U/L (ref 9–39)
BILIRUB SERPL-MCNC: 0.5 MG/DL (ref 0–1.2)
BUN SERPL-MCNC: 13 MG/DL (ref 6–23)
CALCIUM SERPL-MCNC: 9.2 MG/DL (ref 8.6–10.6)
CHLORIDE SERPL-SCNC: 107 MMOL/L (ref 98–107)
CO2 SERPL-SCNC: 22 MMOL/L (ref 21–32)
CREAT SERPL-MCNC: 0.72 MG/DL (ref 0.5–1.05)
ERYTHROCYTE [DISTWIDTH] IN BLOOD BY AUTOMATED COUNT: 14.4 % (ref 11.5–14.5)
GFR SERPL CREATININE-BSD FRML MDRD: >90 ML/MIN/1.73M*2
GLUCOSE BLD MANUAL STRIP-MCNC: 82 MG/DL (ref 74–99)
GLUCOSE BLD MANUAL STRIP-MCNC: 92 MG/DL (ref 74–99)
GLUCOSE SERPL-MCNC: 68 MG/DL (ref 74–99)
HCT VFR BLD AUTO: 37.3 % (ref 36–46)
HGB BLD-MCNC: 12.8 G/DL (ref 12–16)
MCH RBC QN AUTO: 32 PG (ref 26–34)
MCHC RBC AUTO-ENTMCNC: 34.3 G/DL (ref 32–36)
MCV RBC AUTO: 93 FL (ref 80–100)
NRBC BLD-RTO: 0 /100 WBCS (ref 0–0)
PLATELET # BLD AUTO: 260 X10*3/UL (ref 150–450)
PMV BLD AUTO: 9.8 FL (ref 7.5–11.5)
POTASSIUM SERPL-SCNC: 4.3 MMOL/L (ref 3.5–5.3)
PROT SERPL-MCNC: 6 G/DL (ref 6.4–8.2)
RBC # BLD AUTO: 4 X10*6/UL (ref 4–5.2)
SODIUM SERPL-SCNC: 138 MMOL/L (ref 136–145)
WBC # BLD AUTO: 8.4 X10*3/UL (ref 4.4–11.3)

## 2023-11-01 PROCEDURE — 2500000004 HC RX 250 GENERAL PHARMACY W/ HCPCS (ALT 636 FOR OP/ED): Performed by: STUDENT IN AN ORGANIZED HEALTH CARE EDUCATION/TRAINING PROGRAM

## 2023-11-01 PROCEDURE — 36415 COLL VENOUS BLD VENIPUNCTURE: CPT | Performed by: STUDENT IN AN ORGANIZED HEALTH CARE EDUCATION/TRAINING PROGRAM

## 2023-11-01 PROCEDURE — 2500000001 HC RX 250 WO HCPCS SELF ADMINISTERED DRUGS (ALT 637 FOR MEDICARE OP)

## 2023-11-01 PROCEDURE — 2500000001 HC RX 250 WO HCPCS SELF ADMINISTERED DRUGS (ALT 637 FOR MEDICARE OP): Performed by: STUDENT IN AN ORGANIZED HEALTH CARE EDUCATION/TRAINING PROGRAM

## 2023-11-01 PROCEDURE — 99233 SBSQ HOSP IP/OBS HIGH 50: CPT | Performed by: STUDENT IN AN ORGANIZED HEALTH CARE EDUCATION/TRAINING PROGRAM

## 2023-11-01 PROCEDURE — 2500000002 HC RX 250 W HCPCS SELF ADMINISTERED DRUGS (ALT 637 FOR MEDICARE OP, ALT 636 FOR OP/ED): Performed by: STUDENT IN AN ORGANIZED HEALTH CARE EDUCATION/TRAINING PROGRAM

## 2023-11-01 PROCEDURE — 80053 COMPREHEN METABOLIC PANEL: CPT | Performed by: STUDENT IN AN ORGANIZED HEALTH CARE EDUCATION/TRAINING PROGRAM

## 2023-11-01 PROCEDURE — 85027 COMPLETE CBC AUTOMATED: CPT | Performed by: STUDENT IN AN ORGANIZED HEALTH CARE EDUCATION/TRAINING PROGRAM

## 2023-11-01 PROCEDURE — 82947 ASSAY GLUCOSE BLOOD QUANT: CPT

## 2023-11-01 PROCEDURE — 59025 FETAL NON-STRESS TEST: CPT | Performed by: STUDENT IN AN ORGANIZED HEALTH CARE EDUCATION/TRAINING PROGRAM

## 2023-11-01 PROCEDURE — 1210000001 HC SEMI-PRIVATE ROOM DAILY

## 2023-11-01 PROCEDURE — 59025 FETAL NON-STRESS TEST: CPT | Mod: GC | Performed by: STUDENT IN AN ORGANIZED HEALTH CARE EDUCATION/TRAINING PROGRAM

## 2023-11-01 PROCEDURE — 94760 N-INVAS EAR/PLS OXIMETRY 1: CPT

## 2023-11-01 PROCEDURE — 99233 SBSQ HOSP IP/OBS HIGH 50: CPT | Performed by: OBSTETRICS & GYNECOLOGY

## 2023-11-01 RX ORDER — HYDRALAZINE HYDROCHLORIDE 20 MG/ML
10 INJECTION INTRAMUSCULAR; INTRAVENOUS ONCE
Status: COMPLETED | OUTPATIENT
Start: 2023-11-01 | End: 2023-11-01

## 2023-11-01 RX ORDER — LABETALOL 200 MG/1
600 TABLET, FILM COATED ORAL 3 TIMES DAILY
Status: DISCONTINUED | OUTPATIENT
Start: 2023-11-01 | End: 2023-11-02

## 2023-11-01 RX ORDER — LABETALOL 200 MG/1
600 TABLET, FILM COATED ORAL 3 TIMES DAILY
Status: DISCONTINUED | OUTPATIENT
Start: 2023-11-01 | End: 2023-11-01

## 2023-11-01 RX ORDER — HYDRALAZINE HYDROCHLORIDE 20 MG/ML
5 INJECTION INTRAMUSCULAR; INTRAVENOUS ONCE
Status: COMPLETED | OUTPATIENT
Start: 2023-11-01 | End: 2023-11-03

## 2023-11-01 RX ADMIN — INSULIN GLARGINE 34 UNITS: 100 INJECTION, SOLUTION SUBCUTANEOUS at 21:13

## 2023-11-01 RX ADMIN — LABETALOL HYDROCHLORIDE 600 MG: 200 TABLET, FILM COATED ORAL at 21:11

## 2023-11-01 RX ADMIN — PRENATAL VIT W/ FE FUMARATE-FA TAB 27-0.8 MG 1 TABLET: 27-0.8 TAB at 09:06

## 2023-11-01 RX ADMIN — INSULIN LISPRO 24 UNITS: 100 INJECTION, SOLUTION INTRAVENOUS; SUBCUTANEOUS at 19:46

## 2023-11-01 RX ADMIN — NIFEDIPINE 60 MG: 60 TABLET, EXTENDED RELEASE ORAL at 07:48

## 2023-11-01 RX ADMIN — LABETALOL HYDROCHLORIDE 600 MG: 200 TABLET, FILM COATED ORAL at 15:00

## 2023-11-01 RX ADMIN — INSULIN GLARGINE 22 UNITS: 100 INJECTION, SOLUTION SUBCUTANEOUS at 07:00

## 2023-11-01 RX ADMIN — LABETALOL HYDROCHLORIDE 600 MG: 200 TABLET, FILM COATED ORAL at 07:48

## 2023-11-01 RX ADMIN — FAMOTIDINE 20 MG: 20 TABLET ORAL at 09:05

## 2023-11-01 RX ADMIN — FAMOTIDINE 20 MG: 20 TABLET ORAL at 21:11

## 2023-11-01 RX ADMIN — HYDRALAZINE HYDROCHLORIDE 10 MG: 20 INJECTION INTRAMUSCULAR; INTRAVENOUS at 08:00

## 2023-11-01 RX ADMIN — CEPHALEXIN 250 MG: 250 CAPSULE ORAL at 10:35

## 2023-11-01 RX ADMIN — NIFEDIPINE 60 MG: 60 TABLET, EXTENDED RELEASE ORAL at 21:11

## 2023-11-01 RX ADMIN — INSULIN LISPRO 18 UNITS: 100 INJECTION, SOLUTION INTRAVENOUS; SUBCUTANEOUS at 15:33

## 2023-11-01 RX ADMIN — HYDRALAZINE HYDROCHLORIDE 5 MG: 20 INJECTION INTRAMUSCULAR; INTRAVENOUS at 07:44

## 2023-11-01 RX ADMIN — Medication 1000 UNITS: at 10:34

## 2023-11-01 ASSESSMENT — PAIN SCALES - GENERAL
PAINLEVEL_OUTOF10: 0 - NO PAIN

## 2023-11-01 ASSESSMENT — PAIN - FUNCTIONAL ASSESSMENT
PAIN_FUNCTIONAL_ASSESSMENT: 0-10
PAIN_FUNCTIONAL_ASSESSMENT: 0-10

## 2023-11-01 NOTE — PROGRESS NOTES
Antepartum Progress Note    Assessment/Plan   Gabrielle David is a 38 y.o.  at 30w6d. MANUEL: 2024, by Last Menstrual Period. Here for cHTN w/ siPEC w/ SF     ASSESSMENT AND PLAN:      38 y.o.  at 30w6d with siPEC w/SF.      CHTN w/ siPEC w/ SF  - Dx by severe range BPs requiring IV treatment  - IV labetalol  on admission, hydral 5 IV now ( 0745) for sustained severe range BPs  - HELLP labs neg to date, continue twice weekly labs, will draw set this am given change in BP and variables on NST this am  - asymptomatic  - Current BP Regimen: Nifed 60 BID + Labetalol 600mg BID (10/31) will increase Labetalol 600 mg TID () this am as patient with severe blood pressures  - Anticipate admission until delivery at 34wga pending clinical status     FGR   - Last growth 10/25 EFW 1264g (9%), AC 13%  - Elevated UA dopplers, PI >99%ile 10/30  - for twice weekly BPP/dopplers (mon/thur)  - prolonged monitoring on L&D in setting of variables, and decreased FM per patient      APLS   - Continue lovenox ppx 40 mg SC daily, for nurses to ask prior to giving just in case delivery is more imminent, will hold this AM pending resolution of BPs  - Continue  mg     A2DM   - A1c 5.1%   - Pregnancy regimen prior to admission: Basaglar  and Lispro 10/18/24  - Regimen increased in the setting of BMZ, insulin requirements now decreasing,   - Glargine  + Lispro 10/18/24  - Continue fasting and 1hr pp Bgs, while NPO this am will do Q4H POCTs     Recurrent UTI   - Continue daily Keflex suppression     Fetal status: continue daily NSTs while inpatient   -- Ultrasound: EFW 1264g (9%) on 10/25, cephalic    -- S/p BMZx2 on 10/20-   -- S/p NICU consult      Routine:  -- GBS pos  -- s/p TDAP  - Prior Classical C/S, For repeat CS x 3 if headed toward delivery     Reviewed and approved by BETZAIDA MCMULLEN on 23 at 7:44 AM.        Principal Problem:    Chronic hypertension with superimposed preeclampsia  Active  Problems:    History of  section, classical    Insulin controlled gestational diabetes mellitus (GDM) in third trimester    History of IUFD    Antiphospholipid antibody syndrome complicating pregnancy (CMS/HCC)    Recurrent urinary tract infection affecting pregnancy in second trimester    Poor fetal growth affecting management of mother in third trimester    Group B streptococcal infection during pregnancy    Pregnancy Problems (from 23 to present)       Problem Noted Resolved    Group B streptococcal infection during pregnancy 10/30/2023 by Abdirashid Roque MD No    Priority:  Medium      Chronic hypertension with superimposed preeclampsia 10/20/2023 by Santos Booth MD No    Priority:  Medium      Antiphospholipid antibody syndrome complicating pregnancy (CMS/HCC) 10/5/2023 by Mariza Bell APRN-CNP No    Priority:  Medium      Overview Signed 10/20/2023  6:57 PM by Santos Booth MD     Currently on Lovenox 40mg daily.  bASA 162mg daily         Recurrent urinary tract infection affecting pregnancy in second trimester 10/5/2023 by Mariza Bell APRN-CNP No    Priority:  Medium      Overview Signed 10/20/2023  6:57 PM by Santos Booth MD     On ppx Macrobid abx this pregnancy         Poor fetal growth affecting management of mother in third trimester 10/5/2023 by Mariza Bell, APRN-CNP No    Priority:  Medium      Overview Addendum 10/20/2023  6:56 PM by Santos Booth MD     H/O FGR in prior pregnancy   Last Growth  10/6 EFW 940g 17% AC 20%         Multigravida of advanced maternal age in second trimester 10/5/2023 by Mariza Bell, APRN-CNP No    Priority:  Medium      Overview Signed 10/20/2023  6:57 PM by Santos Booth MD     Declined cfDNA this pregnancy.         History of  section, classical 2023 by Stevo Low No    Priority:  Medium      Overview Signed 10/20/2023  6:52 PM by Santos Booth MD     For Repeat  if headed toward  delivery.         Insulin controlled gestational diabetes mellitus (GDM) in third trimester 9/25/2023 by Stevo Low No    Priority:  Medium      Overview Signed 10/20/2023  6:54 PM by Santos Booth MD     Current Regimen: Glargine 18/28, Lispro 10/18/24           History of IUFD 9/25/2023 by RYAN Hull No    Priority:  Medium      Overview Signed 10/5/2023 11:48 AM by RYAN Hull     IUFD while undergoing surveillance inpatient in first pregnancy                 Subjective   Patient denies any HA, sob, cp, scotoma, RUQ pain. No VB, LOF, Ctx, endorses some slight decrease in FM overnight and this am    Objective   Allergies:   Patient has no known allergies.    Last Vitals:  Temp Pulse Resp BP MAP Pulse Ox   36.7 °C (98.1 °F) 67 19 (!) 162/80   96 %     Vitals Min/Max Last 24 Hours:  Temp  Min: 36.6 °C (97.9 °F)  Max: 37 °C (98.6 °F)  Pulse  Min: 60  Max: 81  Resp  Min: 17  Max: 20  BP  Min: 135/81  Max: 177/86    Intake/Output:   No intake or output data in the 24 hours ending 11/01/23 0741    Physical Exam:  GENERAL: Examination reveals a well developed, well nourished, gravid female in no acute distress. She is alert and cooperative.  HEENT: PERRLA. External ears normal. Nose normal, no erythema or discharge. Mouth and throat clear.  LUNGS: clear to auscultation bilaterally  HEART: regular rate and rhythm, S1, S2 normal, no murmur, click, rub or gallop  ABDOMEN: soft, gravid, nontender, nondistended, no abnormal masses, no epigastric pain  FHR is  135 moderate variability, occasional variable decelerations, no accels  [place] reading:  quiet  EXTREMITIES: no redness or tenderness in the calves or thighs, no edema  SKIN: normal coloration and turgor, no rashes  NEUROLOGICAL: alert, oriented, normal speech, no focal findings or movement disorder noted  PSYCHOLOGICAL: awake and alert; oriented to person, place, and time    Lab Data:  Labs in chart were reviewed.

## 2023-11-01 NOTE — PROGRESS NOTES
"ANTEPARTUM PROGRESS NOTE   2023, 6:39 AM     SUBJECTIVE:  No acute events overnight. Patient has no complaints this morning. Denies painful contractions, VB, LOF. Reports normal fetal movement. Denies HA, vision changes, CP, SOB, RUQ or epigastric pain.    OBJECTIVE:   BP (!) 152/83   Pulse 71   Temp 36.7 °C (98.1 °F) (Temporal)   Resp 18   Ht 1.575 m (5' 2\")   Wt 89.7 kg (197 lb 12 oz)   LMP 2023   SpO2 97%   BMI 36.17 kg/m²      General:  AAOx3, No acute distress  Cardiovascular: Warm and well perfused  Respiratory:  No increased inspiratory effort  Abdominal:  Soft, gravid, tender, no rebound or guarding  Extremities:  Warm, well perfused    NST: Baseline 130, accelerations +, small variable decelerations, mod variability   [place]: quiet     Labs:   Lab Results   Component Value Date    WBC 8.4 2023    HGB 12.8 2023    HCT 37.3 2023     2023     Lab Results   Component Value Date    GLUCOSE 79 10/31/2023     10/31/2023    K 4.1 10/31/2023     10/31/2023    CO2 20 (L) 10/31/2023    ANIONGAP 15 10/31/2023    BUN 16 10/31/2023    CREATININE 0.62 10/31/2023    EGFR >90 10/31/2023    CALCIUM 9.0 10/31/2023    ALBUMIN 3.0 (L) 10/31/2023    PROT 5.8 (L) 10/31/2023    ALKPHOS 83 10/31/2023    ALT 11 10/31/2023    AST 13 10/31/2023    BILITOT 0.4 10/31/2023       ASSESSMENT AND PLAN:     38 y.o.  at 30w6d with siPEC w/SF and sFGR w/ abnormal Dopplers.     CHTN w/ siPEC w/ SF  - Dx by severe range BPs requiring IV treatment  - IV labetalol 20/40 on admission  - HELLP labs neg to date, continue twice weekly labs  - asymptomatic  - Current BP Regimen: Nifed 60 BID + 600mg BID (10/31)  - Anticipate admission until delivery at 34wga pending clinical status     FGR   - Last growth 10/25 EFW 1264g (9%), AC 13%  - Elevated UA dopplers, PI >99%ile 10/30  - for twice weekly BPP/dopplers (mon/thur)     APLS   - Continue lovenox ppx 40 mg SC daily, for nurses to ask " prior to giving just in case delivery is more imminent  - Continue  mg     A2DM   - A1c 5.1%   - Pregnancy regimen prior to admission: Basaglar  and Lispro 10/18/24  - Regimen increased in the setting of BMZ, insulin requirements now decreasing,   - Glargine  + Lispro 10/18/24  - Continue fasting and 1hr pp Bgs     Recurrent UTI   - Continue daily Keflex suppression    Fetal status: continue daily NSTs while inpatient   -- Ultrasound: EFW 1264g (9%) on 10/25, cephalic    -- S/p BMZx2 on 10/20-   -- S/p NICU consult     Routine:  -- GBS pos  -- s/p TDAP  - Prior Classical C/S, For repeat CS x 3 if headed toward delivery    Dispo: To L&D for prolonged monitoring in s/o multiple variables on AM NST. Hold lovenox this AM.    Pt seen and discussed with MFM Attending, Dr. Roque.     Freida Dave MD     Principal Problem:    Chronic hypertension with superimposed preeclampsia  Active Problems:    History of  section, classical    Insulin controlled gestational diabetes mellitus (GDM) in third trimester    History of IUFD    Antiphospholipid antibody syndrome complicating pregnancy (CMS/HCC)    Recurrent urinary tract infection affecting pregnancy in second trimester    Poor fetal growth affecting management of mother in third trimester    Group B streptococcal infection during pregnancy

## 2023-11-01 NOTE — NURSING NOTE
MFM Team @ Pt bedside; discussing plan of care and doing bedside ultrasound.  FHR monitors removed.

## 2023-11-01 NOTE — ANESTHESIA PREPROCEDURE EVALUATION
Patient: Gabrielle David    Evaluation Method: In-person visit    Procedure Information    Date: 23  Procedure: Labor Analgesia         Relevant Problems   Anesthesia (within normal limits)  Per patient , difficult placement of epidural last time . Anesthesia records on file didn't document difficulty       Cardiovascular   (+) Chronic hypertension with superimposed preeclampsia      Endocrine   (+) Insulin controlled gestational diabetes mellitus (GDM) in third trimester      GI   (+) Gastroesophageal reflux disease without esophagitis      /Renal   (+) Recurrent urinary tract infection affecting pregnancy in second trimester      Neuro/Psych (within normal limits)      Pulmonary (within normal limits)      GI/Hepatic (within normal limits)      Hematology  Hx of APLS on Lovenox  ( 40 mg subcutaneous once daily ) + ASA in-patient       Musculoskeletal (within normal limits)      Eyes, Ears, Nose, and Throat (within normal limits)      Infectious Disease   (+) Group B streptococcal infection during pregnancy   (+) Recurrent urinary tract infection affecting pregnancy in second trimester       Clinical information reviewed:   Tobacco  Allergies  Meds   Med Hx  Surg Hx   Fam Hx  Soc Hx        NPO Detail:  No data recorded     OB/Gyn Evaluation    Present Pregnancy    Patient is pregnant now.  (+) , previous  section - primary, fetal growth restriction, hypertensive disorder of pregnancy - superimposed preeclampsia with severe features, anticoagulation use during pregnancy   Obstetric History                Physical Exam    Airway  Mallampati: III  TM distance: >3 FB  Neck ROM: full     Cardiovascular - normal exam     Dental    Pulmonary - normal exam     Abdominal            Anesthesia Plan    ASA 3     CSE     Anesthetic plan and risks discussed with patient.  Use of blood products discussed with patient who consented to blood products.    Plan discussed with attending.    Discussed with patient  options for GA in case recent anticoagulation was given .

## 2023-11-01 NOTE — CARE PLAN
The patient's goals for the shift include WNL BP's without titrating up meds    The clinical goals for the shift include maintain BP <160/110

## 2023-11-02 ENCOUNTER — APPOINTMENT (OUTPATIENT)
Dept: RADIOLOGY | Facility: HOSPITAL | Age: 38
End: 2023-11-02
Payer: OTHER GOVERNMENT

## 2023-11-02 LAB
GLUCOSE BLD MANUAL STRIP-MCNC: 112 MG/DL (ref 74–99)
GLUCOSE BLD MANUAL STRIP-MCNC: 131 MG/DL (ref 74–99)
GLUCOSE BLD MANUAL STRIP-MCNC: 80 MG/DL (ref 74–99)
GLUCOSE BLD MANUAL STRIP-MCNC: 91 MG/DL (ref 74–99)

## 2023-11-02 PROCEDURE — 99233 SBSQ HOSP IP/OBS HIGH 50: CPT | Performed by: OBSTETRICS & GYNECOLOGY

## 2023-11-02 PROCEDURE — 2500000001 HC RX 250 WO HCPCS SELF ADMINISTERED DRUGS (ALT 637 FOR MEDICARE OP): Performed by: STUDENT IN AN ORGANIZED HEALTH CARE EDUCATION/TRAINING PROGRAM

## 2023-11-02 PROCEDURE — 76820 UMBILICAL ARTERY ECHO: CPT

## 2023-11-02 PROCEDURE — 76818 FETAL BIOPHYS PROFILE W/NST: CPT

## 2023-11-02 PROCEDURE — 76820 UMBILICAL ARTERY ECHO: CPT | Performed by: OBSTETRICS & GYNECOLOGY

## 2023-11-02 PROCEDURE — 2500000001 HC RX 250 WO HCPCS SELF ADMINISTERED DRUGS (ALT 637 FOR MEDICARE OP)

## 2023-11-02 PROCEDURE — 2500000004 HC RX 250 GENERAL PHARMACY W/ HCPCS (ALT 636 FOR OP/ED)

## 2023-11-02 PROCEDURE — 2500000004 HC RX 250 GENERAL PHARMACY W/ HCPCS (ALT 636 FOR OP/ED): Performed by: STUDENT IN AN ORGANIZED HEALTH CARE EDUCATION/TRAINING PROGRAM

## 2023-11-02 PROCEDURE — 76818 FETAL BIOPHYS PROFILE W/NST: CPT | Performed by: OBSTETRICS & GYNECOLOGY

## 2023-11-02 PROCEDURE — 82947 ASSAY GLUCOSE BLOOD QUANT: CPT

## 2023-11-02 PROCEDURE — 96372 THER/PROPH/DIAG INJ SC/IM: CPT | Performed by: STUDENT IN AN ORGANIZED HEALTH CARE EDUCATION/TRAINING PROGRAM

## 2023-11-02 PROCEDURE — 1210000001 HC SEMI-PRIVATE ROOM DAILY

## 2023-11-02 RX ORDER — LABETALOL 100 MG/1
200 TABLET, FILM COATED ORAL ONCE
Status: COMPLETED | OUTPATIENT
Start: 2023-11-02 | End: 2023-11-02

## 2023-11-02 RX ORDER — LORATADINE 10 MG/1
10 TABLET ORAL DAILY
Status: DISCONTINUED | OUTPATIENT
Start: 2023-11-02 | End: 2023-11-03

## 2023-11-02 RX ORDER — LABETALOL 200 MG/1
800 TABLET, FILM COATED ORAL EVERY 8 HOURS
Status: DISCONTINUED | OUTPATIENT
Start: 2023-11-02 | End: 2023-11-07 | Stop reason: HOSPADM

## 2023-11-02 RX ADMIN — INSULIN LISPRO 18 UNITS: 100 INJECTION, SOLUTION INTRAVENOUS; SUBCUTANEOUS at 12:33

## 2023-11-02 RX ADMIN — LABETALOL HYDROCHLORIDE 600 MG: 200 TABLET, FILM COATED ORAL at 08:31

## 2023-11-02 RX ADMIN — PRENATAL VIT W/ FE FUMARATE-FA TAB 27-0.8 MG 1 TABLET: 27-0.8 TAB at 08:35

## 2023-11-02 RX ADMIN — INSULIN LISPRO 24 UNITS: 100 INJECTION, SOLUTION INTRAVENOUS; SUBCUTANEOUS at 18:44

## 2023-11-02 RX ADMIN — INSULIN GLARGINE 34 UNITS: 100 INJECTION, SOLUTION SUBCUTANEOUS at 20:34

## 2023-11-02 RX ADMIN — ENOXAPARIN SODIUM 40 MG: 100 INJECTION SUBCUTANEOUS at 14:07

## 2023-11-02 RX ADMIN — NIFEDIPINE 60 MG: 60 TABLET, EXTENDED RELEASE ORAL at 08:31

## 2023-11-02 RX ADMIN — Medication 1000 UNITS: at 08:31

## 2023-11-02 RX ADMIN — LABETALOL HYDROCHLORIDE 800 MG: 200 TABLET, FILM COATED ORAL at 20:34

## 2023-11-02 RX ADMIN — CEPHALEXIN 250 MG: 250 CAPSULE ORAL at 08:31

## 2023-11-02 RX ADMIN — LORATADINE 10 MG: 10 TABLET ORAL at 11:12

## 2023-11-02 RX ADMIN — NIFEDIPINE 60 MG: 60 TABLET, EXTENDED RELEASE ORAL at 20:34

## 2023-11-02 RX ADMIN — INSULIN GLARGINE 22 UNITS: 100 INJECTION, SOLUTION SUBCUTANEOUS at 08:32

## 2023-11-02 RX ADMIN — FAMOTIDINE 20 MG: 20 TABLET ORAL at 08:31

## 2023-11-02 RX ADMIN — FAMOTIDINE 20 MG: 20 TABLET ORAL at 20:34

## 2023-11-02 RX ADMIN — INSULIN LISPRO 10 UNITS: 100 INJECTION, SOLUTION INTRAVENOUS; SUBCUTANEOUS at 08:33

## 2023-11-02 RX ADMIN — LABETALOL HYDROCHLORIDE 200 MG: 100 TABLET, FILM COATED ORAL at 08:53

## 2023-11-02 ASSESSMENT — PAIN SCALES - GENERAL
PAINLEVEL_OUTOF10: 0 - NO PAIN
PAINLEVEL_OUTOF10: 0 - NO PAIN

## 2023-11-02 ASSESSMENT — PAIN - FUNCTIONAL ASSESSMENT
PAIN_FUNCTIONAL_ASSESSMENT: 0-10
PAIN_FUNCTIONAL_ASSESSMENT: 0-10

## 2023-11-02 NOTE — CARE PLAN
VSS t/o shift. No s/sx sipec. No ctx, cramping, bleeding, or LOF. FHR WNL during spot checks.      Problem: Diabetes  Goal: Achieve decreasing blood glucose levels by end of shift  Outcome: Progressing  Goal: Increase stability of blood glucose readings by end of shift  Outcome: Progressing     Problem: Antepartum  Goal: Maintain pregnancy as long as maternal and/or fetal condition is stable  Outcome: Progressing  Goal: Avoid/minimize constipation  Outcome: Progressing  Goal: No decrease in circulation/VTE  Outcome: Progressing  Goal: FHR remains reassuring  Outcome: Progressing  Goal: Minimize anxiety/maximize coping  Outcome: Progressing     Problem: Hypertensive Disorder of Pregnancy (HDP)  Goal: Minimal s/sx of HDP and BP<160/110  Outcome: Progressing  Goal: Adequate urine output (0.5 ml/kg/hr)  Outcome: Progressing     Problem: Pain - Adult  Goal: Verbalizes/displays adequate comfort level or baseline comfort level  Outcome: Progressing     Problem: Safety - Adult  Goal: Free from fall injury  Outcome: Progressing

## 2023-11-02 NOTE — PROGRESS NOTES
"ANTEPARTUM PROGRESS NOTE   2023, 7:33 AM     SUBJECTIVE: No acute events overnight. Patient has no complaints this morning. Denies painful contractions, VB, LOF. Reports normal fetal movement. Denies HA, vision changes, CP, SOB, RUQ or epigastric pain    OBJECTIVE:   /78 (Patient Position: Lying)   Pulse 74   Temp 37.2 °C (99 °F) (Temporal)   Resp 18   Ht 1.575 m (5' 2\")   Wt 89.7 kg (197 lb 12 oz)   LMP 2023   SpO2 96%   BMI 36.17 kg/m²    Temp  Min: 36.5 °C (97.7 °F)  Max: 37.2 °C (99 °F)  Pulse  Min: 61  Max: 96  BP  Min: 112/64  Max: 172/89    General:  AAOx3, No acute distress  Cardiovascular: Warm and well perfused  Respiratory:  No increased inspiratory effort  Abdominal:  Soft, gravid, tender, no rebound or guarding  Extremities:  Warm, well perfused     NST: Baseline 130, accelerations +, no decelerations, mod variability   [place]: quiet    Labs:   Lab Results   Component Value Date    WBC 8.4 2023    HGB 12.8 2023    HCT 37.3 2023     2023     Lab Results   Component Value Date    GLUCOSE 68 (L) 2023     2023    K 4.3 2023     2023    CO2 22 2023    ANIONGAP 13 2023    BUN 13 2023    CREATININE 0.72 2023    EGFR >90 2023    CALCIUM 9.2 2023    ALBUMIN 3.0 (L) 2023    PROT 6.0 (L) 2023    ALKPHOS 88 2023    ALT 11 2023    AST 11 2023    BILITOT 0.5 2023       ASSESSMENT AND PLAN:     38 y.o.  at 31w0d with siPEC w/SF and sFGR w/ abnormal Dopplers.      CHTN w/ siPEC w/ SF  - Dx by severe range BPs requiring IV treatment  - IV labetalol 20/40 on admission  - HELLP labs neg to date, continue twice weekly labs  - asymptomatic  - Current BP Regimen: Nifed 60 BID + 800mg TID ()  - Anticipate admission until delivery at 34wga pending clinical status     FGR   - Last growth 10/25 EFW 1264g (9%), AC 13%  - Elevated UA dopplers, PI >99%ile " 10/30  - for twice weekly BPP/dopplers (mon/thur)     APLS   - Continue lovenox ppx 40 mg SC daily, for nurses to ask prior to giving just in case delivery is more imminent  - Continue  mg     A2DM   - A1c 5.1%   - Pregnancy regimen prior to admission: Basaglar  and Lispro 10/18/24  - Regimen increased in the setting of BMZ, insulin requirements now decreasing,   - Glargine  + Lispro 10/18/24  - Continue fasting and 1hr pp Bgs     Recurrent UTI   - Continue daily Keflex suppression     Fetal status: continue daily NSTs while inpatient   - Ultrasound: EFW 1264g (9%) on 10/25, cephalic    - S/p BMZx2 on 10/20-   - S/p NICU consult      Routine:  - GBS pos  - s/p TDAP  - Prior Classical C/S, For repeat CS x 3 if headed toward delivery     Dispo: Continue inpatient monitoring in s/o siPEC w SF, FGR, and A2DM. Anticipate delivery at 34.0 wga pending maternal and fetal stability.     Pt seen and discussed with M Attending, Dr. Roque.      Freida Dave MD, PGY-3   [facility]  Pager [phone]     Principal Problem:    Chronic hypertension with superimposed preeclampsia  Active Problems:    History of  section, classical    Insulin controlled gestational diabetes mellitus (GDM) in third trimester    History of IUFD    Antiphospholipid antibody syndrome complicating pregnancy (CMS/HCC)    Recurrent urinary tract infection affecting pregnancy in second trimester    Poor fetal growth affecting management of mother in third trimester    Group B streptococcal infection during pregnancy    Gastroesophageal reflux disease without esophagitis

## 2023-11-02 NOTE — CARE PLAN
The patient's goals for the shift include BP will be normal and stuffy nose will go away    The clinical goals for the shift include BP will remain below 160/110 this shift    Patient had mild range BP this shift, Mds made aware. BP meds were given per order. Patient received a medication for her allergies. FHR WNL this shift. No HA reported. Lovenox was given in afternoon due to Mds requesting it being held this AM. Patient stable at this time.

## 2023-11-03 LAB
ABO GROUP (TYPE) IN BLOOD: NORMAL
ALBUMIN SERPL BCP-MCNC: 3 G/DL (ref 3.4–5)
ALP SERPL-CCNC: 86 U/L (ref 33–110)
ALT SERPL W P-5'-P-CCNC: 13 U/L (ref 7–45)
ANION GAP SERPL CALC-SCNC: 14 MMOL/L (ref 10–20)
ANTIBODY SCREEN: NORMAL
AST SERPL W P-5'-P-CCNC: 17 U/L (ref 9–39)
BILIRUB SERPL-MCNC: 0.4 MG/DL (ref 0–1.2)
BUN SERPL-MCNC: 13 MG/DL (ref 6–23)
CALCIUM SERPL-MCNC: 8.4 MG/DL (ref 8.6–10.6)
CHLORIDE SERPL-SCNC: 107 MMOL/L (ref 98–107)
CO2 SERPL-SCNC: 21 MMOL/L (ref 21–32)
CREAT SERPL-MCNC: 0.7 MG/DL (ref 0.5–1.05)
ERYTHROCYTE [DISTWIDTH] IN BLOOD BY AUTOMATED COUNT: 14.5 % (ref 11.5–14.5)
GFR SERPL CREATININE-BSD FRML MDRD: >90 ML/MIN/1.73M*2
GLUCOSE BLD MANUAL STRIP-MCNC: 67 MG/DL (ref 74–99)
GLUCOSE BLD MANUAL STRIP-MCNC: 90 MG/DL (ref 74–99)
GLUCOSE SERPL-MCNC: 77 MG/DL (ref 74–99)
HCT VFR BLD AUTO: 35.4 % (ref 36–46)
HGB BLD-MCNC: 12 G/DL (ref 12–16)
MCH RBC QN AUTO: 32.6 PG (ref 26–34)
MCHC RBC AUTO-ENTMCNC: 33.9 G/DL (ref 32–36)
MCV RBC AUTO: 96 FL (ref 80–100)
NRBC BLD-RTO: 0 /100 WBCS (ref 0–0)
PLATELET # BLD AUTO: 223 X10*3/UL (ref 150–450)
POTASSIUM SERPL-SCNC: 4.2 MMOL/L (ref 3.5–5.3)
PROT SERPL-MCNC: 5.6 G/DL (ref 6.4–8.2)
RBC # BLD AUTO: 3.68 X10*6/UL (ref 4–5.2)
RH FACTOR (ANTIGEN D): NORMAL
SODIUM SERPL-SCNC: 138 MMOL/L (ref 136–145)
WBC # BLD AUTO: 7.4 X10*3/UL (ref 4.4–11.3)

## 2023-11-03 PROCEDURE — 2500000004 HC RX 250 GENERAL PHARMACY W/ HCPCS (ALT 636 FOR OP/ED): Performed by: STUDENT IN AN ORGANIZED HEALTH CARE EDUCATION/TRAINING PROGRAM

## 2023-11-03 PROCEDURE — 2500000004 HC RX 250 GENERAL PHARMACY W/ HCPCS (ALT 636 FOR OP/ED)

## 2023-11-03 PROCEDURE — 2500000001 HC RX 250 WO HCPCS SELF ADMINISTERED DRUGS (ALT 637 FOR MEDICARE OP)

## 2023-11-03 PROCEDURE — 2500000005 HC RX 250 GENERAL PHARMACY W/O HCPCS

## 2023-11-03 PROCEDURE — 3700000018 HC OB ANESTHESIA C-SECTION: Performed by: OBSTETRICS & GYNECOLOGY

## 2023-11-03 PROCEDURE — 59025 FETAL NON-STRESS TEST: CPT | Performed by: STUDENT IN AN ORGANIZED HEALTH CARE EDUCATION/TRAINING PROGRAM

## 2023-11-03 PROCEDURE — 2500000002 HC RX 250 W HCPCS SELF ADMINISTERED DRUGS (ALT 637 FOR MEDICARE OP, ALT 636 FOR OP/ED): Performed by: STUDENT IN AN ORGANIZED HEALTH CARE EDUCATION/TRAINING PROGRAM

## 2023-11-03 PROCEDURE — 59025 FETAL NON-STRESS TEST: CPT | Mod: GC | Performed by: STUDENT IN AN ORGANIZED HEALTH CARE EDUCATION/TRAINING PROGRAM

## 2023-11-03 PROCEDURE — 3700000014 HC AN EPIDURAL BLOCK CHARGE: Performed by: OBSTETRICS & GYNECOLOGY

## 2023-11-03 PROCEDURE — 86901 BLOOD TYPING SEROLOGIC RH(D): CPT | Performed by: STUDENT IN AN ORGANIZED HEALTH CARE EDUCATION/TRAINING PROGRAM

## 2023-11-03 PROCEDURE — 88307 TISSUE EXAM BY PATHOLOGIST: CPT | Performed by: PATHOLOGY

## 2023-11-03 PROCEDURE — 59514 CESAREAN DELIVERY ONLY: CPT

## 2023-11-03 PROCEDURE — 59514 CESAREAN DELIVERY ONLY: CPT | Performed by: OBSTETRICS & GYNECOLOGY

## 2023-11-03 PROCEDURE — 99233 SBSQ HOSP IP/OBS HIGH 50: CPT | Performed by: STUDENT IN AN ORGANIZED HEALTH CARE EDUCATION/TRAINING PROGRAM

## 2023-11-03 PROCEDURE — 99199 UNLISTED SPECIAL SVC PX/RPRT: CPT

## 2023-11-03 PROCEDURE — 86920 COMPATIBILITY TEST SPIN: CPT

## 2023-11-03 PROCEDURE — 7100000016 HC LABOR RECOVERY PER HOUR: Performed by: OBSTETRICS & GYNECOLOGY

## 2023-11-03 PROCEDURE — 85027 COMPLETE CBC AUTOMATED: CPT

## 2023-11-03 PROCEDURE — 80053 COMPREHEN METABOLIC PANEL: CPT

## 2023-11-03 PROCEDURE — 88307 TISSUE EXAM BY PATHOLOGIST: CPT | Mod: TC,SUR

## 2023-11-03 PROCEDURE — 82947 ASSAY GLUCOSE BLOOD QUANT: CPT

## 2023-11-03 PROCEDURE — 1100000001 HC PRIVATE ROOM DAILY

## 2023-11-03 PROCEDURE — 01961 ANES CESAREAN DELIVERY ONLY: CPT | Performed by: STUDENT IN AN ORGANIZED HEALTH CARE EDUCATION/TRAINING PROGRAM

## 2023-11-03 PROCEDURE — 36415 COLL VENOUS BLD VENIPUNCTURE: CPT | Performed by: STUDENT IN AN ORGANIZED HEALTH CARE EDUCATION/TRAINING PROGRAM

## 2023-11-03 RX ORDER — INSULIN LISPRO 100 [IU]/ML
0-10 INJECTION, SOLUTION INTRAVENOUS; SUBCUTANEOUS EVERY 4 HOURS
Status: DISCONTINUED | OUTPATIENT
Start: 2023-11-03 | End: 2023-11-03

## 2023-11-03 RX ORDER — ONDANSETRON 4 MG/1
4 TABLET, FILM COATED ORAL EVERY 6 HOURS PRN
Status: DISCONTINUED | OUTPATIENT
Start: 2023-11-03 | End: 2023-11-07 | Stop reason: HOSPADM

## 2023-11-03 RX ORDER — POLYETHYLENE GLYCOL 3350 17 G/17G
17 POWDER, FOR SOLUTION ORAL 2 TIMES DAILY PRN
Status: DISCONTINUED | OUTPATIENT
Start: 2023-11-03 | End: 2023-11-07 | Stop reason: HOSPADM

## 2023-11-03 RX ORDER — CARBOPROST TROMETHAMINE 250 UG/ML
250 INJECTION, SOLUTION INTRAMUSCULAR ONCE AS NEEDED
Status: DISCONTINUED | OUTPATIENT
Start: 2023-11-03 | End: 2023-11-07 | Stop reason: HOSPADM

## 2023-11-03 RX ORDER — ENOXAPARIN SODIUM 100 MG/ML
40 INJECTION SUBCUTANEOUS EVERY 24 HOURS
Status: DISCONTINUED | OUTPATIENT
Start: 2023-11-04 | End: 2023-11-07 | Stop reason: HOSPADM

## 2023-11-03 RX ORDER — LOPERAMIDE HYDROCHLORIDE 2 MG/1
4 CAPSULE ORAL EVERY 2 HOUR PRN
Status: DISCONTINUED | OUTPATIENT
Start: 2023-11-03 | End: 2023-11-03

## 2023-11-03 RX ORDER — NALBUPHINE HYDROCHLORIDE 10 MG/ML
5 INJECTION, SOLUTION INTRAMUSCULAR; INTRAVENOUS; SUBCUTANEOUS
Status: ACTIVE | OUTPATIENT
Start: 2023-11-03 | End: 2023-11-04

## 2023-11-03 RX ORDER — HYDRALAZINE HYDROCHLORIDE 20 MG/ML
5 INJECTION INTRAMUSCULAR; INTRAVENOUS ONCE AS NEEDED
Status: DISCONTINUED | OUTPATIENT
Start: 2023-11-03 | End: 2023-11-07 | Stop reason: HOSPADM

## 2023-11-03 RX ORDER — OXYTOCIN 10 [USP'U]/ML
10 INJECTION, SOLUTION INTRAMUSCULAR; INTRAVENOUS ONCE AS NEEDED
Status: DISCONTINUED | OUTPATIENT
Start: 2023-11-03 | End: 2023-11-04

## 2023-11-03 RX ORDER — OXYCODONE HYDROCHLORIDE 5 MG/1
5 TABLET ORAL EVERY 4 HOURS PRN
Status: DISCONTINUED | OUTPATIENT
Start: 2023-11-04 | End: 2023-11-07 | Stop reason: HOSPADM

## 2023-11-03 RX ORDER — HYDROMORPHONE HYDROCHLORIDE 1 MG/ML
0.2 INJECTION, SOLUTION INTRAMUSCULAR; INTRAVENOUS; SUBCUTANEOUS EVERY 5 MIN PRN
Status: DISCONTINUED | OUTPATIENT
Start: 2023-11-03 | End: 2023-11-07 | Stop reason: HOSPADM

## 2023-11-03 RX ORDER — OXYTOCIN/0.9 % SODIUM CHLORIDE 30/500 ML
60 PLASTIC BAG, INJECTION (ML) INTRAVENOUS ONCE AS NEEDED
Status: DISCONTINUED | OUTPATIENT
Start: 2023-11-03 | End: 2023-11-04

## 2023-11-03 RX ORDER — OXYTOCIN 10 [USP'U]/ML
10 INJECTION, SOLUTION INTRAMUSCULAR; INTRAVENOUS ONCE AS NEEDED
Status: DISCONTINUED | OUTPATIENT
Start: 2023-11-03 | End: 2023-11-03

## 2023-11-03 RX ORDER — BUPIVACAINE HYDROCHLORIDE 7.5 MG/ML
INJECTION, SOLUTION INTRASPINAL AS NEEDED
Status: DISCONTINUED | OUTPATIENT
Start: 2023-11-03 | End: 2023-11-03

## 2023-11-03 RX ORDER — TRANEXAMIC ACID 100 MG/ML
1000 INJECTION, SOLUTION INTRAVENOUS ONCE AS NEEDED
Status: DISCONTINUED | OUTPATIENT
Start: 2023-11-03 | End: 2023-11-03

## 2023-11-03 RX ORDER — NIFEDIPINE 10 MG/1
10 CAPSULE ORAL ONCE AS NEEDED
Status: DISCONTINUED | OUTPATIENT
Start: 2023-11-03 | End: 2023-11-07 | Stop reason: HOSPADM

## 2023-11-03 RX ORDER — OXYTOCIN/0.9 % SODIUM CHLORIDE 30/500 ML
60 PLASTIC BAG, INJECTION (ML) INTRAVENOUS ONCE AS NEEDED
Status: DISCONTINUED | OUTPATIENT
Start: 2023-11-03 | End: 2023-11-03

## 2023-11-03 RX ORDER — METOCLOPRAMIDE HYDROCHLORIDE 5 MG/ML
INJECTION INTRAMUSCULAR; INTRAVENOUS
Status: COMPLETED
Start: 2023-11-03 | End: 2023-11-03

## 2023-11-03 RX ORDER — DEXAMETHASONE SODIUM PHOSPHATE 4 MG/ML
INJECTION, SOLUTION INTRA-ARTICULAR; INTRALESIONAL; INTRAMUSCULAR; INTRAVENOUS; SOFT TISSUE AS NEEDED
Status: DISCONTINUED | OUTPATIENT
Start: 2023-11-03 | End: 2023-11-03

## 2023-11-03 RX ORDER — TERBUTALINE SULFATE 1 MG/ML
0.25 INJECTION SUBCUTANEOUS ONCE AS NEEDED
Status: DISCONTINUED | OUTPATIENT
Start: 2023-11-03 | End: 2023-11-03

## 2023-11-03 RX ORDER — DIPHENHYDRAMINE HYDROCHLORIDE 50 MG/ML
25 INJECTION INTRAMUSCULAR; INTRAVENOUS EVERY 4 HOURS PRN
Status: DISCONTINUED | OUTPATIENT
Start: 2023-11-03 | End: 2023-11-07 | Stop reason: HOSPADM

## 2023-11-03 RX ORDER — DEXTROSE 40 %
30 GEL (GRAM) ORAL
Status: DISCONTINUED | OUTPATIENT
Start: 2023-11-03 | End: 2023-11-03

## 2023-11-03 RX ORDER — BISACODYL 10 MG/1
10 SUPPOSITORY RECTAL DAILY PRN
Status: DISCONTINUED | OUTPATIENT
Start: 2023-11-03 | End: 2023-11-07 | Stop reason: HOSPADM

## 2023-11-03 RX ORDER — SODIUM CITRATE AND CITRIC ACID MONOHYDRATE 334; 500 MG/5ML; MG/5ML
SOLUTION ORAL AS NEEDED
Status: DISCONTINUED | OUTPATIENT
Start: 2023-11-03 | End: 2023-11-03

## 2023-11-03 RX ORDER — IBUPROFEN 600 MG/1
600 TABLET ORAL EVERY 6 HOURS
Status: DISCONTINUED | OUTPATIENT
Start: 2023-11-04 | End: 2023-11-07 | Stop reason: HOSPADM

## 2023-11-03 RX ORDER — MAGNESIUM SULFATE HEPTAHYDRATE 40 MG/ML
2 INJECTION, SOLUTION INTRAVENOUS CONTINUOUS
Status: DISCONTINUED | OUTPATIENT
Start: 2023-11-03 | End: 2023-11-04

## 2023-11-03 RX ORDER — ONDANSETRON HYDROCHLORIDE 2 MG/ML
4 INJECTION, SOLUTION INTRAVENOUS EVERY 6 HOURS PRN
Status: DISCONTINUED | OUTPATIENT
Start: 2023-11-03 | End: 2023-11-07 | Stop reason: HOSPADM

## 2023-11-03 RX ORDER — CEFAZOLIN SODIUM 2 G/50ML
SOLUTION INTRAVENOUS AS NEEDED
Status: DISCONTINUED | OUTPATIENT
Start: 2023-11-03 | End: 2023-11-03

## 2023-11-03 RX ORDER — METOCLOPRAMIDE 10 MG/1
10 TABLET ORAL EVERY 6 HOURS PRN
Status: DISCONTINUED | OUTPATIENT
Start: 2023-11-03 | End: 2023-11-03

## 2023-11-03 RX ORDER — HYDRALAZINE HYDROCHLORIDE 20 MG/ML
10 INJECTION INTRAMUSCULAR; INTRAVENOUS ONCE
Status: COMPLETED | OUTPATIENT
Start: 2023-11-03 | End: 2023-11-03

## 2023-11-03 RX ORDER — LABETALOL HYDROCHLORIDE 5 MG/ML
20 INJECTION, SOLUTION INTRAVENOUS ONCE AS NEEDED
Status: DISCONTINUED | OUTPATIENT
Start: 2023-11-03 | End: 2023-11-07 | Stop reason: HOSPADM

## 2023-11-03 RX ORDER — SODIUM CHLORIDE, SODIUM LACTATE, POTASSIUM CHLORIDE, CALCIUM CHLORIDE 600; 310; 30; 20 MG/100ML; MG/100ML; MG/100ML; MG/100ML
125 INJECTION, SOLUTION INTRAVENOUS CONTINUOUS
Status: DISCONTINUED | OUTPATIENT
Start: 2023-11-03 | End: 2023-11-03

## 2023-11-03 RX ORDER — LABETALOL HYDROCHLORIDE 5 MG/ML
20 INJECTION, SOLUTION INTRAVENOUS ONCE AS NEEDED
Status: DISCONTINUED | OUTPATIENT
Start: 2023-11-03 | End: 2023-11-03

## 2023-11-03 RX ORDER — ACETAMINOPHEN 120 MG/1
SUPPOSITORY RECTAL AS NEEDED
Status: DISCONTINUED | OUTPATIENT
Start: 2023-11-03 | End: 2023-11-03

## 2023-11-03 RX ORDER — CEPHALEXIN 500 MG/1
500 CAPSULE ORAL EVERY 8 HOURS SCHEDULED
Status: DISPENSED | OUTPATIENT
Start: 2023-11-03 | End: 2023-11-05

## 2023-11-03 RX ORDER — LIDOCAINE HYDROCHLORIDE 10 MG/ML
30 INJECTION INFILTRATION; PERINEURAL ONCE AS NEEDED
Status: DISCONTINUED | OUTPATIENT
Start: 2023-11-03 | End: 2023-11-03

## 2023-11-03 RX ORDER — FENTANYL CITRATE 50 UG/ML
INJECTION, SOLUTION INTRAMUSCULAR; INTRAVENOUS AS NEEDED
Status: DISCONTINUED | OUTPATIENT
Start: 2023-11-03 | End: 2023-11-03

## 2023-11-03 RX ORDER — METHYLERGONOVINE MALEATE 0.2 MG/ML
0.2 INJECTION INTRAVENOUS ONCE AS NEEDED
Status: DISCONTINUED | OUTPATIENT
Start: 2023-11-03 | End: 2023-11-07 | Stop reason: HOSPADM

## 2023-11-03 RX ORDER — DIPHENHYDRAMINE HCL 25 MG
25 CAPSULE ORAL EVERY 4 HOURS PRN
Status: DISCONTINUED | OUTPATIENT
Start: 2023-11-03 | End: 2023-11-07 | Stop reason: HOSPADM

## 2023-11-03 RX ORDER — LOPERAMIDE HYDROCHLORIDE 2 MG/1
4 CAPSULE ORAL EVERY 2 HOUR PRN
Status: DISCONTINUED | OUTPATIENT
Start: 2023-11-03 | End: 2023-11-07 | Stop reason: HOSPADM

## 2023-11-03 RX ORDER — ACETAMINOPHEN 325 MG/1
975 TABLET ORAL EVERY 6 HOURS
Status: DISCONTINUED | OUTPATIENT
Start: 2023-11-03 | End: 2023-11-07 | Stop reason: HOSPADM

## 2023-11-03 RX ORDER — NALOXONE HYDROCHLORIDE 0.4 MG/ML
0.1 INJECTION, SOLUTION INTRAMUSCULAR; INTRAVENOUS; SUBCUTANEOUS EVERY 5 MIN PRN
Status: DISCONTINUED | OUTPATIENT
Start: 2023-11-03 | End: 2023-11-07 | Stop reason: HOSPADM

## 2023-11-03 RX ORDER — CALCIUM GLUCONATE 98 MG/ML
1 INJECTION, SOLUTION INTRAVENOUS ONCE AS NEEDED
Status: DISCONTINUED | OUTPATIENT
Start: 2023-11-03 | End: 2023-11-07 | Stop reason: HOSPADM

## 2023-11-03 RX ORDER — MISOPROSTOL 200 UG/1
800 TABLET ORAL ONCE AS NEEDED
Status: DISCONTINUED | OUTPATIENT
Start: 2023-11-03 | End: 2023-11-07 | Stop reason: HOSPADM

## 2023-11-03 RX ORDER — GLYCOPYRROLATE 0.2 MG/ML
INJECTION INTRAMUSCULAR; INTRAVENOUS AS NEEDED
Status: DISCONTINUED | OUTPATIENT
Start: 2023-11-03 | End: 2023-11-03

## 2023-11-03 RX ORDER — MISOPROSTOL 200 UG/1
800 TABLET ORAL ONCE AS NEEDED
Status: DISCONTINUED | OUTPATIENT
Start: 2023-11-03 | End: 2023-11-03

## 2023-11-03 RX ORDER — MORPHINE SULFATE 0.5 MG/ML
INJECTION, SOLUTION EPIDURAL; INTRATHECAL; INTRAVENOUS AS NEEDED
Status: DISCONTINUED | OUTPATIENT
Start: 2023-11-03 | End: 2023-11-03

## 2023-11-03 RX ORDER — ADHESIVE BANDAGE
10 BANDAGE TOPICAL
Status: DISCONTINUED | OUTPATIENT
Start: 2023-11-03 | End: 2023-11-07 | Stop reason: HOSPADM

## 2023-11-03 RX ORDER — DEXTROSE 50 % IN WATER (D50W) INTRAVENOUS SYRINGE
25
Status: DISCONTINUED | OUTPATIENT
Start: 2023-11-03 | End: 2023-11-03

## 2023-11-03 RX ORDER — DEXTROSE 40 %
15 GEL (GRAM) ORAL
Status: DISCONTINUED | OUTPATIENT
Start: 2023-11-03 | End: 2023-11-03

## 2023-11-03 RX ORDER — NIFEDIPINE 10 MG/1
10 CAPSULE ORAL ONCE AS NEEDED
Status: DISCONTINUED | OUTPATIENT
Start: 2023-11-03 | End: 2023-11-03

## 2023-11-03 RX ORDER — SIMETHICONE 80 MG
80 TABLET,CHEWABLE ORAL 4 TIMES DAILY PRN
Status: DISCONTINUED | OUTPATIENT
Start: 2023-11-03 | End: 2023-11-07 | Stop reason: HOSPADM

## 2023-11-03 RX ORDER — METRONIDAZOLE 500 MG/1
500 TABLET ORAL EVERY 8 HOURS SCHEDULED
Status: DISPENSED | OUTPATIENT
Start: 2023-11-03 | End: 2023-11-05

## 2023-11-03 RX ORDER — PHENYLEPHRINE 10 MG/250 ML(40 MCG/ML)IN 0.9 % SOD.CHLORIDE INTRAVENOUS
CONTINUOUS PRN
Status: DISCONTINUED | OUTPATIENT
Start: 2023-11-03 | End: 2023-11-03

## 2023-11-03 RX ORDER — SODIUM CHLORIDE, SODIUM LACTATE, POTASSIUM CHLORIDE, CALCIUM CHLORIDE 600; 310; 30; 20 MG/100ML; MG/100ML; MG/100ML; MG/100ML
25 INJECTION, SOLUTION INTRAVENOUS CONTINUOUS
Status: DISCONTINUED | OUTPATIENT
Start: 2023-11-03 | End: 2023-11-04

## 2023-11-03 RX ORDER — FAMOTIDINE 10 MG/ML
INJECTION INTRAVENOUS AS NEEDED
Status: DISCONTINUED | OUTPATIENT
Start: 2023-11-03 | End: 2023-11-03

## 2023-11-03 RX ORDER — METHYLERGONOVINE MALEATE 0.2 MG/ML
0.2 INJECTION INTRAVENOUS ONCE AS NEEDED
Status: DISCONTINUED | OUTPATIENT
Start: 2023-11-03 | End: 2023-11-03

## 2023-11-03 RX ORDER — ONDANSETRON 4 MG/1
4 TABLET, FILM COATED ORAL EVERY 6 HOURS PRN
Status: DISCONTINUED | OUTPATIENT
Start: 2023-11-03 | End: 2023-11-03

## 2023-11-03 RX ORDER — CARBOPROST TROMETHAMINE 250 UG/ML
250 INJECTION, SOLUTION INTRAMUSCULAR ONCE AS NEEDED
Status: DISCONTINUED | OUTPATIENT
Start: 2023-11-03 | End: 2023-11-03

## 2023-11-03 RX ORDER — METOCLOPRAMIDE HYDROCHLORIDE 5 MG/ML
10 INJECTION INTRAMUSCULAR; INTRAVENOUS EVERY 6 HOURS PRN
Status: DISCONTINUED | OUTPATIENT
Start: 2023-11-03 | End: 2023-11-03

## 2023-11-03 RX ORDER — ONDANSETRON HYDROCHLORIDE 2 MG/ML
4 INJECTION, SOLUTION INTRAVENOUS EVERY 6 HOURS PRN
Status: DISCONTINUED | OUTPATIENT
Start: 2023-11-03 | End: 2023-11-03

## 2023-11-03 RX ORDER — TRANEXAMIC ACID 100 MG/ML
1000 INJECTION, SOLUTION INTRAVENOUS ONCE AS NEEDED
Status: DISCONTINUED | OUTPATIENT
Start: 2023-11-03 | End: 2023-11-07 | Stop reason: HOSPADM

## 2023-11-03 RX ORDER — HYDRALAZINE HYDROCHLORIDE 20 MG/ML
5 INJECTION INTRAMUSCULAR; INTRAVENOUS ONCE AS NEEDED
Status: DISCONTINUED | OUTPATIENT
Start: 2023-11-03 | End: 2023-11-03

## 2023-11-03 RX ORDER — METOCLOPRAMIDE HYDROCHLORIDE 5 MG/ML
10 INJECTION INTRAMUSCULAR; INTRAVENOUS EVERY 6 HOURS PRN
Status: DISCONTINUED | OUTPATIENT
Start: 2023-11-03 | End: 2023-11-07 | Stop reason: HOSPADM

## 2023-11-03 RX ORDER — OXYTOCIN 10 [USP'U]/ML
10 INJECTION, SOLUTION INTRAMUSCULAR; INTRAVENOUS ONCE AS NEEDED
Status: DISCONTINUED | OUTPATIENT
Start: 2023-11-03 | End: 2023-11-07 | Stop reason: HOSPADM

## 2023-11-03 RX ORDER — KETOROLAC TROMETHAMINE 30 MG/ML
30 INJECTION, SOLUTION INTRAMUSCULAR; INTRAVENOUS EVERY 6 HOURS
Status: DISPENSED | OUTPATIENT
Start: 2023-11-03 | End: 2023-11-04

## 2023-11-03 RX ORDER — OXYCODONE HYDROCHLORIDE 5 MG/1
10 TABLET ORAL EVERY 4 HOURS PRN
Status: DISCONTINUED | OUTPATIENT
Start: 2023-11-04 | End: 2023-11-07 | Stop reason: HOSPADM

## 2023-11-03 RX ADMIN — DEXAMETHASONE SODIUM PHOSPHATE 4 MG: 4 INJECTION, SOLUTION INTRAMUSCULAR; INTRAVENOUS at 12:08

## 2023-11-03 RX ADMIN — METOCLOPRAMIDE 10 MG: 5 INJECTION, SOLUTION INTRAMUSCULAR; INTRAVENOUS at 21:54

## 2023-11-03 RX ADMIN — ONDANSETRON 4 MG: 2 INJECTION INTRAMUSCULAR; INTRAVENOUS at 17:37

## 2023-11-03 RX ADMIN — BUPIVACAINE HYDROCHLORIDE IN DEXTROSE 1.6 ML: 7.5 INJECTION, SOLUTION SUBARACHNOID at 11:52

## 2023-11-03 RX ADMIN — ACETAMINOPHEN 650 MG: 650 SUPPOSITORY RECTAL at 13:17

## 2023-11-03 RX ADMIN — NIFEDIPINE 60 MG: 60 TABLET, EXTENDED RELEASE ORAL at 08:52

## 2023-11-03 RX ADMIN — OXYTOCIN 600 MILLI-UNITS/MIN: 10 INJECTION, SOLUTION INTRAMUSCULAR; INTRAVENOUS at 12:20

## 2023-11-03 RX ADMIN — LABETALOL HYDROCHLORIDE 800 MG: 200 TABLET, FILM COATED ORAL at 22:34

## 2023-11-03 RX ADMIN — NIFEDIPINE 60 MG: 60 TABLET, EXTENDED RELEASE ORAL at 20:43

## 2023-11-03 RX ADMIN — FENTANYL CITRATE 10 MCG: 50 INJECTION, SOLUTION INTRAMUSCULAR; INTRAVENOUS at 11:52

## 2023-11-03 RX ADMIN — CEFAZOLIN SODIUM 2 G: 2 SOLUTION INTRAVENOUS at 12:02

## 2023-11-03 RX ADMIN — MAGNESIUM SULFATE HEPTAHYDRATE 2 G/HR: 40 INJECTION, SOLUTION INTRAVENOUS at 19:34

## 2023-11-03 RX ADMIN — INSULIN GLARGINE 22 UNITS: 100 INJECTION, SOLUTION SUBCUTANEOUS at 06:54

## 2023-11-03 RX ADMIN — MAGNESIUM SULFATE HEPTAHYDRATE 2 G/HR: 40 INJECTION, SOLUTION INTRAVENOUS at 11:02

## 2023-11-03 RX ADMIN — FAMOTIDINE 20 MG: 10 INJECTION, SOLUTION INTRAVENOUS at 11:20

## 2023-11-03 RX ADMIN — CEPHALEXIN 500 MG: 500 CAPSULE ORAL at 23:57

## 2023-11-03 RX ADMIN — MORPHINE SULFATE 2 MG: 0.5 INJECTION EPIDURAL; INTRATHECAL; INTRAVENOUS at 12:38

## 2023-11-03 RX ADMIN — LABETALOL HYDROCHLORIDE 800 MG: 200 TABLET, FILM COATED ORAL at 15:11

## 2023-11-03 RX ADMIN — HYDRALAZINE HYDROCHLORIDE 5 MG: 20 INJECTION INTRAMUSCULAR; INTRAVENOUS at 08:49

## 2023-11-03 RX ADMIN — LABETALOL HYDROCHLORIDE 800 MG: 200 TABLET, FILM COATED ORAL at 04:34

## 2023-11-03 RX ADMIN — GLYCOPYRROLATE 0.2 MG: 0.2 INJECTION INTRAMUSCULAR; INTRAVENOUS at 12:28

## 2023-11-03 RX ADMIN — ONDANSETRON 4 MG: 2 INJECTION INTRAMUSCULAR; INTRAVENOUS at 11:20

## 2023-11-03 RX ADMIN — HYDRALAZINE HYDROCHLORIDE 10 MG: 20 INJECTION INTRAMUSCULAR; INTRAVENOUS at 09:30

## 2023-11-03 RX ADMIN — ONDANSETRON 4 MG: 2 INJECTION INTRAMUSCULAR; INTRAVENOUS at 23:58

## 2023-11-03 RX ADMIN — KETOROLAC TROMETHAMINE 30 MG: 30 INJECTION, SOLUTION INTRAMUSCULAR; INTRAVENOUS at 18:52

## 2023-11-03 RX ADMIN — METRONIDAZOLE 500 MG: 500 TABLET ORAL at 23:57

## 2023-11-03 RX ADMIN — SODIUM CHLORIDE, SODIUM LACTATE, POTASSIUM CHLORIDE, AND CALCIUM CHLORIDE: 600; 310; 30; 20 INJECTION, SOLUTION INTRAVENOUS at 11:20

## 2023-11-03 RX ADMIN — SODIUM CITRATE AND CITRIC ACID MONOHYDRATE 30 ML: 500; 334 SOLUTION ORAL at 11:20

## 2023-11-03 RX ADMIN — Medication 0.56 MCG/KG/MIN: at 11:52

## 2023-11-03 ASSESSMENT — PAIN SCALES - GENERAL
PAINLEVEL_OUTOF10: 4
PAINLEVEL_OUTOF10: 4
PAINLEVEL_OUTOF10: 0 - NO PAIN
PAIN_LEVEL: 0
PAINLEVEL_OUTOF10: 0 - NO PAIN
PAINLEVEL_OUTOF10: 0 - NO PAIN

## 2023-11-03 NOTE — ANESTHESIA POSTPROCEDURE EVALUATION
Patient: Gabrielle David    Procedure Summary       Date: 23 Room / Location: [facility] 2 OB    Anesthesia Start: 1120 Anesthesia Stop: 1319    Procedure: OBGYN Delivery  Section Diagnosis:     Surgeons: Hussain Durbin MD Responsible Provider: Osvaldo Villegas MD    Anesthesia Type: CSE ASA Status: 3            Anesthesia Type: CSE    Vitals Value Taken Time   /67 23 1328   Temp 36.3 23 1336   Pulse 76 23 1333   Resp 15 23 1336   SpO2 97 % 23 1333       Anesthesia Post Evaluation    Patient location during evaluation: bedside  Patient participation: complete - patient participated  Level of consciousness: awake and alert  Pain score: 0  Pain management: adequate  Airway patency: patent  Cardiovascular status: acceptable  Respiratory status: acceptable  Hydration status: acceptable  Comments: No nausea        No notable events documented.

## 2023-11-03 NOTE — ANESTHESIA PROCEDURE NOTES
Peripheral IV  Date/Time: 11/3/2023 11:52 AM      Placement  Needle size: 18 G  Laterality: right  Location: hand  Local anesthetic: none  Site prep: alcohol  Technique: anatomical landmarks  Attempts: 1

## 2023-11-03 NOTE — ANESTHESIA PROCEDURE NOTES
Peripheral IV  Date/Time: 11/3/2023 11:54 AM  Inserted by: Osvaldo Villegas MD    Placement  Needle size: 18 G  Laterality: left  Location: arm  Local anesthetic: injectable  Site prep: alcohol  Technique: anatomical landmarks  Attempts: 1

## 2023-11-03 NOTE — PROGRESS NOTES
Postpartum Progress Note    Assessment/Plan   Gabrielle David is a 38 y.o.  POD#0 s/p RCS @ 31.1 wga for superimposed pre-eclampsia with severe features, now on L&D for postpartum mag gtt ppx.    Routine postop  - Recovering appropriately  - Continue Keflex/Flagyl x 48 hours  - PPBC: declines    sPEC  - dx'd based on severe-range Bps requiring IV antihypertensives  - s/p IV labetalol 20/40 on admission  - BPs normotensive-mild range  - Continue Nifed 60 BID/Labetalol 800 TID  - HELLP labs negative to date  - Asymptomatic  - Continue Mag gtt @ 2 g/hr    APLS  - Continue Lovenox 40 mg x 6 wks PP    Dispo: continue inpatient management    Discussed with Dr. Jennifer Brito MD  Labor and Delivery    Subjective   Her pain is well controlled with current medications  She is not passing flatus  She is not ambulating  She is tolerating a Adult diet Regular  She reports no breast or nursing problems  She denies emotional concerns today   Her plan for contraception is declines     Denies headache, scotoma, RUQ pain.    Objective   Allergies:   Patient has no known allergies.         Last Vitals:  Temp Pulse Resp BP MAP Pulse Ox   36.5 °C (97.7 °F) 60 16 133/77   (!) 94 %     Vitals Min/Max Last 24 Hours:  Temp  Min: 36.5 °C (97.7 °F)  Max: 37.6 °C (99.7 °F)  Pulse  Min: 52  Max: 91  Resp  Min: 16  Max: 96  BP  Min: 108/65  Max: 186/84    Intake/Output:     Intake/Output Summary (Last 24 hours) at 11/3/2023 1819  Last data filed at 11/3/2023 1726  Gross per 24 hour   Intake 2334.22 ml   Output 810 ml   Net 1524.22 ml       Physical Exam:  General: Examination reveals a well developed, well nourished, female, in no acute distress. She is alert and cooperative.  HEENT: External ears normal. Nose normal, no erythema or discharge. Mouth and throat clear.  Neck: supple, no significant adenopathy.  Lungs: unlabored breathing.  Cardiac: regular rate.  Abdomen: gravid.  Incision: healing well.  Fundus:  firm.  Extremities: no limitation in range of motion.  Neurological: alert, oriented, normal speech, no focal findings or movement disorder noted.  Psychological: awake and alert; oriented to person, place, and time.    Lab Data:  Labs in chart were reviewed.

## 2023-11-03 NOTE — PROGRESS NOTES
"ANTEPARTUM PROGRESS NOTE   11/3/2023, 8:00 AM     SUBJECTIVE: No acute events overnight. Patient has no complaints this morning. Denies painful contractions, VB, LOF. Reports normal fetal movement. Denies HA, vision changes, CP, SOB, RUQ or epigastric pain    OBJECTIVE:   BP (!) 142/84   Pulse 67   Temp 37.3 °C (99.1 °F) (Tympanic)   Resp 18   Ht 1.575 m (5' 2\")   Wt 89.7 kg (197 lb 12 oz)   LMP 2023   SpO2 96%   BMI 36.17 kg/m²    Temp  Min: 36.7 °C (98.1 °F)  Max: 37.6 °C (99.7 °F)  Pulse  Min: 65  Max: 78  BP  Min: 132/80  Max: 159/91    General:  AAOx3, No acute distress  Cardiovascular: Warm and well perfused  Respiratory:  No increased inspiratory effort  Abdominal:  Soft, gravid, tender, no rebound or guarding  Extremities:  Warm, well perfused     NST: Baseline 130, accelerations +, 1 prolonged decel and multiple variable decelerations, min-mod variability   [place]: quiet    Labs:   Lab Results   Component Value Date    WBC 8.4 2023    HGB 12.8 2023    HCT 37.3 2023     2023     Lab Results   Component Value Date    GLUCOSE 68 (L) 2023     2023    K 4.3 2023     2023    CO2 22 2023    ANIONGAP 13 2023    BUN 13 2023    CREATININE 0.72 2023    EGFR >90 2023    CALCIUM 9.2 2023    ALBUMIN 3.0 (L) 2023    PROT 6.0 (L) 2023    ALKPHOS 88 2023    ALT 11 2023    AST 11 2023    BILITOT 0.5 2023       ASSESSMENT AND PLAN:     38 y.o.  at 31w1d with siPEC w/SF and sFGR w/ abnormal Dopplers.      CHTN w/ siPEC w/ SF  - Dx by severe range BPs requiring IV treatment  - IV labetalol 20/40 on admission  - HELLP labs neg to date, continue twice weekly labs  - asymptomatic  - Current BP Regimen: Nifed 60 BID + 800mg TID ()  - Anticipate admission until delivery at 34wga pending clinical status     FGR   - Last growth 10/25 EFW 1264g (9%), AC 13%  - Elevated UA " dopplers, PI >99%ile 10/30  - for twice weekly BPP/dopplers (mon/thur)     APLS   - Continue lovenox ppx 40 mg SC daily, for nurses to ask prior to giving just in case delivery is more imminent  - Continue  mg     A2DM   - A1c 5.1%   - Pregnancy regimen prior to admission: Basaglar  and Lispro 10/18/24  - Regimen increased in the setting of BMZ, insulin requirements now decreasing,   - Glargine  + Lispro 10/18/24  - Continue fasting and 1hr pp Bgs     Recurrent UTI   - Continue daily Keflex suppression     Fetal status: NRNST with 1 prolonged decel and multiple variable decelerations, min-mod variability, to L&D for prolonged monitoring  - continue daily NSTs while inpatient   - Ultrasound: EFW 1264g (9%) on 10/25, cephalic    - S/p BMZx2 on 10/20-   - S/p NICU consult      Routine:  - GBS pos  - s/p TDAP  - Prior Classical C/S, For repeat CS x 3 if headed toward delivery     Dispo: L&D for prolonged monitoring. Continue inpatient monitoring in s/o siPEC w SF, FGR, and A2DM. Anticipate delivery at 34.0 wga pending maternal and fetal stability.     Pt seen and discussed with M Attending, Dr. Roque.      Freida Dave MD, PGY-3   [facility]  Pager [phone]     Principal Problem:    Chronic hypertension with superimposed preeclampsia  Active Problems:    History of  section, classical    Insulin controlled gestational diabetes mellitus (GDM) in third trimester    History of IUFD    Antiphospholipid antibody syndrome complicating pregnancy (CMS/HCC)    Recurrent urinary tract infection affecting pregnancy in second trimester    Poor fetal growth affecting management of mother in third trimester    Group B streptococcal infection during pregnancy    Gastroesophageal reflux disease without esophagitis

## 2023-11-03 NOTE — CARE PLAN
VSS t/o shift. No s/sx sipec. Blood sugars well managed on current regimen. No ctx, cramping, bleeding, or LOF. FHR WNL during spot checks. NST had minimal variability with decels, pt to Mac 2.       Problem: Diabetes  Goal: Achieve decreasing blood glucose levels by end of shift  Outcome: Progressing  Goal: Increase stability of blood glucose readings by end of shift  Outcome: Progressing     Problem: Antepartum  Goal: Maintain pregnancy as long as maternal and/or fetal condition is stable  Outcome: Progressing  Goal: Avoid/minimize constipation  Outcome: Progressing  Goal: No decrease in circulation/VTE  Outcome: Progressing  Goal: FHR remains reassuring  Outcome: Progressing  Goal: Minimize anxiety/maximize coping  Outcome: Progressing     Problem: Hypertensive Disorder of Pregnancy (HDP)  Goal: Minimal s/sx of HDP and BP<160/110  Outcome: Progressing  Goal: Adequate urine output (0.5 ml/kg/hr)  Outcome: Progressing     Problem: Pain - Adult  Goal: Verbalizes/displays adequate comfort level or baseline comfort level  Outcome: Progressing     Problem: Safety - Adult  Goal: Free from fall injury  Outcome: Progressing

## 2023-11-03 NOTE — ANESTHESIA PROCEDURE NOTES
CSE Block    Patient location during procedure: OR  Start time: 11/3/2023 11:27 AM  End time: 11/3/2023 11:53 AM  Reason for block: primary anesthetic}  Staffing  Performed: resident   Authorized by: Osvaldo Villegas MD    Performed by: Stevo Miller DO    Preanesthetic Checklist  Completed: patient identified, IV checked, risks and benefits discussed, surgical consent, monitors and equipment checked, pre-op evaluation, timeout performed and sterile techniques followed  Block Timeout  RN/Licensed healthcare professional reads aloud to the Anesthesia provider and entire team: Patient identity, procedure with side and site, patient position, and as applicable the availability of implants/special equipment/special requirements.  Patient on coagulant treatment: yes  Timeout performed at: 11/3/2023 11:27 AM    CSE Block  Patient position: sitting  Prep: ChloraPrep  Sterility prep: cap, drape, gloves, hand and mask  Sedation level: no sedation  Patient monitoring: blood pressure, continuous pulse oximetry and heart rate  Approach: midline  Local numbing: lidocaine 1% to skin and subcutaneous tissues  Vertebral space: lumbar  L3-4  Guidance: landmark technique    Epidural Needle  IRENE technique: saline  Needle type: Tuohy   Needle gauge: 17 G  Needle length: 8.9 cm  Needle insertion depth: 6 cm  Spinal Needle  Needle type: Quincke   Needle gauge: 25 G  Needle length: 12.7 cm  Free flow CSF: yes  Epidural Catheter  Catheter type: end hole  Catheter size: 22 G  Catheter at skin depth: 11 cm  Catheter securement method: clear occlusive dressing              Assessment  Sensory level: T6 bilateral  Block outcome: Allis test negative  Number of attempts: 2  Procedure assessment: patient tolerated procedure well with no immediate complications

## 2023-11-03 NOTE — L&D DELIVERY NOTE
I was present for the key portions of this procedure.  Hussain Durbin MD 2023 5:15 PM       OB Delivery Note  2023  Gabrielle David  38 y.o.   , Low Transverse      Date: 11/3/2023  OR Location: [facility]    Name: Gabrielle David, : 1985, Age: 38 y.o., MRN: [id number], Sex: female    Diagnosis  * No Diagnosis Codes entered * * No Diagnosis Codes entered *     Procedures    * OBGYN Delivery  Section    Surgeons      * Hussain Durbin - Primary    Resident/Fellow/Other Assistant:  Surgeon(s) and Role:    Procedure Summary  Anesthesia: * No anesthesia type entered *  ASA: III  Anesthesia Staff: Anesthesiologist: Osvaldo Villegas MD  Anesthesia Resident: Stevo Miller DO  Estimated Blood Loss: 510mL  Intra-op Medications: * Intraprocedure medication information is unavailable because the case start and end events have not been set *           Anesthesia Record               Intraprocedure I/O Totals          Intake    Magnesium Sulfate 0.00 mL    The total shown is the total volume documented since Anesthesia Start was filed.    LR 1200.00 mL    Oxytocin Drip 356.00 mL    The total shown is the total volume documented since Anesthesia Start was filed.    Phenylephrine Drip 77.05 mL    The total shown is the total volume documented since Anesthesia Start was filed.    Total Intake 1633.05 mL       Output    Urine 70 mL    Total Blood Loss - Surgical Delivery (mL) 510 mL    Total Output 580 mL       Net    Net Volume 1053.05 mL       Other (could not be determined as input or output)    Surgical Delivery Estimated Blood Loss (mL) 510          Specimen:   ID Type Source Tests Collected by Time   1 : placenta Tissue PLACENTA SINGLE SURGICAL PATHOLOGY EXAM Hussain Durbin MD 11/3/2023 1222        Staff:   Scrub Person: Carmen Rahman      pre op diagnosis:  1. IUP at 31w1d  2. siPEC w SF    Post op diagnosis: Same    Findings: Normal appearing fallopian tubes, and ovaries. Gravid Uterus with multiple  fibroids, largest is 5cm x 4cm fundal fibroid. Amniotic fluid clear, male infant in cephalic presentation    Procedure: Patient was taken to the OR where combined spinal epidural anesthesia was administered.  She was then placed in the dorsal supine position with a left lateral tilt. A rogers catheter was placed, SCDs were applied, and a vaginal prep was performed. A pre-procedure time out was performed.  The patient was given prophylactic dose of IV antibiotics. She was then prepped and draped in the usual sterile fashion. A Pfannenstiel skin incision was made through the skin with the scalpel and then carried through the subcutaneous fat to the underlying fascial layer with sharp dissection. The fascia was incised on either side of the midline with the scalpel, and fascia was then dissected off the rectus muscle bilaterally using sharp dissection with electrosurgery with clear visualization inferiorly using Malia clamps. The superior aspect of the incision was grasped, tented up with Kocher clamps and the rectus muscle was dissected off bluntly. The muscles were divided in the midline, the peritoneum was identified and then entered bluntly, and incision extended superiorly and inferiorly with good visualization of bladder below. Bladder blade was inserted. A low transverse uterine incision was made with the scalpel, the uterine cavity was entered, and the hysterotomy was extended bilaterally with cephalocaudal stretching. Bandage scissors were used to the extend the hysterotomy laterally on the right. The infant was delivered atraumatically, the cord was clamped and cut, and infant was handed off to the awaiting nursing staff. A cord segment and blood sample were collected. The placenta was then expressed. The uterus was exteriorized and cleared of all clot and debris. The uterine incision was repaired using a running stitch of 0-Vicryl. A second layer of the same suture was placed in a horizontal imbricating  fashion. One figure of eight stitch was placed on the right aspect of the hysterotomy. Good hemostasis was noted. The uterus was then placed back inside the pelvis, the gutters were cleared of all clots and debris. The hysterotomy was again evaluated and found to be hemostatic, and the underside of the fascia and bladder. Persistent oozing was noted on the right rectus muscle, therefore figure of eight of 0-vicryl was placed. Good hemostasis was then noted. The fascia was closed using a running stitch of 0-PDS. The subcutaneous layer was irrigated, small bleeding vessels were cauterized, and the subcutaneous layer was reapproximated using a running stitch of 2-0 Monocryl. The skin was closed with 4-0 Monocryl on a curved needle.  All counts were correct, the patient tolerated the procedure well. Dr. Durbin was present for all key portions of the procedure. The patient was taken back to the recovery room in stable condition.         Gestational Age: 31w1d  /Para:   Quantitative Blood Loss: Admission to Discharge: 520 mL (10/20/2023  6:08 PM - 2023 11:22 AM)    Sabas David [[id number]]      Labor Events    Sac identifier: Sac 1  Rupture date/time: 11/3/2023 1220  Rupture type: Artificial  Fluid color: Clear  Fluid odor: None  Labor type:  Without Labor  Labor allowed to proceed with plans for an attempted vaginal birth?: No  Complications: None       Labor Length    3rd stage: 0h 01m       Placenta    Placenta delivery date/time: 11/3/2023 1222  Placenta removal: Expressed  Placenta appearance: Intact  Placenta disposition: pathology       Cord    Vessels: 3 vessels  Complications: Nuchal  Nuchal intervention: reduced  Nuchal cord description: loose nuchal cord  Number of loops: 1  Delayed cord clamping?: Yes  Gases sent?: No       Lacerations    Episiotomy: None  Perineal laceration: None  Other lacerations?: No  Repair suture: None       Anesthesia    Method: Combined spinal-epidural        Operative Delivery    Forceps attempted?: No  Vacuum extractor attempted?: No       Shoulder Dystocia    Shoulder dystocia present?: No       Rowe Delivery    Birth date/time: 11/3[birth year]20[birth year] 12:21:00  Delivery type: , Low Transverse  Complications: None       Resuscitation    Method: Continuous positive airway pressure (CPAP), Suctioning, Supplemental oxygen, Tactile stimulation       Apgars    Living status: Living  Apgar Component Scores:  1 min.:  5 min.:  10 min.:  15 min.:  20 min.:    Skin color:  1  1  1      Heart rate:  2  2  2      Reflex irritability:  1  2  2      Muscle tone:  2  2  2      Respiratory effort:  1  1  1      Total:  7  8  8      Apgars assigned by: MALVIN VILLASEÑOR       Delivery Providers    Delivering clinician: Hussain Durbin MD   Provider Role    Abad Hawkins, RN Delivery Nurse    Sabiha Bethea, RN Nursery Nurse    Carolina Caballero MD Resident                 Hussain Durbin MD

## 2023-11-03 NOTE — SIGNIFICANT EVENT
Decision for delivery    Pt on L&D for prolonged monitoring when she developed severe range Bps, treated with hydralazine this morning.  Reviewed with [facility] team.  Given pt has cHTN with superimposed preeclampsia with severe features, now maxed out on 2 agents, M's recommendation is for delivery today.    Pt aware and agreeable.  Consents to blood if needed.    NICU aware.   Anesthesia aware  Will start magnesium sulfate for seizure prophylaxis and fetal neuroprotection and will proceed to OR     Hussain Durbin MD

## 2023-11-04 LAB
ALBUMIN SERPL BCP-MCNC: 2.8 G/DL (ref 3.4–5)
ALP SERPL-CCNC: 74 U/L (ref 33–110)
ALT SERPL W P-5'-P-CCNC: 10 U/L (ref 7–45)
ANION GAP SERPL CALC-SCNC: 13 MMOL/L (ref 10–20)
AST SERPL W P-5'-P-CCNC: 11 U/L (ref 9–39)
BILIRUB SERPL-MCNC: 0.3 MG/DL (ref 0–1.2)
BUN SERPL-MCNC: 20 MG/DL (ref 6–23)
CALCIUM SERPL-MCNC: 6.7 MG/DL (ref 8.6–10.6)
CHLORIDE SERPL-SCNC: 101 MMOL/L (ref 98–107)
CO2 SERPL-SCNC: 22 MMOL/L (ref 21–32)
CREAT SERPL-MCNC: 1.17 MG/DL (ref 0.5–1.05)
ERYTHROCYTE [DISTWIDTH] IN BLOOD BY AUTOMATED COUNT: 14.6 % (ref 11.5–14.5)
ERYTHROCYTE [DISTWIDTH] IN BLOOD BY AUTOMATED COUNT: 14.9 % (ref 11.5–14.5)
ERYTHROCYTE [DISTWIDTH] IN BLOOD BY AUTOMATED COUNT: 14.9 % (ref 11.5–14.5)
GFR SERPL CREATININE-BSD FRML MDRD: 61 ML/MIN/1.73M*2
GLUCOSE SERPL-MCNC: 121 MG/DL (ref 74–99)
HCT VFR BLD AUTO: 28 % (ref 36–46)
HCT VFR BLD AUTO: 28.8 % (ref 36–46)
HCT VFR BLD AUTO: 31.2 % (ref 36–46)
HGB BLD-MCNC: 9.2 G/DL (ref 12–16)
HGB BLD-MCNC: 9.2 G/DL (ref 12–16)
HGB BLD-MCNC: 9.5 G/DL (ref 12–16)
MAGNESIUM SERPL-MCNC: 4.99 MG/DL (ref 1.6–2.4)
MCH RBC QN AUTO: 30.9 PG (ref 26–34)
MCH RBC QN AUTO: 31.6 PG (ref 26–34)
MCH RBC QN AUTO: 32.2 PG (ref 26–34)
MCHC RBC AUTO-ENTMCNC: 29.5 G/DL (ref 32–36)
MCHC RBC AUTO-ENTMCNC: 32.9 G/DL (ref 32–36)
MCHC RBC AUTO-ENTMCNC: 33 G/DL (ref 32–36)
MCV RBC AUTO: 107 FL (ref 80–100)
MCV RBC AUTO: 94 FL (ref 80–100)
MCV RBC AUTO: 98 FL (ref 80–100)
NRBC BLD-RTO: 0 /100 WBCS (ref 0–0)
PLATELET # BLD AUTO: 152 X10*3/UL (ref 150–450)
PLATELET # BLD AUTO: 170 X10*3/UL (ref 150–450)
PLATELET # BLD AUTO: 63 X10*3/UL (ref 150–450)
POTASSIUM SERPL-SCNC: 4.2 MMOL/L (ref 3.5–5.3)
PROT SERPL-MCNC: 5 G/DL (ref 6.4–8.2)
RBC # BLD AUTO: 2.91 X10*6/UL (ref 4–5.2)
RBC # BLD AUTO: 2.95 X10*6/UL (ref 4–5.2)
RBC # BLD AUTO: 2.98 X10*6/UL (ref 4–5.2)
SODIUM SERPL-SCNC: 132 MMOL/L (ref 136–145)
WBC # BLD AUTO: 10.5 X10*3/UL (ref 4.4–11.3)
WBC # BLD AUTO: 10.8 X10*3/UL (ref 4.4–11.3)
WBC # BLD AUTO: 6.4 X10*3/UL (ref 4.4–11.3)

## 2023-11-04 PROCEDURE — 96372 THER/PROPH/DIAG INJ SC/IM: CPT

## 2023-11-04 PROCEDURE — 2500000004 HC RX 250 GENERAL PHARMACY W/ HCPCS (ALT 636 FOR OP/ED)

## 2023-11-04 PROCEDURE — 83735 ASSAY OF MAGNESIUM: CPT | Performed by: STUDENT IN AN ORGANIZED HEALTH CARE EDUCATION/TRAINING PROGRAM

## 2023-11-04 PROCEDURE — 36415 COLL VENOUS BLD VENIPUNCTURE: CPT

## 2023-11-04 PROCEDURE — 2500000001 HC RX 250 WO HCPCS SELF ADMINISTERED DRUGS (ALT 637 FOR MEDICARE OP): Performed by: STUDENT IN AN ORGANIZED HEALTH CARE EDUCATION/TRAINING PROGRAM

## 2023-11-04 PROCEDURE — 36415 COLL VENOUS BLD VENIPUNCTURE: CPT | Performed by: STUDENT IN AN ORGANIZED HEALTH CARE EDUCATION/TRAINING PROGRAM

## 2023-11-04 PROCEDURE — 2500000004 HC RX 250 GENERAL PHARMACY W/ HCPCS (ALT 636 FOR OP/ED): Performed by: STUDENT IN AN ORGANIZED HEALTH CARE EDUCATION/TRAINING PROGRAM

## 2023-11-04 PROCEDURE — 94760 N-INVAS EAR/PLS OXIMETRY 1: CPT

## 2023-11-04 PROCEDURE — 85027 COMPLETE CBC AUTOMATED: CPT | Performed by: STUDENT IN AN ORGANIZED HEALTH CARE EDUCATION/TRAINING PROGRAM

## 2023-11-04 PROCEDURE — 85027 COMPLETE CBC AUTOMATED: CPT

## 2023-11-04 PROCEDURE — 2500000001 HC RX 250 WO HCPCS SELF ADMINISTERED DRUGS (ALT 637 FOR MEDICARE OP)

## 2023-11-04 PROCEDURE — 1210000001 HC SEMI-PRIVATE ROOM DAILY

## 2023-11-04 PROCEDURE — 80053 COMPREHEN METABOLIC PANEL: CPT | Performed by: STUDENT IN AN ORGANIZED HEALTH CARE EDUCATION/TRAINING PROGRAM

## 2023-11-04 PROCEDURE — 99232 SBSQ HOSP IP/OBS MODERATE 35: CPT | Performed by: STUDENT IN AN ORGANIZED HEALTH CARE EDUCATION/TRAINING PROGRAM

## 2023-11-04 RX ORDER — PROCHLORPERAZINE 25 MG/1
25 SUPPOSITORY RECTAL EVERY 12 HOURS PRN
Status: DISCONTINUED | OUTPATIENT
Start: 2023-11-04 | End: 2023-11-07 | Stop reason: HOSPADM

## 2023-11-04 RX ORDER — PROCHLORPERAZINE EDISYLATE 5 MG/ML
10 INJECTION INTRAMUSCULAR; INTRAVENOUS EVERY 6 HOURS PRN
Status: DISCONTINUED | OUTPATIENT
Start: 2023-11-04 | End: 2023-11-07 | Stop reason: HOSPADM

## 2023-11-04 RX ORDER — SCOLOPAMINE TRANSDERMAL SYSTEM 1 MG/1
1 PATCH, EXTENDED RELEASE TRANSDERMAL
Status: DISCONTINUED | OUTPATIENT
Start: 2023-11-04 | End: 2023-11-07 | Stop reason: HOSPADM

## 2023-11-04 RX ORDER — PROCHLORPERAZINE MALEATE 10 MG
10 TABLET ORAL EVERY 6 HOURS PRN
Status: DISCONTINUED | OUTPATIENT
Start: 2023-11-04 | End: 2023-11-07 | Stop reason: HOSPADM

## 2023-11-04 RX ADMIN — ACETAMINOPHEN 975 MG: 325 TABLET ORAL at 23:44

## 2023-11-04 RX ADMIN — CEPHALEXIN 500 MG: 500 CAPSULE ORAL at 23:44

## 2023-11-04 RX ADMIN — LABETALOL HYDROCHLORIDE 800 MG: 200 TABLET, FILM COATED ORAL at 15:59

## 2023-11-04 RX ADMIN — IBUPROFEN 600 MG: 600 TABLET, FILM COATED ORAL at 23:44

## 2023-11-04 RX ADMIN — IBUPROFEN 600 MG: 600 TABLET, FILM COATED ORAL at 10:49

## 2023-11-04 RX ADMIN — MAGNESIUM SULFATE HEPTAHYDRATE 2 G/HR: 40 INJECTION, SOLUTION INTRAVENOUS at 05:52

## 2023-11-04 RX ADMIN — SIMETHICONE 80 MG: 80 TABLET, CHEWABLE ORAL at 15:58

## 2023-11-04 RX ADMIN — NIFEDIPINE 60 MG: 60 TABLET, EXTENDED RELEASE ORAL at 09:30

## 2023-11-04 RX ADMIN — IBUPROFEN 600 MG: 600 TABLET, FILM COATED ORAL at 16:24

## 2023-11-04 RX ADMIN — ENOXAPARIN SODIUM 40 MG: 100 INJECTION SUBCUTANEOUS at 01:26

## 2023-11-04 RX ADMIN — METRONIDAZOLE 500 MG: 500 TABLET ORAL at 23:44

## 2023-11-04 RX ADMIN — CEPHALEXIN 500 MG: 500 CAPSULE ORAL at 06:45

## 2023-11-04 RX ADMIN — CEPHALEXIN 500 MG: 500 CAPSULE ORAL at 15:59

## 2023-11-04 RX ADMIN — ACETAMINOPHEN 975 MG: 325 TABLET ORAL at 16:24

## 2023-11-04 RX ADMIN — NIFEDIPINE 60 MG: 60 TABLET, EXTENDED RELEASE ORAL at 20:46

## 2023-11-04 RX ADMIN — ONDANSETRON 4 MG: 2 INJECTION INTRAMUSCULAR; INTRAVENOUS at 06:45

## 2023-11-04 RX ADMIN — KETOROLAC TROMETHAMINE 30 MG: 30 INJECTION, SOLUTION INTRAMUSCULAR; INTRAVENOUS at 01:26

## 2023-11-04 RX ADMIN — PROCHLORPERAZINE EDISYLATE 10 MG: 5 INJECTION INTRAMUSCULAR; INTRAVENOUS at 09:30

## 2023-11-04 RX ADMIN — LABETALOL HYDROCHLORIDE 800 MG: 200 TABLET, FILM COATED ORAL at 23:54

## 2023-11-04 RX ADMIN — LABETALOL HYDROCHLORIDE 800 MG: 200 TABLET, FILM COATED ORAL at 06:45

## 2023-11-04 RX ADMIN — ACETAMINOPHEN 975 MG: 325 TABLET ORAL at 10:47

## 2023-11-04 RX ADMIN — METOCLOPRAMIDE 10 MG: 5 INJECTION, SOLUTION INTRAMUSCULAR; INTRAVENOUS at 04:19

## 2023-11-04 RX ADMIN — SCOPOLAMINE 1 PATCH: 1.5 PATCH, EXTENDED RELEASE TRANSDERMAL at 09:30

## 2023-11-04 RX ADMIN — METRONIDAZOLE 500 MG: 500 TABLET ORAL at 06:45

## 2023-11-04 RX ADMIN — METRONIDAZOLE 500 MG: 500 TABLET ORAL at 15:59

## 2023-11-04 RX ADMIN — OXYCODONE HYDROCHLORIDE 5 MG: 5 TABLET ORAL at 20:46

## 2023-11-04 ASSESSMENT — PAIN DESCRIPTION - DESCRIPTORS
DESCRIPTORS: ACHING
DESCRIPTORS: ACHING;SHARP
DESCRIPTORS: ACHING
DESCRIPTORS: ACHING
DESCRIPTORS: SORE
DESCRIPTORS: ACHING;SORE
DESCRIPTORS: ACHING

## 2023-11-04 ASSESSMENT — PAIN SCALES - GENERAL
PAINLEVEL_OUTOF10: 6
PAINLEVEL_OUTOF10: 4
PAINLEVEL_OUTOF10: 3
PAINLEVEL_OUTOF10: 6
PAINLEVEL_OUTOF10: 2
PAINLEVEL_OUTOF10: 3
PAINLEVEL_OUTOF10: 3
PAINLEVEL_OUTOF10: 5 - MODERATE PAIN
PAINLEVEL_OUTOF10: 6
PAINLEVEL_OUTOF10: 1
PAINLEVEL_OUTOF10: 1
PAINLEVEL_OUTOF10: 3
PAINLEVEL_OUTOF10: 0 - NO PAIN

## 2023-11-04 ASSESSMENT — PAIN - FUNCTIONAL ASSESSMENT
PAIN_FUNCTIONAL_ASSESSMENT: 0-10
PAIN_FUNCTIONAL_ASSESSMENT: 0-10

## 2023-11-04 NOTE — PROGRESS NOTES
Postpartum Progress Note    Assessment/Plan   Gabrielle David is a 38 y.o.  POD#1 s/p RCS @ 31.1 wga for superimposed pre-eclampsia with severe features, now on L&D for postpartum mag gtt ppx.    Routine postop  - Recovering appropriately  - Continue Keflex/Flagyl x 48 hours  - PPBC: declines  - prn antiemetics ordered    sPEC  - dx'd based on severe-range Bps requiring IV antihypertensives  - s/p IV labetalol 20/40 on admission  - BPs normotensive-mild range  - Continue Nifed 60 BID/Labetalol 800 TID  - HELLP labs negative to date  - Asymptomatic  - Continue Mag gtt @ 2 g/hr  - mag level now    APLS  - Continue Lovenox 40 mg x 6 wks PP    Dispo: continue inpatient management    Discussed with Dr. Sabiha Dave MD  Labor and Delivery    Subjective   She is feeling very tired, swollen, nauseated, and has a headache.     Her pain is well controlled with current medications  She is not passing flatus  She is not ambulating  She is tolerating a Adult diet Regular  She reports no breast or nursing problems  She denies emotional concerns today   Her plan for contraception is declines     Denies scotoma, RUQ pain.    Objective   Allergies:   Patient has no known allergies.         Last Vitals:  Temp Pulse Resp BP MAP Pulse Ox   36.6 °C (97.9 °F) 58 16 120/66   96 %     Vitals Min/Max Last 24 Hours:  Temp  Min: 36.4 °C (97.5 °F)  Max: 36.6 °C (97.9 °F)  Pulse  Min: 50  Max: 106  Resp  Min: 16  Max: 96  BP  Min: 107/60  Max: 186/84    Intake/Output:     Intake/Output Summary (Last 24 hours) at 2023 0906  Last data filed at 2023 0730  Gross per 24 hour   Intake 4134.05 ml   Output 1585 ml   Net 2549.05 ml       Physical Exam:  Physical exam:  General:  AAOx3, No acute distress  Cardiovascular: bradycardic to high 50s, reg rhythm  Respiratory: Normal respiratory effort, CTAB  Abdominal:  Soft, non-tender  Extremities: 2+ edema in arms and legs  Skin: No rashes or lesions visualized  Neuro: 2+  reflexes    Lab Data:  Lab Results   Component Value Date    WBC 6.4 11/04/2023    HGB 9.2 (L) 11/04/2023    HCT 31.2 (L) 11/04/2023     11/04/2023     Lab Results   Component Value Date    GLUCOSE 77 11/03/2023     11/03/2023    K 4.2 11/03/2023     11/03/2023    CO2 21 11/03/2023    ANIONGAP 14 11/03/2023    BUN 13 11/03/2023    CREATININE 0.70 11/03/2023    EGFR >90 11/03/2023    CALCIUM 8.4 (L) 11/03/2023    ALBUMIN 3.0 (L) 11/03/2023    PROT 5.6 (L) 11/03/2023    ALKPHOS 86 11/03/2023    ALT 13 11/03/2023    AST 17 11/03/2023    BILITOT 0.4 11/03/2023

## 2023-11-04 NOTE — PROGRESS NOTES
Gabrielle aDvid is a 38 y.o., , who had a , Low Transverse  delivery on 11/3/2023  at 31w1d and is now POD1.    She had CSE without immediate complications noted.       Pain well controlled    Vitals:    23 0845   BP:    Pulse: 66   Resp:    Temp:    SpO2: 98%       Neuraxial site assessed. No visible redness or swelling or drainage. Patient able to ambulate and move all extremities without difficulty. Able to void. No complaints of nausea/vomiting. Tolerating PO intake well. No s/sx of PDPH. Patient feeling nauseated, likely secondary to magnesium. Denies any other complaints including headache or positional pain.     Anesthesia will sign off     Mela Trammell MD

## 2023-11-05 LAB
ERYTHROCYTE [DISTWIDTH] IN BLOOD BY AUTOMATED COUNT: 14.8 % (ref 11.5–14.5)
GLUCOSE BLD MANUAL STRIP-MCNC: 96 MG/DL (ref 74–99)
HCT VFR BLD AUTO: 27.8 % (ref 36–46)
HGB BLD-MCNC: 9.2 G/DL (ref 12–16)
MCH RBC QN AUTO: 32.1 PG (ref 26–34)
MCHC RBC AUTO-ENTMCNC: 33.1 G/DL (ref 32–36)
MCV RBC AUTO: 97 FL (ref 80–100)
NRBC BLD-RTO: 0 /100 WBCS (ref 0–0)
PLATELET # BLD AUTO: 176 X10*3/UL (ref 150–450)
RBC # BLD AUTO: 2.87 X10*6/UL (ref 4–5.2)
WBC # BLD AUTO: 8 X10*3/UL (ref 4.4–11.3)

## 2023-11-05 PROCEDURE — 36415 COLL VENOUS BLD VENIPUNCTURE: CPT | Performed by: STUDENT IN AN ORGANIZED HEALTH CARE EDUCATION/TRAINING PROGRAM

## 2023-11-05 PROCEDURE — 82947 ASSAY GLUCOSE BLOOD QUANT: CPT

## 2023-11-05 PROCEDURE — 96372 THER/PROPH/DIAG INJ SC/IM: CPT | Performed by: STUDENT IN AN ORGANIZED HEALTH CARE EDUCATION/TRAINING PROGRAM

## 2023-11-05 PROCEDURE — 99232 SBSQ HOSP IP/OBS MODERATE 35: CPT | Performed by: STUDENT IN AN ORGANIZED HEALTH CARE EDUCATION/TRAINING PROGRAM

## 2023-11-05 PROCEDURE — 2500000001 HC RX 250 WO HCPCS SELF ADMINISTERED DRUGS (ALT 637 FOR MEDICARE OP): Performed by: STUDENT IN AN ORGANIZED HEALTH CARE EDUCATION/TRAINING PROGRAM

## 2023-11-05 PROCEDURE — 1210000001 HC SEMI-PRIVATE ROOM DAILY

## 2023-11-05 PROCEDURE — 2500000004 HC RX 250 GENERAL PHARMACY W/ HCPCS (ALT 636 FOR OP/ED): Performed by: STUDENT IN AN ORGANIZED HEALTH CARE EDUCATION/TRAINING PROGRAM

## 2023-11-05 PROCEDURE — 85027 COMPLETE CBC AUTOMATED: CPT | Performed by: STUDENT IN AN ORGANIZED HEALTH CARE EDUCATION/TRAINING PROGRAM

## 2023-11-05 PROCEDURE — 2500000001 HC RX 250 WO HCPCS SELF ADMINISTERED DRUGS (ALT 637 FOR MEDICARE OP)

## 2023-11-05 RX ORDER — FERROUS SULFATE 325(65) MG
65 TABLET ORAL
Status: DISCONTINUED | OUTPATIENT
Start: 2023-11-06 | End: 2023-11-07 | Stop reason: HOSPADM

## 2023-11-05 RX ADMIN — LABETALOL HYDROCHLORIDE 800 MG: 200 TABLET, FILM COATED ORAL at 23:37

## 2023-11-05 RX ADMIN — IBUPROFEN 600 MG: 600 TABLET, FILM COATED ORAL at 20:04

## 2023-11-05 RX ADMIN — OXYCODONE 10 MG: 5 TABLET ORAL at 17:29

## 2023-11-05 RX ADMIN — ACETAMINOPHEN 975 MG: 325 TABLET ORAL at 06:45

## 2023-11-05 RX ADMIN — OXYCODONE HYDROCHLORIDE 5 MG: 5 TABLET ORAL at 21:37

## 2023-11-05 RX ADMIN — NIFEDIPINE 60 MG: 60 TABLET, EXTENDED RELEASE ORAL at 21:37

## 2023-11-05 RX ADMIN — IBUPROFEN 600 MG: 600 TABLET, FILM COATED ORAL at 06:46

## 2023-11-05 RX ADMIN — ENOXAPARIN SODIUM 40 MG: 100 INJECTION SUBCUTANEOUS at 01:56

## 2023-11-05 RX ADMIN — ACETAMINOPHEN 975 MG: 325 TABLET ORAL at 20:03

## 2023-11-05 RX ADMIN — ACETAMINOPHEN 975 MG: 325 TABLET ORAL at 14:07

## 2023-11-05 RX ADMIN — METRONIDAZOLE 500 MG: 500 TABLET ORAL at 06:46

## 2023-11-05 RX ADMIN — NIFEDIPINE 60 MG: 60 TABLET, EXTENDED RELEASE ORAL at 09:04

## 2023-11-05 RX ADMIN — LABETALOL HYDROCHLORIDE 800 MG: 200 TABLET, FILM COATED ORAL at 16:28

## 2023-11-05 RX ADMIN — IBUPROFEN 600 MG: 600 TABLET, FILM COATED ORAL at 14:07

## 2023-11-05 RX ADMIN — CEPHALEXIN 500 MG: 500 CAPSULE ORAL at 06:46

## 2023-11-05 RX ADMIN — OXYCODONE HYDROCHLORIDE 5 MG: 5 TABLET ORAL at 12:36

## 2023-11-05 RX ADMIN — OXYCODONE 10 MG: 5 TABLET ORAL at 01:56

## 2023-11-05 RX ADMIN — LABETALOL HYDROCHLORIDE 800 MG: 200 TABLET, FILM COATED ORAL at 06:44

## 2023-11-05 ASSESSMENT — PAIN SCALES - GENERAL
PAINLEVEL_OUTOF10: 9
PAINLEVEL_OUTOF10: 9
PAINLEVEL_OUTOF10: 3
PAINLEVEL_OUTOF10: 4
PAINLEVEL_OUTOF10: 6
PAINLEVEL_OUTOF10: 3
PAINLEVEL_OUTOF10: 7
PAINLEVEL_OUTOF10: 3
PAINLEVEL_OUTOF10: 2
PAINLEVEL_OUTOF10: 6
PAINLEVEL_OUTOF10: 3
PAINLEVEL_OUTOF10: 6

## 2023-11-05 ASSESSMENT — PAIN DESCRIPTION - DESCRIPTORS
DESCRIPTORS: ACHING;SORE
DESCRIPTORS: ACHING
DESCRIPTORS: ACHING;DISCOMFORT
DESCRIPTORS: ACHING;SORE
DESCRIPTORS: ACHING

## 2023-11-05 ASSESSMENT — PAIN - FUNCTIONAL ASSESSMENT: PAIN_FUNCTIONAL_ASSESSMENT: 0-10

## 2023-11-05 NOTE — CARE PLAN
The patient's goals for the shift include pain control, bond w/ baby in NICU, get rest    The clinical goals for the shift include no s/sx HDP, meet postpartum milestones    VS stable throughout shift. No s/sx HDP. Meeting postpartum milestones; bleeding appropriate. Pain control improving w/ scheduled and PRN pain medications and abdominal binder. Ambulating within room, passing flatus, and voiding. Appetite increasing and no complaints of nausea. Bonding w/ infant in NICU.

## 2023-11-05 NOTE — LACTATION NOTE
"Lactation Consultant Note  Lactation Consultation  Reason for Consult: Initial assessment, NICU baby  Consultant Name: Ladonna Whitley RN, IBCLC    Maternal Information  Has mother  before?: Yes    Maternal Assessment  Breast Assessment: Medium, Compressible, Firm (very expressible bilaterally)  Nipple Assessment: Erect, Intact  Areola Assessment: Normal    Infant Assessment  Infant Behavior:  (infant in NICU)    Feeding Assessment       LATCH TOOL       Breast Pump  Pump: Hospital grade electric pump  Frequency: 3-4 times per day  Duration: Initiate phase  Breast Shield Size and Type: 24 mm, 21 mm  Units of Volume: Drops    Other OB Lactation Tools  Lactation Tools: Flanges    Patient Follow-up  Inpatient Lactation Follow-up Needed : Yes    Other OB Lactation Documentation  Maternal Risk Factors:  delivery, Preeclampsia, Diabetes (gestational, types 1 or 2)  Infant Risk Factors: Prematurity <37 weeks    Recommendations/Summary  Mother states she has \"probably not been pumping as much as I should\". Yesterday pumped about 4 times and twice so far today. Educated mother on use of pump including initiate program, goal of pumping 8-10 times in a 24 hour period, how to assess for proper flange size, proper cleaning of pump parts as well as milk storage guidelines. Mother due to pump now and willing to have me evaluate proper flange size as well as firm breasts.     Upon assessment, mother in between size 24 mm and 21 mm flanges. With smaller size, nipple rubs more on side of flange however no discomfort. I encouraged mother to test each out to see which one feels better and maybe even which one she sees more breastmilk output with. With mother's firm breasts, I suggested pumping one breast at a time until able to get a hands free bra, to use extra hand to massage and provide warm compresses while pumping. We discussed hand expression option as well.     Mother had a child last 5 years ago and " wanting a new breast pump through insurance. I will order a breast pump for mother per her request. Outpatient lactation resources discussed with mother. I encouraged mother to call for any questions, concerns or assistance.

## 2023-11-05 NOTE — PROGRESS NOTES
Postpartum Progress Note    Assessment/Plan   Gabrielle David is a 38 y.o.  POD#2 s/p RCS @ 31.1 wga for superimposed pre-eclampsia with severe features    Routine postop  - Recovering appropriately  - s/p Keflex/Flagyl x 48 hours  - PPBC: declines    sPEC  - dx'd based on severe-range Bps requiring IV antihypertensives  - BPs normotensive  - Continue Nifed 60 BID/Labetalol 800 TID  - Labs  drawn due to N/V on Mg notable for Cr 1.17, possibly secondary to sPEC vs component, PO hydration, will plan to recheck tomorrow    APLS  - Continue Lovenox 40 mg x 6 wks PP    Dispo: continue inpatient management    Discussed with Dr. Jude Ty MD  [facility] Pager [phone]     Subjective   Feeling significantly better following Mg. Ambulating. Tolerating PO without N/V. Pain well controlled. Attempting pumping. Denies HA, vision changes, CP/SOB, RUQ pain.    Objective     Vitals:    23 0644   BP: 112/64   Pulse: 65   Resp: 18   Temp: 37.1 °C (98.8 °F)   SpO2: (!) 95%        Physical Exam:  Physical exam:  General:  AAOx3, No acute distress  Cardiovascular: HR wnl  Respiratory: Normal respiratory effort  Abdominal:  Soft, non-tender, incision c/d/I, fundus firm below umbilicus  Extremities: Minimal LE edema  Skin: No rashes or lesions visualized  Neuro: Grossly intact  Psych: Appropriate affect    Lab Results   Component Value Date    WBC 8.0 2023    HGB 9.2 (L) 2023    HCT 27.8 (L) 2023     2023     Lab Results   Component Value Date    GLUCOSE 121 (H) 2023     (L) 2023    K 4.2 2023     2023    CO2 22 2023    ANIONGAP 13 2023    BUN 20 2023    CREATININE 1.17 (H) 2023    EGFR 61 2023    CALCIUM 6.7 (L) 2023    ALBUMIN 2.8 (L) 2023    PROT 5.0 (L) 2023    ALKPHOS 74 2023    ALT 10 2023    AST 11 2023    BILITOT 0.3 2023

## 2023-11-06 LAB
ALBUMIN SERPL BCP-MCNC: 3 G/DL (ref 3.4–5)
ALP SERPL-CCNC: 83 U/L (ref 33–110)
ALT SERPL W P-5'-P-CCNC: 17 U/L (ref 7–45)
ANION GAP SERPL CALC-SCNC: 13 MMOL/L (ref 10–20)
AST SERPL W P-5'-P-CCNC: 18 U/L (ref 9–39)
BILIRUB SERPL-MCNC: 0.3 MG/DL (ref 0–1.2)
BLOOD EXPIRATION DATE: NORMAL
BUN SERPL-MCNC: 12 MG/DL (ref 6–23)
CALCIUM SERPL-MCNC: 7.6 MG/DL (ref 8.6–10.6)
CHLORIDE SERPL-SCNC: 109 MMOL/L (ref 98–107)
CO2 SERPL-SCNC: 23 MMOL/L (ref 21–32)
CREAT SERPL-MCNC: 0.7 MG/DL (ref 0.5–1.05)
DISPENSE STATUS: NORMAL
GFR SERPL CREATININE-BSD FRML MDRD: >90 ML/MIN/1.73M*2
GLUCOSE SERPL-MCNC: 74 MG/DL (ref 74–99)
POTASSIUM SERPL-SCNC: 4.1 MMOL/L (ref 3.5–5.3)
PRODUCT BLOOD TYPE: 5100
PRODUCT CODE: NORMAL
PROT SERPL-MCNC: 5.7 G/DL (ref 6.4–8.2)
SODIUM SERPL-SCNC: 141 MMOL/L (ref 136–145)
UNIT ABO: NORMAL
UNIT NUMBER: NORMAL
UNIT RH: NORMAL
UNIT VOLUME: 350
XM INTEP: NORMAL

## 2023-11-06 PROCEDURE — 80053 COMPREHEN METABOLIC PANEL: CPT | Performed by: STUDENT IN AN ORGANIZED HEALTH CARE EDUCATION/TRAINING PROGRAM

## 2023-11-06 PROCEDURE — 1210000001 HC SEMI-PRIVATE ROOM DAILY

## 2023-11-06 PROCEDURE — 99232 SBSQ HOSP IP/OBS MODERATE 35: CPT

## 2023-11-06 PROCEDURE — 2500000004 HC RX 250 GENERAL PHARMACY W/ HCPCS (ALT 636 FOR OP/ED): Performed by: STUDENT IN AN ORGANIZED HEALTH CARE EDUCATION/TRAINING PROGRAM

## 2023-11-06 PROCEDURE — 2500000001 HC RX 250 WO HCPCS SELF ADMINISTERED DRUGS (ALT 637 FOR MEDICARE OP): Performed by: STUDENT IN AN ORGANIZED HEALTH CARE EDUCATION/TRAINING PROGRAM

## 2023-11-06 PROCEDURE — 2500000001 HC RX 250 WO HCPCS SELF ADMINISTERED DRUGS (ALT 637 FOR MEDICARE OP)

## 2023-11-06 PROCEDURE — 36415 COLL VENOUS BLD VENIPUNCTURE: CPT | Performed by: STUDENT IN AN ORGANIZED HEALTH CARE EDUCATION/TRAINING PROGRAM

## 2023-11-06 PROCEDURE — 96372 THER/PROPH/DIAG INJ SC/IM: CPT | Performed by: STUDENT IN AN ORGANIZED HEALTH CARE EDUCATION/TRAINING PROGRAM

## 2023-11-06 RX ORDER — FAMOTIDINE 20 MG/1
20 TABLET, FILM COATED ORAL 2 TIMES DAILY
Status: DISCONTINUED | OUTPATIENT
Start: 2023-11-06 | End: 2023-11-07 | Stop reason: HOSPADM

## 2023-11-06 RX ADMIN — FERROUS SULFATE TAB 325 MG (65 MG ELEMENTAL FE) 65 MG OF IRON: 325 (65 FE) TAB at 08:58

## 2023-11-06 RX ADMIN — ACETAMINOPHEN 975 MG: 325 TABLET ORAL at 15:03

## 2023-11-06 RX ADMIN — LABETALOL HYDROCHLORIDE 800 MG: 200 TABLET, FILM COATED ORAL at 23:13

## 2023-11-06 RX ADMIN — FAMOTIDINE 20 MG: 20 TABLET, FILM COATED ORAL at 21:47

## 2023-11-06 RX ADMIN — IBUPROFEN 600 MG: 600 TABLET, FILM COATED ORAL at 21:48

## 2023-11-06 RX ADMIN — IBUPROFEN 600 MG: 600 TABLET, FILM COATED ORAL at 02:19

## 2023-11-06 RX ADMIN — ACETAMINOPHEN 975 MG: 325 TABLET ORAL at 21:47

## 2023-11-06 RX ADMIN — IBUPROFEN 600 MG: 600 TABLET, FILM COATED ORAL at 15:03

## 2023-11-06 RX ADMIN — LABETALOL HYDROCHLORIDE 800 MG: 200 TABLET, FILM COATED ORAL at 07:40

## 2023-11-06 RX ADMIN — FAMOTIDINE 20 MG: 20 TABLET, FILM COATED ORAL at 02:31

## 2023-11-06 RX ADMIN — FAMOTIDINE 20 MG: 20 TABLET, FILM COATED ORAL at 08:58

## 2023-11-06 RX ADMIN — LABETALOL HYDROCHLORIDE 800 MG: 200 TABLET, FILM COATED ORAL at 15:03

## 2023-11-06 RX ADMIN — OXYCODONE HYDROCHLORIDE 5 MG: 5 TABLET ORAL at 20:13

## 2023-11-06 RX ADMIN — ENOXAPARIN SODIUM 40 MG: 100 INJECTION SUBCUTANEOUS at 07:40

## 2023-11-06 RX ADMIN — OXYCODONE HYDROCHLORIDE 5 MG: 5 TABLET ORAL at 10:27

## 2023-11-06 RX ADMIN — NIFEDIPINE 60 MG: 60 TABLET, EXTENDED RELEASE ORAL at 21:47

## 2023-11-06 RX ADMIN — ACETAMINOPHEN 975 MG: 325 TABLET ORAL at 08:58

## 2023-11-06 RX ADMIN — NIFEDIPINE 60 MG: 60 TABLET, EXTENDED RELEASE ORAL at 08:59

## 2023-11-06 RX ADMIN — IBUPROFEN 600 MG: 600 TABLET, FILM COATED ORAL at 08:58

## 2023-11-06 RX ADMIN — ACETAMINOPHEN 975 MG: 325 TABLET ORAL at 02:18

## 2023-11-06 ASSESSMENT — PAIN SCALES - GENERAL
PAINLEVEL_OUTOF10: 6
PAINLEVEL_OUTOF10: 6
PAINLEVEL_OUTOF10: 7
PAINLEVEL_OUTOF10: 0 - NO PAIN
PAINLEVEL_OUTOF10: 3
PAINLEVEL_OUTOF10: 4
PAINLEVEL_OUTOF10: 6

## 2023-11-06 ASSESSMENT — PAIN - FUNCTIONAL ASSESSMENT
PAIN_FUNCTIONAL_ASSESSMENT: 0-10

## 2023-11-06 ASSESSMENT — PAIN DESCRIPTION - LOCATION
LOCATION: ABDOMEN
LOCATION: ABDOMEN

## 2023-11-06 ASSESSMENT — PAIN DESCRIPTION - DESCRIPTORS
DESCRIPTORS: ACHING;SORE
DESCRIPTORS: ACHING;SORE
DESCRIPTORS: ACHING;SORE;SHARP

## 2023-11-06 ASSESSMENT — PAIN DESCRIPTION - ORIENTATION
ORIENTATION: LOWER
ORIENTATION: LOWER

## 2023-11-06 NOTE — LACTATION NOTE
Lactation Consultant Note  Lactation Consultation  Reason for Consult: Follow-up assessment  Consultant Name: Kira Becerra RN, IBCLC    Maternal Information       Maternal Assessment       Infant Assessment       Feeding Assessment  Unable to assess infant feeding at this time: Other (Comment) (Infant remains in the NICU)    LATCH TOOL       Breast Pump  Pump: Hospital grade electric pump, Double breast pumping (encouraged breast massage prior to pumping)  Frequency: 5-7 times per day  Duration: Initiate phase  Volume of Milk Production: 15  Units of Volume: mL per session    Other OB Lactation Tools       Patient Follow-up  Inpatient Lactation Follow-up Needed : No (follow up as needed)    Other OB Lactation Documentation  Maternal Risk Factors: Hypertension, Diabetes (gestational, types 1 or 2), Other (comment),  delivery <37 weeks (baby in the NICU)  Infant Risk Factors: Prematurity <37 weeks, Infant Apgar <8, Low birth weight <2500 g    Recommendations/Summary  Called to room for questions regarding a breast pump for at home.   Weekend RN, IBCLC ordered her a breast pump through [facility] [employer] [employer] in [place] and at her request ordered a Spectra pump.   Mom stated she received a text on  her phone to send her insurance information via text- mom was reluctant to do this b/c  lactation had already sent her insurance information to them.   I attempted to call [employer] [employer] [facility], however, there was no answer. Message was left for company to reach back out to this patient regarding the insurance information that is still needed to fulfill her order.   RN notified to let the pt. Know she can send the information to this company d/t the no answer.     NICU lactation also spoke to mom about [employer] pump rental for home going, if needed/ wanted.     Mom stated she is pumping about 6 x a day and obtaining 15-20 ML at a time.   Encouraged her to increase the frequency, if able to 8-12  times in a 24 hour period or very 3 hours.   Mom neither receptive to this information or dismissive.       Encouraged mom to call for additional assistance, if needed.

## 2023-11-06 NOTE — PROGRESS NOTES
"Postpartum PROGRESS NOTE   2023, 7:03 AM     SUBJECTIVE: No acute events overnight. Patient has no complaints this morning. Denies HA, vision changes, CP, SOB, RUQ or epigastric pain. States lochia is minimal. Denies abdominal pain. Ambulating, tolerating diet, passing gas, voiding without difficulty.    OBJECTIVE:   /76   Pulse 63   Temp 37.1 °C (98.8 °F) (Temporal)   Resp 18   Ht 1.575 m (5' 2\")   Wt 89.7 kg (197 lb 12 oz)   LMP 2023   SpO2 96%   Breastfeeding Yes   BMI 36.17 kg/m²    Temp  Min: 36.4 °C (97.5 °F)  Max: 37.1 °C (98.8 °F)  Pulse  Min: 60  Max: 70  BP  Min: 103/64  Max: 125/76    General:  AAOx3, No acute distress  Cardiovascular: HR wnl  Respiratory: Normal respiratory effort  Abdominal:  Soft, non-tender, incision c/d/I, fundus firm below umbilicus  Extremities: Minimal LE edema  Skin: No rashes or lesions visualized  Neuro: Grossly intact  Psych: Appropriate affect      Labs:   Results for orders placed or performed during the hospital encounter of 10/20/23 (from the past 24 hour(s))   CBC   Result Value Ref Range    WBC 8.0 4.4 - 11.3 x10*3/uL    nRBC 0.0 0.0 - 0.0 /100 WBCs    RBC 2.87 (L) 4.00 - 5.20 x10*6/uL    Hemoglobin 9.2 (L) 12.0 - 16.0 g/dL    Hematocrit 27.8 (L) 36.0 - 46.0 %    MCV 97 80 - 100 fL    MCH 32.1 26.0 - 34.0 pg    MCHC 33.1 32.0 - 36.0 g/dL    RDW 14.8 (H) 11.5 - 14.5 %    Platelets 176 150 - 450 x10*3/uL         ASSESSMENT AND PLAN:     38 y.o.  POD#3 s/p RCS @ 31.1 wga for superimposed pre-eclampsia with severe features     Routine postop  - Recovering appropriately  - s/p Keflex/Flagyl x 48 hours  - PPBC: declines     sPEC  - dx'd based on severe-range Bps requiring IV antihypertensives  - BPs normotensive  - Continue Nifed 60 BID/Labetalol 800 TID  - Labs  drawn due to N/V on Mg notable for Cr 1.17, possibly secondary to sPEC vs component, PO hydration, will plan to recheck today, pending     APLS  - Continue Lovenox 40 mg x 6 wks " PP     Dispo: continue inpatient management     Pt seen and discussed with MFM Attending, Dr. Fritz.     Maria C Lpiscomb MD   [facility]  Pager [phone]     Principal Problem:    Chronic hypertension with superimposed preeclampsia  Active Problems:    History of  section, classical    Insulin controlled gestational diabetes mellitus (GDM) in third trimester    History of IUFD    Antiphospholipid antibody syndrome complicating pregnancy (CMS/HCC)    Recurrent urinary tract infection affecting pregnancy in second trimester    Poor fetal growth affecting management of mother in third trimester    Group B streptococcal infection during pregnancy    Gastroesophageal reflux disease without esophagitis

## 2023-11-06 NOTE — PROGRESS NOTES
Gabrielle David is a 38 y.o. female, admitted for observation due to severe pre-e. Gave birth to her infant Sabas at 31 wga.  Infant was transferred to the NICU for prematurity, currently intubated, planned extubation today.     SW met with Mrs. David and her  Brandon at bedside to assess for needs and provide support.  Parents live together with their 5 and 3 yo children.  Grandparents and other relatives live in town to assist with childcare.  Discuss options for children to be in the hospital (such as [facility] and the [facility]) if needed.     Mom is employed, dad is retired, they deny any specific financial concerns and confirm they have everything for infant at home.     Parents are coping well with the admission, given the stressful situation.  Mom denies any hx of PPA/PPD, informed her of supports within the hospital for NICU moms.  Denies any concerns at this time.     Parents plan to utilize [facility] for pediatric care.    Provided parents with information on [facility] (and application) and information on the [employer] [facility] and parking resources for extended family. Provided parents with a 30 day parking pass.       MANOJ Horn

## 2023-11-06 NOTE — LACTATION NOTE
This note was copied from a baby's chart.  Lactation Consultant Note    Recommendations/Summary       I spoke to mom at pt's bedside to  explained availability of Lactation Consult services. Provided patient instruction materials and use of [employer] electric breast pump.  Mom reports that she was concerned about what type of breast pump was ordered for her over at [facility].  She was seen there over the weekend and ordered her home pump.  Upon discussion with [facility], it was noted that the pump mom requested was correctly ordered. Mom was provided the contact information for the Medical supply company that her pump was ordered through.  She was instructed to phone them and have them reviewed what types of pumps they could provided as she wanted a portable option.  Mom was provided information on the  [employer] program as well.  Her baby is small and she will be pump dependent for a while.  Mom is getting 15-20 ml of milk with each effort.  She had no further questions for Lactation at this time.  Invited to contact Lactation Consult services as needed.

## 2023-11-06 NOTE — CARE PLAN
The patient's goals for the shift include pain control, bond w/ baby    The clinical goals for the shift include no s/sx HDP, remains normotensive, pain control, meet postpartum milestones    VS stable throughout shift. Meeting postpartum milestones: ambulating to NICU, bonding w/ infant, pumping, voiding, passing flatus, lochia and fundal assessments appropriate. Pain control improving w/ scheduled and PRN pain regimen. Normotensive overnight w/ no s/sx of HDP.

## 2023-11-07 ENCOUNTER — PHARMACY VISIT (OUTPATIENT)
Dept: PHARMACY | Facility: CLINIC | Age: 38
End: 2023-11-07

## 2023-11-07 ENCOUNTER — LACTATION ENCOUNTER (OUTPATIENT)
Age: 38
End: 2023-11-07

## 2023-11-07 VITALS
TEMPERATURE: 99 F | OXYGEN SATURATION: 97 % | HEIGHT: 62 IN | HEART RATE: 73 BPM | SYSTOLIC BLOOD PRESSURE: 122 MMHG | WEIGHT: 197.75 LBS | BODY MASS INDEX: 36.39 KG/M2 | DIASTOLIC BLOOD PRESSURE: 79 MMHG | RESPIRATION RATE: 21 BRPM

## 2023-11-07 PROCEDURE — 2500000001 HC RX 250 WO HCPCS SELF ADMINISTERED DRUGS (ALT 637 FOR MEDICARE OP): Performed by: STUDENT IN AN ORGANIZED HEALTH CARE EDUCATION/TRAINING PROGRAM

## 2023-11-07 PROCEDURE — 96372 THER/PROPH/DIAG INJ SC/IM: CPT | Performed by: STUDENT IN AN ORGANIZED HEALTH CARE EDUCATION/TRAINING PROGRAM

## 2023-11-07 PROCEDURE — 2500000004 HC RX 250 GENERAL PHARMACY W/ HCPCS (ALT 636 FOR OP/ED): Performed by: STUDENT IN AN ORGANIZED HEALTH CARE EDUCATION/TRAINING PROGRAM

## 2023-11-07 PROCEDURE — 99238 HOSP IP/OBS DSCHRG MGMT 30/<: CPT

## 2023-11-07 PROCEDURE — 2500000001 HC RX 250 WO HCPCS SELF ADMINISTERED DRUGS (ALT 637 FOR MEDICARE OP)

## 2023-11-07 RX ORDER — ENOXAPARIN SODIUM 100 MG/ML
40 INJECTION SUBCUTANEOUS DAILY
Qty: 42 EACH | Refills: 0 | Status: SHIPPED | OUTPATIENT
Start: 2023-11-07 | End: 2023-12-19

## 2023-11-07 RX ORDER — ACETAMINOPHEN 500 MG
1000 TABLET ORAL EVERY 6 HOURS PRN
Qty: 30 TABLET | Refills: 0 | Status: SHIPPED | OUTPATIENT
Start: 2023-11-07 | End: 2023-12-07

## 2023-11-07 RX ORDER — LABETALOL 200 MG/1
1000 TABLET, FILM COATED ORAL 2 TIMES DAILY
Qty: 300 TABLET | Refills: 0 | Status: SHIPPED | OUTPATIENT
Start: 2023-11-07 | End: 2023-12-07

## 2023-11-07 RX ORDER — IBUPROFEN 600 MG/1
600 TABLET ORAL EVERY 6 HOURS PRN
Qty: 90 TABLET | Refills: 0 | Status: SHIPPED | OUTPATIENT
Start: 2023-11-07 | End: 2024-01-06

## 2023-11-07 RX ORDER — OXYCODONE HYDROCHLORIDE 5 MG/1
5 TABLET ORAL EVERY 6 HOURS PRN
Qty: 15 TABLET | Refills: 0 | Status: SHIPPED | OUTPATIENT
Start: 2023-11-07

## 2023-11-07 RX ORDER — FERROUS SULFATE 325(65) MG
65 TABLET ORAL
Qty: 60 TABLET | Refills: 0 | Status: SHIPPED | OUTPATIENT
Start: 2023-11-07 | End: 2024-01-06

## 2023-11-07 RX ORDER — POLYETHYLENE GLYCOL 3350 17 G/17G
17 POWDER, FOR SOLUTION ORAL DAILY
Qty: 510 G | Refills: 0 | Status: SHIPPED | OUTPATIENT
Start: 2023-11-07 | End: 2023-12-07

## 2023-11-07 RX ORDER — ACETAMINOPHEN 325 MG/1
650 TABLET ORAL EVERY 6 HOURS PRN
Qty: 60 TABLET | Refills: 0 | Status: SHIPPED | OUTPATIENT
Start: 2023-11-07 | End: 2023-12-07

## 2023-11-07 RX ORDER — OXYCODONE HYDROCHLORIDE 5 MG/1
5 TABLET ORAL EVERY 4 HOURS PRN
Qty: 15 TABLET | Refills: 0 | Status: SHIPPED | OUTPATIENT
Start: 2023-11-07 | End: 2023-11-22

## 2023-11-07 RX ORDER — NIFEDIPINE 60 MG/1
60 TABLET, FILM COATED, EXTENDED RELEASE ORAL 2 TIMES DAILY
Qty: 60 TABLET | Refills: 1 | Status: SHIPPED | OUTPATIENT
Start: 2023-11-07 | End: 2024-01-06

## 2023-11-07 RX ORDER — LABETALOL 200 MG/1
1000 TABLET, FILM COATED ORAL 2 TIMES DAILY
Qty: 300 TABLET | Refills: 1 | Status: SHIPPED | OUTPATIENT
Start: 2023-11-07 | End: 2024-01-06

## 2023-11-07 RX ORDER — ENOXAPARIN SODIUM 100 MG/ML
40 INJECTION SUBCUTANEOUS DAILY
Qty: 41 EACH | Refills: 0 | Status: SHIPPED | OUTPATIENT
Start: 2023-11-07 | End: 2023-12-18

## 2023-11-07 RX ORDER — IBUPROFEN 600 MG/1
600 TABLET ORAL EVERY 6 HOURS
Qty: 120 TABLET | Refills: 0 | Status: SHIPPED | OUTPATIENT
Start: 2023-11-07 | End: 2023-12-07

## 2023-11-07 RX ORDER — NIFEDIPINE 60 MG/1
60 TABLET, FILM COATED, EXTENDED RELEASE ORAL 2 TIMES DAILY
Qty: 60 TABLET | Refills: 0 | Status: SHIPPED | OUTPATIENT
Start: 2023-11-07 | End: 2023-12-07

## 2023-11-07 RX ORDER — POLYETHYLENE GLYCOL 3350 17 G/17G
17 POWDER, FOR SOLUTION ORAL 2 TIMES DAILY PRN
Qty: 60 PACKET | Refills: 0 | Status: SHIPPED | OUTPATIENT
Start: 2023-11-07 | End: 2023-12-07

## 2023-11-07 RX ADMIN — OXYCODONE HYDROCHLORIDE 5 MG: 5 TABLET ORAL at 06:54

## 2023-11-07 RX ADMIN — ENOXAPARIN SODIUM 40 MG: 100 INJECTION SUBCUTANEOUS at 08:47

## 2023-11-07 RX ADMIN — FERROUS SULFATE TAB 325 MG (65 MG ELEMENTAL FE) 65 MG OF IRON: 325 (65 FE) TAB at 08:47

## 2023-11-07 RX ADMIN — ACETAMINOPHEN 975 MG: 325 TABLET ORAL at 10:29

## 2023-11-07 RX ADMIN — IBUPROFEN 600 MG: 600 TABLET, FILM COATED ORAL at 10:28

## 2023-11-07 RX ADMIN — NIFEDIPINE 60 MG: 60 TABLET, EXTENDED RELEASE ORAL at 08:47

## 2023-11-07 RX ADMIN — LABETALOL HYDROCHLORIDE 800 MG: 200 TABLET, FILM COATED ORAL at 06:43

## 2023-11-07 RX ADMIN — IBUPROFEN 600 MG: 600 TABLET, FILM COATED ORAL at 03:44

## 2023-11-07 RX ADMIN — FAMOTIDINE 20 MG: 20 TABLET, FILM COATED ORAL at 08:47

## 2023-11-07 RX ADMIN — ACETAMINOPHEN 975 MG: 325 TABLET ORAL at 03:44

## 2023-11-07 ASSESSMENT — PAIN - FUNCTIONAL ASSESSMENT
PAIN_FUNCTIONAL_ASSESSMENT: 0-10
PAIN_FUNCTIONAL_ASSESSMENT: 0-10

## 2023-11-07 ASSESSMENT — PAIN SCALES - GENERAL
PAINLEVEL_OUTOF10: 7
PAINLEVEL_OUTOF10: 4
PAINLEVEL_OUTOF10: 0 - NO PAIN
PAINLEVEL_OUTOF10: 4

## 2023-11-07 ASSESSMENT — PAIN DESCRIPTION - LOCATION
LOCATION: ABDOMEN
LOCATION: ABDOMEN

## 2023-11-07 ASSESSMENT — PAIN DESCRIPTION - ORIENTATION: ORIENTATION: RIGHT;LOWER

## 2023-11-07 NOTE — CARE PLAN
The patient's goals for the shift include pain control    The clinical goals for the shift include adequate pain managment, remain normal tensive    VS remain stable. Pt free of falls/injury. No s/s of infection. Pain well managed. No OB complaints at this time. Pt resting comfortably in room. Denies needs at this time.

## 2023-11-07 NOTE — CARE PLAN
The patient's goals for the shift include pain control    The clinical goals for the shift include No S/Sx of HDP, pain control    O  Problem: Hypertensive Disorder of Pregnancy (HDP)  Goal: Minimal s/sx of HDP and BP<160/110  Outcome: Progressing  Goal: Adequate urine output (0.5 ml/kg/hr)  Outcome: Progressing     Problem: Pain - Adult  Goal: Verbalizes/displays adequate comfort level or baseline comfort level  Outcome: Progressing     Problem: Safety - Adult  Goal: Free from fall injury  Outcome: Progressing     Problem: Discharge Planning  Goal: Discharge to home or other facility with appropriate resources  Outcome: Progressing     Problem: Chronic Conditions and Co-morbidities  Goal: Patient's chronic conditions and co-morbidity symptoms are monitored and maintained or improved  Outcome: Progressing     Problem: Postpartum  Goal: Experiences normal postpartum course  Outcome: Progressing  Goal: Appropriate maternal -  bonding  Outcome: Progressing  Goal: Establish and maintain infant feeding pattern for adequate nutrition  Outcome: Progressing  Goal: Incisions, wounds, or drain sites healing without S/S of infection  Outcome: Progressing  Goal: No s/sx infection  Outcome: Progressing  Goal: No s/sx of hemorrhage  Outcome: Progressing  Goal: Minimal s/sx of HDP and BP<160/110  Outcome: Progressing     Problem: Nausea/Vomiting  Goal: Free from nausea/vomiting  Outcome: Progressing  Goal: Tolerates prescribed diet  Outcome: Progressing     Problem: Skin  Goal: Participates in plan/prevention/treatment measures  Outcome: Progressing  Goal: Prevent/manage excess moisture  Outcome: Progressing  Goal: Promote/optimize nutrition  Outcome: Progressing  Goal: Promote skin healing  Outcome: Progressing     Problem: Pain  Goal: Takes deep breaths with improved pain control throughout the shift  Outcome: Progressing  Goal: Turns in bed with improved pain control throughout the shift  Outcome: Progressing  Goal:  Walks with improved pain control throughout the shift  Outcome: Progressing  Goal: Performs ADL's with improved pain control throughout shift  Outcome: Progressing

## 2023-11-07 NOTE — LACTATION NOTE
This note was copied from a baby's chart.  Lactation Consultant Note    Recommendations/Summary       I spoke with mom at pt's bedside to discuss her pump for home situation.  Mom had her pump ordered at [facility] over the weekend from a company not familiar to [facility] lactation services.  Mom was provide the contact information for this company.  Today she reports that when she contacted them, they told her that they could not read her insurance information and she did not feel comfortable sharing this over the phone. I obtained a copy of mom's face sheet in the paper chart and had her review the insurance information.  Mom reports that this information is incorrect.  I discussed with mom that this is most probably the information that was shared with the Medical supply company.  Without her correct insurance information, they will not send her a pump. She was going to reach out to them again and provide them the information needed to fill this request.  Should they not be able to clear this up, I will obtain mom's insurance information and send the order to a different company. In the meantime, Mom was provided a easton pump for home use.  She may use this pump for the duration of the  baby's hospital stay.  Mom is getting 30 ml combined with each pumping.  This is on target for bringing in her supply as she is only 4 to 5 days post delivery.  Mom was sent home with pumping supplies. She was invited to follow up with Lactation services as needed.

## 2023-11-07 NOTE — DISCHARGE SUMMARY
Discharge Summary    Admission Date: 10/20/2023  Discharge Date: 23    Discharge Diagnosis  Chronic hypertension with superimposed preeclampsia    Hospital Course  Delivery Date: 11/3/2023  12:21 PM   Delivery type: , Low Transverse    GA at delivery: 31w1d  Outcome: Living   Anesthesia during delivery: Combined spinal-epidural   Intrapartum complications: None   Feeding method: Breastfeeding Status: Yes     Procedures:  CS as above  Contraception at discharge: none    Patient underwent uncomplicated rCS on 11/3/23. See operative note for full details. She was admitted to the [facility] service leading up to her CS for siPEC w/ SF, and she was uptitrated to max nifedipine and labetalol and due to uptitration of medication the decision was made to proceed with delivery on 11/3. She also had GDM on insulin, and APLS on lovenox. Her post-operative course was uncomplicated. By time of discharge, she was meeting all milestones; pain was well-controlled, and she was voiding, ambulating, and tolerating a regular diet. She was discharged to home with plans to follow-up in 1 week for an incision check and BP check. She was discharged with 6 wks lovenox to take postpartum.   Discharge BP regimen: Nifed 60 BID and labetalol 1000 BID.      Pertinent Physical Exam At Time of Discharge  General:  AAOx3, No acute distress  Cardiovascular: HR wnl  Respiratory: Normal respiratory effort  Abdominal:  Soft, non-tender, incision c/d/I, fundus firm below umbilicus  Extremities: Minimal LE edema  Skin: No rashes or lesions visualized  Neuro: Grossly intact  Psych: Appropriate affect    Discharge Meds     Your medication list        START taking these medications        Instructions Last Dose Given Next Dose Due   ferrous sulfate 325 (65 Fe) MG tablet      Take 1 tablet (65 mg of iron) by mouth once daily with a meal.       ibuprofen 600 mg tablet      Take 1 tablet (600 mg) by mouth every 6 hours.       labetalol 200 mg  tablet  Commonly known as: Normodyne      Take 5 tablets (1,000 mg) by mouth 2 times a day.       oxyCODONE 5 mg immediate release tablet  Commonly known as: Roxicodone      Take 1 tablet (5 mg) by mouth every 4 hours if needed (Pain score 6-8) for up to 15 days.       polyethylene glycol 17 gram packet  Commonly known as: Glycolax, Miralax      Take 17 g by mouth 2 times a day as needed (first line).              CHANGE how you take these medications        Instructions Last Dose Given Next Dose Due   acetaminophen 325 mg tablet  Commonly known as: Tylenol  What changed:   medication strength  how much to take  reasons to take this      Take 2 tablets (650 mg) by mouth every 6 hours if needed for mild pain (1 - 3).              CONTINUE taking these medications        Instructions Last Dose Given Next Dose Due   betamethasone dipropionate 0.05 % lotion  Commonly known as: Diprosone           Dexcom G6 Sensor device  Generic drug: blood-glucose sensor           Dexcom G6 Transmitter device  Generic drug: Dexcom G4 platinum transmitter           enoxaparin 40 mg/0.4 mL syringe  Commonly known as: Lovenox      Inject 0.4 mL (40 mg) under the skin once daily.       NIFEdipine ER 60 mg 24 hr tablet  Commonly known as: Adalat CC      Take 1 tablet (60 mg) by mouth 2 times a day. Do not crush, chew, or split.              STOP taking these medications      aspirin 81 mg capsule        cholecalciferol 25 MCG (1000 UT) capsule  Commonly known as: Vitamin D3                  Where to Get Your Medications        These medications were sent to [facility]  [address] [place] [address]      Hours: 8:30 AM to 5 PM Mon-Fri Phone: [phone]   acetaminophen 325 mg tablet  enoxaparin 40 mg/0.4 mL syringe  ferrous sulfate 325 (65 Fe) MG tablet  ibuprofen 600 mg tablet  labetalol 200 mg tablet  NIFEdipine ER 60 mg 24 hr tablet  oxyCODONE 5 mg immediate release tablet  polyethylene glycol 17 gram packet           Complications Requiring Follow-Up  none    Test Results Pending At Discharge  Pending Labs       Order Current Status    Surgical Pathology Exam - [facility] In process            Outpatient Follow-Up  No future appointments.  Tasked to schedule 1 week incision check and BP check.      Maria C Lipscomb MD

## 2023-11-08 LAB
LABORATORY COMMENT REPORT: NORMAL
PATH REPORT.FINAL DX SPEC: NORMAL
PATH REPORT.GROSS SPEC: NORMAL
PATH REPORT.RELEVANT HX SPEC: NORMAL
PATH REPORT.TOTAL CANCER: NORMAL

## 2023-11-08 NOTE — SIGNIFICANT EVENT
Follow-up Phone Call Note:   Interview:  Care Type: Women's Health   Phone Number Call  [phone]   Call Outcome: Connected with patient   Patient Reports Feeling (symptoms) Are: better   Patient Has a Primary Care Provider:     Yes   Post-hospitalization Follow-up Occurred According To Schedule:    No   Reason: appointment made for a later date   Delivered Baby(ies): Yes   Chest Pain:  No   Shortness of breath or difficulty breathing:  No   Seizures:  No   Any thoughts of hurting yourself or your baby:   No   Bleeding that is soaking through one pad/hour or blood clots the size of an egg or bigger:  No   Incision that is not healing:  No   Red or swollen leg that is painful or warm to the touch:  No   Temperature of 100.4F or higher:  No   Headache that does not get better, even after taking medicine, or a bad headache with vision changes:  No   Where or in what is your baby sleeping?: NICU   Date/Time Of Call: 11/8/23 @ 1528   Call Back Done By: care coordinator   Callback Complete:  Yes

## 2023-11-09 ENCOUNTER — LACTATION ENCOUNTER (OUTPATIENT)
Age: 38
End: 2023-11-09

## 2023-11-09 LAB — REFLEX ADDED, ANEMIA PANEL: NORMAL

## 2023-11-09 NOTE — CARE PLAN
The patient's goals for the shift include follow medication plan    The clinical goals for the shift include blood sugar regulation    Over the shift, patient maintaining stable blood sugars. NST completed and reactive. Patient denies pain. Vital signs remain stable.   General Sunscreen Counseling: I recommended a broad spectrum sunscreen with a SPF of 30 or higher.  I explained that SPF 30 sunscreens block approximately 97 percent of the sun's harmful rays.  Sunscreens should be applied at least 15 minutes prior to expected sun exposure and then every 2 hours after that as long as sun exposure continues. If swimming or exercising sunscreen should be reapplied every 45 minutes to an hour after getting wet or sweating.  One ounce, or the equivalent of a shot glass full of sunscreen, is adequate to protect the skin not covered by a bathing suit. I also recommended a lip balm with a sunscreen as well. Sun protective clothing can be used in lieu of sunscreen but must be worn the entire time you are exposed to the sun's rays. Detail Level: Generalized

## 2023-11-09 NOTE — LACTATION NOTE
This note was copied from a baby's chart.  Lactation Consultant Note  Lactation Consultation       Maternal Information       Maternal Assessment       Infant Assessment       Feeding Assessment       LATCH TOOL       Breast Pump       Other OB Lactation Tools       Patient Follow-up       Other OB Lactation Documentation       Recommendations/Summary  Met with parents. Mom reports [employer] did not cover her home going breastpump insurance company. Spectra breastpump re-ordered thru [employer]. Mom enrolled in [employer] for meals. Dad providing ELIO. Invited to contact  services as needed.

## 2023-11-10 ENCOUNTER — LACTATION ENCOUNTER (OUTPATIENT)
Age: 38
End: 2023-11-10

## 2023-11-10 NOTE — LACTATION NOTE
This note was copied from a baby's chart.  Lactation Consultant Note  Lactation Consultation       Maternal Information       Maternal Assessment       Infant Assessment       Feeding Assessment       LATCH TOOL       Breast Pump       Other OB Lactation Tools       Patient Follow-up       Other OB Lactation Documentation       Recommendations/Summary  Met with Mom. She reports she has not received ordered breastpump -advised to contact DME provider-but has easton pump.  Mom reports she pumps 10-15 ml from each side with pumping every few hours. Mom instructed on breast massage & hand expression. Invited to contact LC services as needed.

## 2023-11-13 ENCOUNTER — LACTATION ENCOUNTER (OUTPATIENT)
Age: 38
End: 2023-11-13

## 2023-11-13 ENCOUNTER — CLINICAL SUPPORT (OUTPATIENT)
Dept: OBSTETRICS AND GYNECOLOGY | Facility: HOSPITAL | Age: 38
End: 2023-11-13
Payer: OTHER GOVERNMENT

## 2023-11-13 ENCOUNTER — APPOINTMENT (OUTPATIENT)
Dept: OBSTETRICS AND GYNECOLOGY | Facility: CLINIC | Age: 38
End: 2023-11-13
Payer: OTHER GOVERNMENT

## 2023-11-13 VITALS — DIASTOLIC BLOOD PRESSURE: 54 MMHG | WEIGHT: 184 LBS | SYSTOLIC BLOOD PRESSURE: 85 MMHG | BODY MASS INDEX: 33.65 KG/M2

## 2023-11-13 DIAGNOSIS — Z01.30 BP CHECK: ICD-10-CM

## 2023-11-13 DIAGNOSIS — Z51.89 VISIT FOR WOUND CHECK: ICD-10-CM

## 2023-11-13 PROCEDURE — G0463 HOSPITAL OUTPT CLINIC VISIT: HCPCS | Performed by: STUDENT IN AN ORGANIZED HEALTH CARE EDUCATION/TRAINING PROGRAM

## 2023-11-13 PROCEDURE — 99211 OFF/OP EST MAY X REQ PHY/QHP: CPT | Performed by: STUDENT IN AN ORGANIZED HEALTH CARE EDUCATION/TRAINING PROGRAM

## 2023-11-13 ASSESSMENT — PAIN SCALES - GENERAL: PAINLEVEL: 7

## 2023-11-13 NOTE — PROGRESS NOTES
Patient here for BP  check/incision check    Patient denies fevers, chills, odor, swelling, or redness  Patient states she has (+) gas, (+) BM, and (+) urination  Patient denies any pain and states that she is currently taking IBU & Tylenol as ordered.  Patient denies any dizziness, lightheadedness, h/a, blurred vision, or RUQ pain  Patient states that she feels as though her blood pressure is low  Patient denies any symptoms of depression, suicidal or homicidal ideation    Incision appears well intact without any drainage, redness or odor noted at this time. Of note there is a small area that appears to be slight in red in color, free of drainage and continues to heal as expected. Patient educated to continue to clean incision as educated at time of discharge, allow the area to dry after showering and allow for exposure to air to help with healing.   Discussed S&S of infection with pt and informed when to call office  Patient aware to schedule PPV  Patient verbalized understanding  Patient denies any further questions or concerns at this time      Patient here for PP BP check  BP's at home: 110s/70s in the morning  Patient is taking 1000 mg of Labetalol twice daily and 60 mg of Nifedipine twice daily.  Denies headache, lightheadedness, dizziness, blurred vision, or pain in the upper right quadrant.   RN discussed blood pressure in office and patient concerns with Dr Kimble, patient educated that provider is recommending that she take 200 mg twice daily, if her bp in the morning is below 120/80 she is not to take the medication.  Patient educated to continue to check her BP at home   Patient verbalized understanding and all questions and concerns were addressed.

## 2023-11-13 NOTE — LACTATION NOTE
This note was copied from a baby's chart.  Lactation Consultant Note  Lactation Consultation       Maternal Information       Maternal Assessment       Infant Assessment       Feeding Assessment       LATCH TOOL       Breast Pump       Other OB Lactation Tools   Mom has easton pump for home use.    Patient Follow-up       Other OB Lactation Documentation       Recommendations/Summary  Met with Mom. She reports she has not received pump for home. Advised to contact [employer] since she was given completed order form on 11-9-23. Mom has easton pump for home use. Mom reports she is pumping comfortably ~ every 3 hours & collects 25-30 ml with each effort. Advised to massage breasts during pumping pump & to use hand expression. Mom providing ELIO. Invited to contact LC services as needed.

## 2023-11-16 ENCOUNTER — LACTATION ENCOUNTER (OUTPATIENT)
Age: 38
End: 2023-11-16

## 2023-11-16 NOTE — LACTATION NOTE
This note was copied from a baby's chart.  Recommendations/Summary              I spoke with mom at pt's bedside to see if she received her pump for home use.  Mom reports that her insurance was denied by the second company we utilized to obtain a pump.  Mom was instructed to contact her insurance company directly to see what medical supply companied they work with.  Mom is currently using the easton pump at home and has a good milk supply.  Mom was encouraged to follow up with lactation services as needed.   DASH Diet Regular Diet - No restrictions

## 2023-11-17 ENCOUNTER — LACTATION ENCOUNTER (OUTPATIENT)
Age: 38
End: 2023-11-17

## 2023-11-17 NOTE — LACTATION NOTE
This note was copied from a baby's chart.  Lactation Consultant Note  Lactation Consultation  Reason for Consult: NICU baby  Consultant Name: Racquel Juares    Maternal Information       Maternal Assessment  Breast Assessment: Small  Nipple Assessment: Intact  Areola Assessment: Normal    Infant Assessment  Infant Behavior: Sleepy    Feeding Assessment  Feeding Method: Nursing at the breast  Feeding Position: Cross - cradle  Suck/Feeding: Does not suckle  Latch Assessment: Too sleepy    LATCH TOOL  Latch: Too sleepy or reluctant, no latch achieved  LATCH Score: 0    Breast Pump  Pump: Hospital grade electric pump  Frequency: 5-7 times per day  Duration: 15-20 minutes per session  Breast Shield Size and Type: 21 mm    Other OB Lactation Tools       Patient Follow-up       Other OB Lactation Documentation       Recommendations/Summary  Mom states that she sleeps overnight, and pumps every 3-4 hrs. Encouraged mom to try and pump every 2-3 hrs and get up at least once overnight to help sustain her milk for long term use. Mom is busy with her 2yr old and 5 yr old at home. Encouraged mom to try her best, and to contact lactation with any questions or concerns.

## 2023-11-29 ENCOUNTER — LACTATION ENCOUNTER (OUTPATIENT)
Age: 38
End: 2023-11-29

## 2023-11-29 NOTE — LACTATION NOTE
This note was copied from a baby's chart.  Lactation Consultant Note  Lactation Consultation       Maternal Information       Maternal Assessment       Infant Assessment       Feeding Assessment       LATCH TOOL       Breast Pump       Other OB Lactation Tools       Patient Follow-up       Other OB Lactation Documentation       Recommendations/Summary  Mom states no problem or concerns. She is still working with her insurance for her pump for home use. Mom currently has a easton pump rental. Is aware she can still rent pump for a monthly cost. Mom also has [employer] and encouraged to talk with them about pump rentals.

## 2023-12-01 ENCOUNTER — LACTATION ENCOUNTER (OUTPATIENT)
Age: 38
End: 2023-12-01

## 2023-12-01 NOTE — LACTATION NOTE
This note was copied from a baby's chart.  Lactation Consultant Note  Lactation Consultation       Maternal Information       Maternal Assessment       Infant Assessment       Feeding Assessment       LATCH TOOL       Breast Pump       Other OB Lactation Tools       Patient Follow-up       Other OB Lactation Documentation       Recommendations/Summary  Provided mom with the names of companies that provide breasts pumps with insurance. One company was found to take moms insurance. Mom working on getting her pump for home use.

## 2023-12-04 ENCOUNTER — LACTATION ENCOUNTER (OUTPATIENT)
Age: 38
End: 2023-12-04

## 2023-12-04 NOTE — LACTATION NOTE
This note was copied from a baby's chart.  Lactation Consultant Note    Recommendations/Summary  Notified of [facility] Lactation Support group meeting and provided written invitation. Mom reports that pumping continues to go well and the baby is able to nurse from the breast when mom visits.  Mom has no questions at present .  She was invited to follow up with LC services as needed.

## 2023-12-07 ENCOUNTER — APPOINTMENT (OUTPATIENT)
Dept: MATERNAL FETAL MEDICINE | Facility: HOSPITAL | Age: 38
End: 2023-12-07
Payer: OTHER GOVERNMENT

## 2023-12-08 ENCOUNTER — ROUTINE PRENATAL (OUTPATIENT)
Dept: MATERNAL FETAL MEDICINE | Facility: HOSPITAL | Age: 38
End: 2023-12-08
Payer: OTHER GOVERNMENT

## 2023-12-08 VITALS — WEIGHT: 182 LBS | SYSTOLIC BLOOD PRESSURE: 103 MMHG | BODY MASS INDEX: 33.29 KG/M2 | DIASTOLIC BLOOD PRESSURE: 67 MMHG

## 2023-12-08 DIAGNOSIS — O11.9 CHRONIC HYPERTENSION WITH SUPERIMPOSED PREECLAMPSIA (HHS-HCC): ICD-10-CM

## 2023-12-08 DIAGNOSIS — O24.414 INSULIN CONTROLLED GESTATIONAL DIABETES MELLITUS (GDM) DURING PREGNANCY, ANTEPARTUM (HHS-HCC): ICD-10-CM

## 2023-12-08 PROCEDURE — G0463 HOSPITAL OUTPT CLINIC VISIT: HCPCS | Performed by: OBSTETRICS & GYNECOLOGY

## 2023-12-08 PROCEDURE — 99213 OFFICE O/P EST LOW 20 MIN: CPT | Performed by: OBSTETRICS & GYNECOLOGY

## 2023-12-08 ASSESSMENT — EDINBURGH POSTNATAL DEPRESSION SCALE (EPDS)
I HAVE BLAMED MYSELF UNNECESSARILY WHEN THINGS WENT WRONG: YES, SOME OF THE TIME
I HAVE BEEN SO UNHAPPY THAT I HAVE BEEN CRYING: NO, NEVER
I HAVE BEEN ANXIOUS OR WORRIED FOR NO GOOD REASON: YES, SOMETIMES
I HAVE FELT SCARED OR PANICKY FOR NO GOOD REASON: YES, SOMETIMES
I HAVE FELT SAD OR MISERABLE: NOT VERY OFTEN
THE THOUGHT OF HARMING MYSELF HAS OCCURRED TO ME: NEVER
I HAVE LOOKED FORWARD WITH ENJOYMENT TO THINGS: AS MUCH AS I EVER DID
TOTAL SCORE: 9
I HAVE BEEN SO UNHAPPY THAT I HAVE HAD DIFFICULTY SLEEPING: NOT VERY OFTEN
I HAVE BEEN ABLE TO LAUGH AND SEE THE FUNNY SIDE OF THINGS: AS MUCH AS I ALWAYS COULD
THINGS HAVE BEEN GETTING ON TOP OF ME: NO, MOST OF THE TIME I HAVE COPED QUITE WELL

## 2023-12-08 NOTE — PROGRESS NOTES
Subjective   Gabrielle David is a 38 y.o. female who presents for a postpartum visit. She is 5 weeks postpartum following a  repeat CD for early severe SiPEC . I have fully reviewed the prenatal and intrapartum course. The delivery was at 31+ gestational weeks. Anesthesia: spinal. Postpartum course has been uncomplicated, she remains on labetalol 400mg BID and nifedipine 60mg BID for BP control. Baby's course has been relatively uncomplicated, still in NICU. Bleeding  had stopped but now she feels her period is returning . Bowel function is normal. Bladder function is normal. Patient is not sexually active. Contraception method is none, spouse plans a vasectomy. Postpartum depression screening: negative.    Review of Systems     Objective   /67   Wt 82.6 kg (182 lb)   LMP 03/30/2023   BMI 33.29 kg/m²    Gen- NAD  Abd- soft, incision well healed  Ext- symmetrical, nontender  Pelvic- deferred     Assessment/Plan   Normal postpartum exam. Pap smear not done at today's visit. Due in 2025.     1. Contraception: vasectomy planned  2. Stop labetalol for BP's and continue nifedipine 60mg BID, follow up with PCP, will give recommendations  3. Reviewed need for life long screening for T2DM.   4. Reviewed scar massage for shoot pains at the right side of the incision.   5. Follow up as needed.    Eunice Lamb MD  Maternal Fetal Medicine  Director of Fetal Intervention

## (undated) DEVICE — SUTURE, VICRYL, 0, 36 IN, CT, UNDYED

## (undated) DEVICE — SUTURE, MONOCRYL, 4-0, 27 IN, PS-2, UNDYED